# Patient Record
Sex: MALE | Race: WHITE | Employment: OTHER | URBAN - NONMETROPOLITAN AREA
[De-identification: names, ages, dates, MRNs, and addresses within clinical notes are randomized per-mention and may not be internally consistent; named-entity substitution may affect disease eponyms.]

---

## 2020-07-17 ENCOUNTER — APPOINTMENT (OUTPATIENT)
Dept: GENERAL RADIOLOGY | Age: 55
DRG: 240 | End: 2020-07-17
Payer: MEDICARE

## 2020-07-17 ENCOUNTER — HOSPITAL ENCOUNTER (INPATIENT)
Age: 55
LOS: 6 days | Discharge: NURSING, REHAB OR LONG-TERM CARE | DRG: 240 | End: 2020-07-23
Attending: EMERGENCY MEDICINE | Admitting: HOSPITALIST
Payer: MEDICARE

## 2020-07-17 PROBLEM — F31.9 BIPOLAR DISORDER (HCC): Status: ACTIVE | Noted: 2020-07-17

## 2020-07-17 PROBLEM — I73.9 PVD (PERIPHERAL VASCULAR DISEASE) (HCC): Status: ACTIVE | Noted: 2020-07-17

## 2020-07-17 PROBLEM — L97.529 SKIN ULCERS OF FOOT, BILATERAL (HCC): Status: ACTIVE | Noted: 2020-07-17

## 2020-07-17 PROBLEM — F33.9 MAJOR DEPRESSION, RECURRENT, CHRONIC (HCC): Status: ACTIVE | Noted: 2020-07-17

## 2020-07-17 PROBLEM — M86.172 OSTEOMYELITIS OF ANKLE OR FOOT, ACUTE, LEFT (HCC): Status: ACTIVE | Noted: 2020-07-17

## 2020-07-17 PROBLEM — D72.829 LEUKOCYTOSIS: Status: ACTIVE | Noted: 2020-07-17

## 2020-07-17 PROBLEM — Z71.6 TOBACCO ABUSE COUNSELING: Status: ACTIVE | Noted: 2020-07-17

## 2020-07-17 PROBLEM — F99 PSYCHIATRIC DISORDER: Status: ACTIVE | Noted: 2020-07-17

## 2020-07-17 PROBLEM — F17.219 CIGARETTE NICOTINE DEPENDENCE WITH NICOTINE-INDUCED DISORDER: Status: ACTIVE | Noted: 2020-07-17

## 2020-07-17 PROBLEM — L97.519 SKIN ULCERS OF FOOT, BILATERAL (HCC): Status: ACTIVE | Noted: 2020-07-17

## 2020-07-17 LAB
ALBUMIN SERPL-MCNC: 3.4 G/DL (ref 3.5–5.2)
ALP BLD-CCNC: 127 U/L (ref 40–130)
ALT SERPL-CCNC: 14 U/L (ref 5–41)
ANION GAP SERPL CALCULATED.3IONS-SCNC: 12 MMOL/L (ref 7–19)
AST SERPL-CCNC: 12 U/L (ref 5–40)
BASOPHILS ABSOLUTE: 0.1 K/UL (ref 0–0.2)
BASOPHILS RELATIVE PERCENT: 0.8 % (ref 0–1)
BILIRUB SERPL-MCNC: 0.3 MG/DL (ref 0.2–1.2)
BILIRUBIN URINE: ABNORMAL
BLOOD, URINE: NEGATIVE
BUN BLDV-MCNC: 10 MG/DL (ref 6–20)
CALCIUM SERPL-MCNC: 9.7 MG/DL (ref 8.6–10)
CHLORIDE BLD-SCNC: 92 MMOL/L (ref 98–111)
CLARITY: ABNORMAL
CO2: 28 MMOL/L (ref 22–29)
COLOR: ABNORMAL
CREAT SERPL-MCNC: 0.7 MG/DL (ref 0.5–1.2)
EOSINOPHILS ABSOLUTE: 0.3 K/UL (ref 0–0.6)
EOSINOPHILS RELATIVE PERCENT: 2.6 % (ref 0–5)
GFR AFRICAN AMERICAN: >59
GFR NON-AFRICAN AMERICAN: >60
GLUCOSE BLD-MCNC: 263 MG/DL (ref 70–99)
GLUCOSE BLD-MCNC: 269 MG/DL (ref 74–109)
GLUCOSE URINE: >=1000 MG/DL
HCT VFR BLD CALC: 38.5 % (ref 42–52)
HEMOGLOBIN: 13.4 G/DL (ref 14–18)
IMMATURE GRANULOCYTES #: 0.1 K/UL
KETONES, URINE: NEGATIVE MG/DL
LACTIC ACID: 1.2 MMOL/L (ref 0.5–1.9)
LEUKOCYTE ESTERASE, URINE: NEGATIVE
LYMPHOCYTES ABSOLUTE: 3.4 K/UL (ref 1.1–4.5)
LYMPHOCYTES RELATIVE PERCENT: 28.8 % (ref 20–40)
MCH RBC QN AUTO: 31 PG (ref 27–31)
MCHC RBC AUTO-ENTMCNC: 34.8 G/DL (ref 33–37)
MCV RBC AUTO: 89.1 FL (ref 80–94)
MONOCYTES ABSOLUTE: 0.7 K/UL (ref 0–0.9)
MONOCYTES RELATIVE PERCENT: 6 % (ref 0–10)
NEUTROPHILS ABSOLUTE: 7.2 K/UL (ref 1.5–7.5)
NEUTROPHILS RELATIVE PERCENT: 61.3 % (ref 50–65)
NITRITE, URINE: NEGATIVE
PDW BLD-RTO: 12.3 % (ref 11.5–14.5)
PERFORMED ON: ABNORMAL
PH UA: 6 (ref 5–8)
PLATELET # BLD: 406 K/UL (ref 130–400)
PMV BLD AUTO: 8.5 FL (ref 9.4–12.4)
POTASSIUM REFLEX MAGNESIUM: 4.5 MMOL/L (ref 3.5–5)
PROTEIN UA: ABNORMAL MG/DL
RBC # BLD: 4.32 M/UL (ref 4.7–6.1)
SODIUM BLD-SCNC: 132 MMOL/L (ref 136–145)
SPECIFIC GRAVITY UA: 1.03 (ref 1–1.03)
TOTAL PROTEIN: 9.2 G/DL (ref 6.6–8.7)
UROBILINOGEN, URINE: 1 E.U./DL
WBC # BLD: 11.8 K/UL (ref 4.8–10.8)

## 2020-07-17 PROCEDURE — 2580000003 HC RX 258: Performed by: HOSPITALIST

## 2020-07-17 PROCEDURE — 73630 X-RAY EXAM OF FOOT: CPT

## 2020-07-17 PROCEDURE — 85025 COMPLETE CBC W/AUTO DIFF WBC: CPT

## 2020-07-17 PROCEDURE — 6360000002 HC RX W HCPCS: Performed by: EMERGENCY MEDICINE

## 2020-07-17 PROCEDURE — 87040 BLOOD CULTURE FOR BACTERIA: CPT

## 2020-07-17 PROCEDURE — 80053 COMPREHEN METABOLIC PANEL: CPT

## 2020-07-17 PROCEDURE — 99284 EMERGENCY DEPT VISIT MOD MDM: CPT

## 2020-07-17 PROCEDURE — 83605 ASSAY OF LACTIC ACID: CPT

## 2020-07-17 PROCEDURE — 2580000003 HC RX 258: Performed by: EMERGENCY MEDICINE

## 2020-07-17 PROCEDURE — 82947 ASSAY GLUCOSE BLOOD QUANT: CPT

## 2020-07-17 PROCEDURE — 1210000000 HC MED SURG R&B

## 2020-07-17 PROCEDURE — 81003 URINALYSIS AUTO W/O SCOPE: CPT

## 2020-07-17 PROCEDURE — 36415 COLL VENOUS BLD VENIPUNCTURE: CPT

## 2020-07-17 PROCEDURE — 6370000000 HC RX 637 (ALT 250 FOR IP): Performed by: HOSPITALIST

## 2020-07-17 RX ORDER — POLYETHYLENE GLYCOL 3350 17 G/17G
17 POWDER, FOR SOLUTION ORAL DAILY PRN
Status: DISCONTINUED | OUTPATIENT
Start: 2020-07-17 | End: 2020-07-23 | Stop reason: HOSPADM

## 2020-07-17 RX ORDER — OLANZAPINE 10 MG/1
10 TABLET ORAL NIGHTLY
Status: ON HOLD | COMMUNITY
End: 2020-07-20 | Stop reason: DRUGHIGH

## 2020-07-17 RX ORDER — HYDROXYZINE HYDROCHLORIDE 25 MG/1
25 TABLET, FILM COATED ORAL EVERY 6 HOURS PRN
COMMUNITY

## 2020-07-17 RX ORDER — INSULIN GLARGINE 100 [IU]/ML
100 INJECTION, SOLUTION SUBCUTANEOUS NIGHTLY
COMMUNITY

## 2020-07-17 RX ORDER — DEXTROSE MONOHYDRATE 50 MG/ML
100 INJECTION, SOLUTION INTRAVENOUS PRN
Status: DISCONTINUED | OUTPATIENT
Start: 2020-07-17 | End: 2020-07-23 | Stop reason: HOSPADM

## 2020-07-17 RX ORDER — NICOTINE 21 MG/24HR
1 PATCH, TRANSDERMAL 24 HOURS TRANSDERMAL DAILY
Status: DISCONTINUED | OUTPATIENT
Start: 2020-07-18 | End: 2020-07-23 | Stop reason: HOSPADM

## 2020-07-17 RX ORDER — HYDROXYZINE HYDROCHLORIDE 10 MG/1
25 TABLET, FILM COATED ORAL 3 TIMES DAILY PRN
Status: DISCONTINUED | OUTPATIENT
Start: 2020-07-17 | End: 2020-07-23 | Stop reason: HOSPADM

## 2020-07-17 RX ORDER — RISPERIDONE 4 MG/1
4 TABLET, FILM COATED ORAL NIGHTLY
COMMUNITY

## 2020-07-17 RX ORDER — TRAZODONE HYDROCHLORIDE 50 MG/1
50 TABLET ORAL NIGHTLY
Status: DISCONTINUED | OUTPATIENT
Start: 2020-07-17 | End: 2020-07-23 | Stop reason: HOSPADM

## 2020-07-17 RX ORDER — ATORVASTATIN CALCIUM 20 MG/1
10 TABLET, FILM COATED ORAL DAILY
Status: DISCONTINUED | OUTPATIENT
Start: 2020-07-17 | End: 2020-07-23 | Stop reason: HOSPADM

## 2020-07-17 RX ORDER — SODIUM CHLORIDE 0.9 % (FLUSH) 0.9 %
10 SYRINGE (ML) INJECTION EVERY 12 HOURS SCHEDULED
Status: DISCONTINUED | OUTPATIENT
Start: 2020-07-17 | End: 2020-07-23 | Stop reason: HOSPADM

## 2020-07-17 RX ORDER — ACETAMINOPHEN 325 MG/1
650 TABLET ORAL EVERY 6 HOURS PRN
Status: DISCONTINUED | OUTPATIENT
Start: 2020-07-17 | End: 2020-07-23 | Stop reason: HOSPADM

## 2020-07-17 RX ORDER — ONDANSETRON 2 MG/ML
4 INJECTION INTRAMUSCULAR; INTRAVENOUS EVERY 6 HOURS PRN
Status: DISCONTINUED | OUTPATIENT
Start: 2020-07-17 | End: 2020-07-23 | Stop reason: HOSPADM

## 2020-07-17 RX ORDER — OLANZAPINE 10 MG/1
10 TABLET ORAL NIGHTLY
Status: DISCONTINUED | OUTPATIENT
Start: 2020-07-17 | End: 2020-07-23 | Stop reason: HOSPADM

## 2020-07-17 RX ORDER — PROMETHAZINE HYDROCHLORIDE 12.5 MG/1
12.5 TABLET ORAL EVERY 6 HOURS PRN
Status: DISCONTINUED | OUTPATIENT
Start: 2020-07-17 | End: 2020-07-23 | Stop reason: HOSPADM

## 2020-07-17 RX ORDER — SIMVASTATIN 20 MG
20 TABLET ORAL NIGHTLY
COMMUNITY

## 2020-07-17 RX ORDER — MAGNESIUM SULFATE 1 G/100ML
1 INJECTION INTRAVENOUS PRN
Status: DISCONTINUED | OUTPATIENT
Start: 2020-07-17 | End: 2020-07-23 | Stop reason: HOSPADM

## 2020-07-17 RX ORDER — RISPERIDONE 1 MG/1
4 TABLET, FILM COATED ORAL NIGHTLY
Status: DISCONTINUED | OUTPATIENT
Start: 2020-07-17 | End: 2020-07-23 | Stop reason: HOSPADM

## 2020-07-17 RX ORDER — SODIUM CHLORIDE 0.9 % (FLUSH) 0.9 %
10 SYRINGE (ML) INJECTION PRN
Status: DISCONTINUED | OUTPATIENT
Start: 2020-07-17 | End: 2020-07-23 | Stop reason: HOSPADM

## 2020-07-17 RX ORDER — FAMOTIDINE 20 MG/1
20 TABLET, FILM COATED ORAL 2 TIMES DAILY
Status: DISCONTINUED | OUTPATIENT
Start: 2020-07-17 | End: 2020-07-23 | Stop reason: HOSPADM

## 2020-07-17 RX ORDER — DEXTROSE MONOHYDRATE 25 G/50ML
12.5 INJECTION, SOLUTION INTRAVENOUS PRN
Status: DISCONTINUED | OUTPATIENT
Start: 2020-07-17 | End: 2020-07-23 | Stop reason: HOSPADM

## 2020-07-17 RX ORDER — FLUOXETINE HYDROCHLORIDE 20 MG/1
40 CAPSULE ORAL DAILY
Status: DISCONTINUED | OUTPATIENT
Start: 2020-07-17 | End: 2020-07-23 | Stop reason: HOSPADM

## 2020-07-17 RX ORDER — SODIUM CHLORIDE 9 MG/ML
INJECTION, SOLUTION INTRAVENOUS ONCE
Status: COMPLETED | OUTPATIENT
Start: 2020-07-17 | End: 2020-07-17

## 2020-07-17 RX ORDER — ACETAMINOPHEN 650 MG/1
650 SUPPOSITORY RECTAL EVERY 6 HOURS PRN
Status: DISCONTINUED | OUTPATIENT
Start: 2020-07-17 | End: 2020-07-23 | Stop reason: HOSPADM

## 2020-07-17 RX ORDER — POTASSIUM CHLORIDE 7.45 MG/ML
10 INJECTION INTRAVENOUS PRN
Status: DISCONTINUED | OUTPATIENT
Start: 2020-07-17 | End: 2020-07-23 | Stop reason: HOSPADM

## 2020-07-17 RX ORDER — TRAZODONE HYDROCHLORIDE 50 MG/1
50 TABLET ORAL NIGHTLY
COMMUNITY
End: 2020-10-21

## 2020-07-17 RX ORDER — POTASSIUM CHLORIDE 20 MEQ/1
40 TABLET, EXTENDED RELEASE ORAL PRN
Status: DISCONTINUED | OUTPATIENT
Start: 2020-07-17 | End: 2020-07-23 | Stop reason: HOSPADM

## 2020-07-17 RX ORDER — NICOTINE POLACRILEX 4 MG
15 LOZENGE BUCCAL PRN
Status: DISCONTINUED | OUTPATIENT
Start: 2020-07-17 | End: 2020-07-23 | Stop reason: HOSPADM

## 2020-07-17 RX ORDER — FLUOXETINE 10 MG/1
40 CAPSULE ORAL DAILY
Status: ON HOLD | COMMUNITY
End: 2020-07-20

## 2020-07-17 RX ORDER — INSULIN GLARGINE 100 [IU]/ML
15 INJECTION, SOLUTION SUBCUTANEOUS NIGHTLY
Status: DISCONTINUED | OUTPATIENT
Start: 2020-07-17 | End: 2020-07-18

## 2020-07-17 RX ADMIN — CEFEPIME 2 G: 2 INJECTION, POWDER, FOR SOLUTION INTRAMUSCULAR; INTRAVENOUS at 17:27

## 2020-07-17 RX ADMIN — Medication 1000 MG: at 17:07

## 2020-07-17 RX ADMIN — SODIUM CHLORIDE: 9 INJECTION, SOLUTION INTRAVENOUS at 18:13

## 2020-07-17 RX ADMIN — OLANZAPINE 10 MG: 10 TABLET, FILM COATED ORAL at 23:15

## 2020-07-17 RX ADMIN — SODIUM CHLORIDE, PRESERVATIVE FREE 10 ML: 5 INJECTION INTRAVENOUS at 23:19

## 2020-07-17 RX ADMIN — FAMOTIDINE 20 MG: 20 TABLET ORAL at 23:15

## 2020-07-17 RX ADMIN — TRAZODONE HYDROCHLORIDE 50 MG: 50 TABLET ORAL at 23:15

## 2020-07-17 RX ADMIN — INSULIN GLARGINE 15 UNITS: 100 INJECTION, SOLUTION SUBCUTANEOUS at 23:16

## 2020-07-17 RX ADMIN — RISPERIDONE 4 MG: 1 TABLET ORAL at 23:15

## 2020-07-17 RX ADMIN — INSULIN LISPRO 2 UNITS: 100 INJECTION, SOLUTION INTRAVENOUS; SUBCUTANEOUS at 23:16

## 2020-07-17 ASSESSMENT — ENCOUNTER SYMPTOMS
SINUS PRESSURE: 0
APNEA: 0
FACIAL SWELLING: 0
VOICE CHANGE: 0
BLOOD IN STOOL: 0
SHORTNESS OF BREATH: 0
CHOKING: 0
CONSTIPATION: 0
NAUSEA: 0
DIARRHEA: 0
EYE DISCHARGE: 0
SORE THROAT: 0

## 2020-07-17 NOTE — H&P
[place], [place], [address]        DEPARTMENT OF HOSPITALIST MEDICINE        HISTORY & PHYSICAL:          REASON FOR ADMISSION:  Chief Complaint   Patient presents with    Wound Infection     arrived via EMS from [address] with quan foot ulcers        HISTORY OF PRESENT ILLNESS:  Sonny Clement is an 50 y.o. male. He is a very unfortunate male with chronic smoking, PVD and chronic medical issues who is a nursing home resident. He was sent to the ER for evaluation due to bilateral foot ulcers for the last 5 months which are now getting worse. Work-up was done in the ER including x-ray of the left foot which showed findings consistent with osteomyelitis. He also has bilateral PVD with decreased palpations. He was started on IV cefepime and vancomycin and is being admitted for medical management, wound care, vascular surgery and ID evaluation. PAST MEDICAL HISTORY:  Past Medical History:   Diagnosis Date    Anxiety     Diabetes ([facility] [address]     [facility]          PAST SURGICAL HISTORY:  History reviewed. No pertinent surgical history.      SOCIAL HISTORY:  Social History     Socioeconomic History    Marital status: Single     Spouse name: None    Number of children: None    Years of education: None    Highest education level: None   Occupational History    None   Social Needs    Financial resource strain: None    Food insecurity     Worry: None     Inability: None    Transportation needs     Medical: None     Non-medical: None   Tobacco Use    Smoking status: Never Smoker    Smokeless tobacco: Never Used   Substance and Sexual Activity    Alcohol use: Not Currently    Drug use: Never    Sexual activity: None   Lifestyle    Physical activity     Days per week: None     Minutes per session: None    Stress: None   Relationships    Social connections     Talks on phone: None     Gets together: None     Attends Sabianist service: None     Active member of club or organization: None     Attends meetings of clubs or organizations: None     Relationship status: None    Intimate partner violence     Fear of current or ex partner: None     Emotionally abused: None     Physically abused: None     Forced sexual activity: None   Other Topics Concern    None   Social History Narrative    None        FAMILY HISTORY:  History reviewed. No pertinent family history.       ALLERGIES:  Allergies   Allergen Reactions    Clozaril [Clozapine]     Haldol [Haloperidol]     Lithium     Navane [Thiothixene]         PRIOR TO ADMISSION MEDS:  Medications Prior to Admission: insulin glargine (BASAGLAR KWIKPEN) 100 UNIT/ML injection pen, Inject 15 Units into the skin nightly  FLUoxetine (PROZAC) 10 MG capsule, Take 40 mg by mouth daily  hydrOXYzine (ATARAX) 25 MG tablet, Take 25 mg by mouth 3 times daily as needed for Itching  OLANZapine (ZYPREXA) 10 MG tablet, Take 10 mg by mouth nightly  risperiDONE (RISPERDAL) 4 MG tablet, Take 4 mg by mouth nightly  simvastatin (ZOCOR) 20 MG tablet, Take 20 mg by mouth nightly  traZODone (DESYREL) 50 MG tablet, Take 50 mg by mouth nightly     REVIEW OF SYSTEMS:  Constitutional:  No fevers, chills, nausea, vomiting, + tiredness and fatigue   Head:  No head injury, facial trauma   Eyes:  No acute visual changes, exudate, trauma   Ears:  No acute hearing loss, earaches   Nose: No nasal discharge, epistaxis   Neck: No new hoarseness, voice change, or new masses   Lungs:   No hemoptysis, pleurisy   Heart:  No chest pressure with exertion, palpitations,    Abdomen:   No new masses, no bright red blood per rectum   Extremities: + acute pain/difficulty while ambulating due to bilateral foot ulcers, left more than right   Skin: + Bilateral foot ulcerations, large ulceration on the plantar aspect of left foot   Neurologic: No new motor or sensory changes       PHYSICAL EXAM:  /83   Pulse 79   Temp 98 °F (36.7 °C) (Temporal)   Resp 18   Ht 6' 3\" (1.905 m) hours.  ABG:No results for input(s): PHART, WNW1WWB, PO2ART, CBW3SFV, BEART, HGBAE, Q2FCFQQV, CARBOXHGBART in the last 72 hours. EKG:   Please see chart      IMAGING:      Xr Foot Left (min 3 Views)    Result Date: 7/17/2020  1. Findings are highly suggestive of acute osteomyelitis in the 4th and 5th metatarsal heads. Involvement of the base of the 4th proximal phalanx is also suspected. 2. Chronic osteomyelitis of the 1st metatarsal head and great toe and the 3rd metatarsal head. 3. Soft tissue air in the plantar aspect of the 4th MTP joint. Signed by Dr Tess Lloyd on 7/17/2020 2:20 PM    Xr Foot Right (min 3 Views)    Result Date: 7/17/2020  1. No evidence of acute bony abnormality in the right foot. While there is no radiographic evidence of osteomyelitis, the findings of acute osteomyelitis do not appear on radiographs for 10 to 14 days. MRI would be more sensitive and specific for diagnosis of acute osteomyelitis. If the patient is not a candidate for MRI, nuclear medicine three-phase bone scan would be recommended. Signed by Dr Tess Lloyd on 7/17/2020 2:22 PM        Assessment and Plan:    Principal Problem:    Osteomyelitis of ankle or foot, acute, left (HCC)  Active Problems:    Major depression, recurrent, chronic (HCC)    Bipolar disorder ([address]    Psychiatric disorder    Leukocytosis    Skin ulcers of foot, bilateral (HCC)    PVD (peripheral vascular disease) ([address]    Tobacco abuse counseling    Cigarette nicotine dependence with nicotine-induced disorder  Resolved Problems:    * No resolved hospital problems.  *       Admit patient to medical unit under full inpatient status  Patient started on IV vancomycin and cefepime in the ER which we will continue on the floor  Pharmacy consult for vancomycin management  Patient started on diabetic diet  Hemoglobin A1c level ordered  Continue diabetic meds  Accu-Cheks qAC and qHS ordered with low-dose insulin coverage as per protocol  Started pt on IV hydration  ID consult given for evaluation and further treatment recommendations  Vascular surgery consult given for evaluation and further treatment recommendations  Patient might need I&D or possible amputation of the left foot    Repeat labs in a.m. Electrolyte replacement as per protocol. Patient will be monitored very closely on the floor. Further recommendations as per the hospital course. Patient  is on DVT prophylaxis  Current medications reviewed  Lab work reviewed  Radiology/Chest x-ray films reviewed  Discussed with the nurse and addressed all questions/concerns  Discussed with Patient and/or Family at the bedside in detail . .. they verbalize understanding and agree with the management plan. Attestation:  A minimum of two midnights of inpatient hospital care is anticipated to be required based on the patient's clinical condition which requires intensive medical therapy. At this time the patient is not clinically optimized for safe discharge. Kaushik Celis MD  9:22 PM 7/17/2020      DISCLAIMER: This note was created with electronic voice recognition which does have occasional errors. If you have any questions regarding the content within the note please do not hesitate to contact me. .. Thanks.

## 2020-07-17 NOTE — PROGRESS NOTES
Maisha Ahuja arrived to room # 321. Presented with: osteo bilateral feet  Mental Status: Patient is oriented, alert and coherent. Vitals:    07/17/20 1730   BP: 139/83   Pulse: 79   Resp: 18   Temp: 98 °F (36.7 °C)   SpO2: 92%     Patient safety contract and falls prevention contract reviewed with patient Yes. Oriented Patient to room. Call light within reach. Yes.   Needs, issues or concerns expressed at this time: no.      Electronically signed by Italia Rg RN on 7/17/2020 at 5:42 PM

## 2020-07-17 NOTE — PROGRESS NOTES
4 Eyes Skin Assessment    Carlos Marcial is being assessed upon: Admission    I agree that I, Vida Gupta, along with Adrian Crouch (either 2 RN's or 1 LPN and 1 RN) have performed a thorough Head to Toe Skin Assessment on the patient. ALL assessment sites listed below have been assessed. Areas assessed by both nurses:     [x]   Head, Face, and Ears   [x]   Shoulders, Back, and Chest  [x]   Arms, Elbows, and Hands   [x]   Coccyx, Sacrum, and Ischium  [x]   Legs, Feet, and Heels    Does the Patient have Skin Breakdown? Yes, wound(s) noted upon assessment. It is the responsibility of the Primary Nurse to assure that the following documentation, preventions, orders, and consults are complete on the above noted wound(s): Wound LDA initiated. LDA Flowsheet Documentation includes the Corine-wound, Wound Assessment, Measurements, Dressing Treatment, Drainage, and Color.     Jarocho Prevention initiated: Yes  Wound Care Orders initiated: Yes    [address]  nurse consulted for Pressure Injury (Stage 3,4, Unstageable, DTI, NWPT, and Complex wounds) and New or Established Ostomies: Yes        Primary Nurse eSignature: Vida Gupta RN on 7/17/2020 at 5:42 PM      Co-Signer eSignature: Electronically signed by Jerry Cain RN on 7/17/20 at 5:45 PM CDT

## 2020-07-17 NOTE — ED PROVIDER NOTES
[facility] EMERGENCY DEPT  eMERGENCY dEPARTMENT eNCOUnter      Pt Name: Molly Montejo  MRN: [id number]  [place] 1965  Date of evaluation: 7/17/2020  Provider: Jolene Naqvi MD    CHIEF COMPLAINT       Chief Complaint   Patient presents with    Wound Infection     arrived via EMS from [facility] with quan foot ulcers         HISTORY OF PRESENT ILLNESS   (Location/Symptom, Timing/Onset,Context/Setting, Quality, Duration, Modifying Factors, Severity)  Note limiting factors. Molly Montejo is a 50 y.o. male who presents to the emergency department evaluation of wound to foot. 62-year-old male resident of [facility] sent in for deep ulceration of the left foot. He does not give me much other history. Far as I can tell he states he is not running fever or chills. States she sees a podiatrist in [place]. Chart indicates he suffers from schizophrenia anxiety and diabetes. The history is provided by the patient. NursingNotes were reviewed. REVIEW OF SYSTEMS    (2-9 systems for level 4, 10 or more for level 5)     Review of Systems   Constitutional: Negative for chills and fever. HENT: Negative for congestion, drooling, facial swelling, nosebleeds, sinus pressure, sore throat and voice change. Eyes: Negative for discharge. Respiratory: Negative for apnea, choking and shortness of breath. Cardiovascular: Negative for chest pain and leg swelling. Gastrointestinal: Negative for blood in stool, constipation, diarrhea and nausea. Genitourinary: Negative for dysuria and enuresis. Musculoskeletal: Negative for joint swelling. Skin: Positive for wound (Bilateral feet). Negative for rash. Neurological: Negative for seizures and syncope. Psychiatric/Behavioral: Negative for behavioral problems, hallucinations and suicidal ideas. All other systems reviewed and are negative. A complete review of systems was performed and is negative except as noted above in the HPI. PAST MEDICAL HISTORY     Past Medical History:   Diagnosis Date    Anxiety     Diabetes ([facility] [address]     [facility]          SURGICAL HISTORY     History reviewed. No pertinent surgical history. CURRENT MEDICATIONS       Previous Medications    FLUOXETINE (PROZAC) 10 MG CAPSULE    Take 40 mg by mouth daily    HYDROXYZINE (ATARAX) 25 MG TABLET    Take 25 mg by mouth 3 times daily as needed for Itching    INSULIN GLARGINE (BASAGLAR KWIKPEN) 100 UNIT/ML INJECTION PEN    Inject 15 Units into the skin nightly    OLANZAPINE (ZYPREXA) 10 MG TABLET    Take 10 mg by mouth nightly    RISPERIDONE (RISPERDAL) 4 MG TABLET    Take 4 mg by mouth nightly    SIMVASTATIN (ZOCOR) 20 MG TABLET    Take 20 mg by mouth nightly    TRAZODONE (DESYREL) 50 MG TABLET    Take 50 mg by mouth nightly       ALLERGIES     Clozaril [clozapine]; Haldol [haloperidol]; Lithium; and Navane [thiothixene]    FAMILY HISTORY     History reviewed. No pertinent family history.        SOCIAL HISTORY       Social History     Socioeconomic History    Marital status: Single     Spouse name: None    Number of children: None    Years of education: None    Highest education level: None   Occupational History    None   Social Needs    Financial resource strain: None    Food insecurity     Worry: None     Inability: None    Transportation needs     Medical: None     Non-medical: None   Tobacco Use    Smoking status: Never Smoker    Smokeless tobacco: Never Used   Substance and Sexual Activity    Alcohol use: Not Currently    Drug use: Never    Sexual activity: None   Lifestyle    Physical activity     Days per week: None     Minutes per session: None    Stress: None   Relationships    Social connections     Talks on phone: None     Gets together: None     Attends [employer] service: None     Active member of club or organization: None     Attends meetings of clubs or organizations: None     Relationship status: None    Intimate partner violence     Fear of current or ex partner: None     Emotionally abused: None     Physically abused: None     Forced sexual activity: None   Other Topics Concern    None   Social History Narrative    None       SCREENINGS             PHYSICAL EXAM    (up to 7 for level 4, 8 or more for level 5)     ED Triage Vitals [07/17/20 1340]   BP Temp Temp src Pulse Resp SpO2 Height Weight   98/72 98.8 °F (37.1 °C) -- 88 14 96 % -- --       Physical Exam  Vitals signs and nursing note reviewed. Constitutional:       General: He is not in acute distress. Appearance: He is well-developed. HENT:      Head: Normocephalic and atraumatic. Right Ear: External ear normal.      Left Ear: External ear normal.      Nose: Nose normal.   Eyes:      Pupils: Pupils are equal, round, and reactive to light. Neck:      Musculoskeletal: Normal range of motion and neck supple. Cardiovascular:      Rate and Rhythm: Normal rate and regular rhythm. Heart sounds: Normal heart sounds. Pulmonary:      Effort: Pulmonary effort is normal.      Breath sounds: Normal breath sounds. Abdominal:      General: Bowel sounds are normal.      Palpations: Abdomen is soft. Musculoskeletal: Normal range of motion. Feet:    Skin:     General: Skin is warm and dry. Neurological:      General: No focal deficit present. Mental Status: He is alert and oriented to person, place, and time. Psychiatric:         Behavior: Behavior normal.         DIAGNOSTIC RESULTS     EKG: All EKG's are interpreted by the Emergency Department Physician who either signs or Co-signs this chart in the absence of a cardiologist.        RADIOLOGY:   Non-plain film images such as CT, Ultrasound and MRI are read by the radiologist. Plainradiographic images are visualized and preliminarily interpreted by the emergency physician with the below findings:    I have reviewed the results.     Interpretation per the Radiologist below, if available at the time of this note:    XR FOOT LEFT (MIN 3 VIEWS)   Final Result   1. Findings are highly suggestive of acute osteomyelitis in the 4th   and 5th metatarsal heads. Involvement of the base of the 4th proximal   phalanx is also suspected. 2. Chronic osteomyelitis of the 1st metatarsal head and great toe and   the 3rd metatarsal head. 3. Soft tissue air in the plantar aspect of the 4th MTP joint. Signed by Dr Estela Edwards on 7/17/2020 2:20 PM      XR FOOT RIGHT (MIN 3 VIEWS)   Final Result   1. No evidence of acute bony abnormality in the right foot. While there is no radiographic evidence of osteomyelitis, the findings   of acute osteomyelitis do not appear on radiographs for 10 to 14 days. MRI would be more sensitive and specific for diagnosis of acute   osteomyelitis. If the patient is not a candidate for MRI, nuclear   medicine three-phase bone scan would be recommended. Signed by Dr Estela Edwards on 7/17/2020 2:22 PM            ED BEDSIDE ULTRASOUND:   Performed by ED Physician - none    LABS:  Labs Reviewed   CBC WITH AUTO DIFFERENTIAL - Abnormal; Notable for the following components:       Result Value    WBC 11.8 (*)     RBC 4.32 (*)     Hemoglobin 13.4 (*)     Hematocrit 38.5 (*)     Platelets 749 (*)     MPV 8.5 (*)     All other components within normal limits   COMPREHENSIVE METABOLIC PANEL W/ REFLEX TO MG FOR LOW K - Abnormal; Notable for the following components:    Sodium 132 (*)     Chloride 92 (*)     Glucose 269 (*)     Total Protein 9.2 (*)     Alb 3.4 (*)     All other components within normal limits   CULTURE, BLOOD 1   CULTURE, BLOOD 2   LACTIC ACID, PLASMA   URINE RT REFLEX TO CULTURE       All other labs were within normal range or not returned as of this dictation.     EMERGENCY DEPARTMENT COURSE and DIFFERENTIALDIAGNOSIS/MDM:   Vitals:    Vitals:    07/17/20 1340 07/17/20 1400 07/17/20 1600   BP: 98/72 103/68 108/62   Pulse: 88 84 80   Resp: 14 16 17   Temp: 98.8 °F (37.1 °C) SpO2: 96% 96% 97%       MDM      CONSULTS:  PHARMACY TO DOSE VANCOMYCIN  IP CONSULT TO HOSPITALIST    PROCEDURES:  Unless otherwise notedbelow, none     Procedures    FINAL IMPRESSION     1. Other acute osteomyelitis of left foot ([address]    2. Skin ulcer of left foot with necrosis of bone ([address]    3. Skin ulcer of right foot, limited to breakdown of skin ([address]    4.  Type 2 diabetes mellitus with other specified complication, unspecified whether long term insulin use ([address]          DISPOSITION/PLAN   DISPOSITION Decision To Admit 07/17/2020 04:56:52 PM      PATIENT REFERRED TO:  @FUP@    DISCHARGE MEDICATIONS:  New Prescriptions    No medications on file          (Please note that portions of this note were completed with a voice recognition program.  Efforts were made to edit the dictations butoccasionally words are mis-transcribed.)    Stanton Duarte MD (electronically signed)  AttendingEmergency Physician          Jacob Agarwal MD  07/17/20 4911

## 2020-07-18 PROBLEM — F20.9 SCHIZOPHRENIA (HCC): Status: ACTIVE | Noted: 2020-07-18

## 2020-07-18 LAB
ANION GAP SERPL CALCULATED.3IONS-SCNC: 10 MMOL/L (ref 7–19)
BASOPHILS ABSOLUTE: 0.1 K/UL (ref 0–0.2)
BASOPHILS RELATIVE PERCENT: 1 % (ref 0–1)
BUN BLDV-MCNC: 10 MG/DL (ref 6–20)
CALCIUM SERPL-MCNC: 9 MG/DL (ref 8.6–10)
CHLORIDE BLD-SCNC: 96 MMOL/L (ref 98–111)
CO2: 26 MMOL/L (ref 22–29)
CREAT SERPL-MCNC: 0.5 MG/DL (ref 0.5–1.2)
EOSINOPHILS ABSOLUTE: 0.3 K/UL (ref 0–0.6)
EOSINOPHILS RELATIVE PERCENT: 3.1 % (ref 0–5)
GFR AFRICAN AMERICAN: >59
GFR NON-AFRICAN AMERICAN: >60
GLUCOSE BLD-MCNC: 128 MG/DL (ref 70–99)
GLUCOSE BLD-MCNC: 210 MG/DL (ref 74–109)
GLUCOSE BLD-MCNC: 230 MG/DL (ref 70–99)
GLUCOSE BLD-MCNC: 254 MG/DL (ref 70–99)
GLUCOSE BLD-MCNC: 296 MG/DL (ref 70–99)
HBA1C MFR BLD: 11 % (ref 4–6)
HCT VFR BLD CALC: 33.3 % (ref 42–52)
HEMOGLOBIN: 11.2 G/DL (ref 14–18)
IMMATURE GRANULOCYTES #: 0.1 K/UL
LYMPHOCYTES ABSOLUTE: 4.3 K/UL (ref 1.1–4.5)
LYMPHOCYTES RELATIVE PERCENT: 40.8 % (ref 20–40)
MAGNESIUM: 1.8 MG/DL (ref 1.6–2.6)
MCH RBC QN AUTO: 30.2 PG (ref 27–31)
MCHC RBC AUTO-ENTMCNC: 33.6 G/DL (ref 33–37)
MCV RBC AUTO: 89.8 FL (ref 80–94)
MONOCYTES ABSOLUTE: 0.7 K/UL (ref 0–0.9)
MONOCYTES RELATIVE PERCENT: 6.9 % (ref 0–10)
NEUTROPHILS ABSOLUTE: 5 K/UL (ref 1.5–7.5)
NEUTROPHILS RELATIVE PERCENT: 47.6 % (ref 50–65)
PDW BLD-RTO: 12.3 % (ref 11.5–14.5)
PERFORMED ON: ABNORMAL
PHOSPHORUS: 3.3 MG/DL (ref 2.5–4.5)
PLATELET # BLD: 373 K/UL (ref 130–400)
PMV BLD AUTO: 8.8 FL (ref 9.4–12.4)
POTASSIUM SERPL-SCNC: 4.2 MMOL/L (ref 3.5–5)
RBC # BLD: 3.71 M/UL (ref 4.7–6.1)
SODIUM BLD-SCNC: 132 MMOL/L (ref 136–145)
WBC # BLD: 10.5 K/UL (ref 4.8–10.8)

## 2020-07-18 PROCEDURE — 82947 ASSAY GLUCOSE BLOOD QUANT: CPT

## 2020-07-18 PROCEDURE — 99221 1ST HOSP IP/OBS SF/LOW 40: CPT | Performed by: SURGERY

## 2020-07-18 PROCEDURE — 6360000002 HC RX W HCPCS: Performed by: HOSPITALIST

## 2020-07-18 PROCEDURE — 83735 ASSAY OF MAGNESIUM: CPT

## 2020-07-18 PROCEDURE — 1210000000 HC MED SURG R&B

## 2020-07-18 PROCEDURE — 36415 COLL VENOUS BLD VENIPUNCTURE: CPT

## 2020-07-18 PROCEDURE — 84100 ASSAY OF PHOSPHORUS: CPT

## 2020-07-18 PROCEDURE — 93922 UPR/L XTREMITY ART 2 LEVELS: CPT

## 2020-07-18 PROCEDURE — 2580000003 HC RX 258: Performed by: HOSPITALIST

## 2020-07-18 PROCEDURE — 93925 LOWER EXTREMITY STUDY: CPT

## 2020-07-18 PROCEDURE — 80048 BASIC METABOLIC PNL TOTAL CA: CPT

## 2020-07-18 PROCEDURE — 6360000002 HC RX W HCPCS: Performed by: EMERGENCY MEDICINE

## 2020-07-18 PROCEDURE — 85025 COMPLETE CBC W/AUTO DIFF WBC: CPT

## 2020-07-18 PROCEDURE — 83036 HEMOGLOBIN GLYCOSYLATED A1C: CPT

## 2020-07-18 PROCEDURE — 6370000000 HC RX 637 (ALT 250 FOR IP): Performed by: HOSPITALIST

## 2020-07-18 PROCEDURE — 2580000003 HC RX 258: Performed by: EMERGENCY MEDICINE

## 2020-07-18 RX ORDER — INSULIN GLARGINE 100 [IU]/ML
15 INJECTION, SOLUTION SUBCUTANEOUS 2 TIMES DAILY
Status: DISCONTINUED | OUTPATIENT
Start: 2020-07-18 | End: 2020-07-23 | Stop reason: HOSPADM

## 2020-07-18 RX ADMIN — INSULIN LISPRO 3 UNITS: 100 INJECTION, SOLUTION INTRAVENOUS; SUBCUTANEOUS at 17:42

## 2020-07-18 RX ADMIN — FAMOTIDINE 20 MG: 20 TABLET ORAL at 08:49

## 2020-07-18 RX ADMIN — RISPERIDONE 4 MG: 1 TABLET ORAL at 20:28

## 2020-07-18 RX ADMIN — FLUOXETINE HYDROCHLORIDE 40 MG: 20 CAPSULE ORAL at 08:49

## 2020-07-18 RX ADMIN — INSULIN LISPRO 2 UNITS: 100 INJECTION, SOLUTION INTRAVENOUS; SUBCUTANEOUS at 20:27

## 2020-07-18 RX ADMIN — ATORVASTATIN CALCIUM 10 MG: 20 TABLET, FILM COATED ORAL at 08:49

## 2020-07-18 RX ADMIN — VANCOMYCIN HYDROCHLORIDE 1500 MG: 10 INJECTION, POWDER, LYOPHILIZED, FOR SOLUTION INTRAVENOUS at 02:42

## 2020-07-18 RX ADMIN — OLANZAPINE 10 MG: 10 TABLET, FILM COATED ORAL at 20:32

## 2020-07-18 RX ADMIN — SODIUM CHLORIDE, PRESERVATIVE FREE 10 ML: 5 INJECTION INTRAVENOUS at 20:28

## 2020-07-18 RX ADMIN — VANCOMYCIN HYDROCHLORIDE 1500 MG: 10 INJECTION, POWDER, LYOPHILIZED, FOR SOLUTION INTRAVENOUS at 14:08

## 2020-07-18 RX ADMIN — CEFEPIME 2 G: 2 INJECTION, POWDER, FOR SOLUTION INTRAMUSCULAR; INTRAVENOUS at 02:47

## 2020-07-18 RX ADMIN — CEFEPIME 2 G: 2 INJECTION, POWDER, FOR SOLUTION INTRAMUSCULAR; INTRAVENOUS at 16:55

## 2020-07-18 RX ADMIN — HYDROXYZINE HYDROCHLORIDE 25 MG: 10 TABLET, FILM COATED ORAL at 20:28

## 2020-07-18 RX ADMIN — Medication 15 UNITS: at 20:27

## 2020-07-18 RX ADMIN — Medication 15 UNITS: at 11:24

## 2020-07-18 RX ADMIN — INSULIN LISPRO 2 UNITS: 100 INJECTION, SOLUTION INTRAVENOUS; SUBCUTANEOUS at 08:51

## 2020-07-18 RX ADMIN — TRAZODONE HYDROCHLORIDE 50 MG: 50 TABLET ORAL at 20:29

## 2020-07-18 RX ADMIN — SODIUM CHLORIDE, PRESERVATIVE FREE 10 ML: 5 INJECTION INTRAVENOUS at 08:50

## 2020-07-18 RX ADMIN — FAMOTIDINE 20 MG: 20 TABLET ORAL at 20:28

## 2020-07-18 NOTE — PROGRESS NOTES
Pharmacy Note  Vancomycin Consult    Milena Kim is a 50 y.o. male started on Vancomycin for Osteomyelitis; consult [facility] to manage [facility]. Also receiving the following antibiotics: Cefepime. Active Problems:    Osteomyelitis of ankle or foot, acute, left (HCC)  Resolved Problems:    * No resolved hospital problems. *      Allergies:  Clozaril [clozapine]; Haldol [haloperidol]; Lithium; and Navane [thiothixene]     Temp max: 98.8    Recent Labs     07/17/20  1430   BUN 10       Recent Labs     07/17/20  1430   CREATININE 0.7       Recent Labs     07/17/20  1430   WBC 11.8*         Intake/Output Summary (Last 24 hours) at 7/17/2020 1937  Last data filed at 7/17/2020 1915  Gross per 24 hour   Intake 240 ml   Output --   Net 240 ml       Culture Date Source Results   07/17/20 Blood Sent                 Ht Readings from Last 1 Encounters:   07/17/20 6' 3\" (1.905 m)        Wt Readings from Last 1 Encounters:   07/17/20 213 lb 1.6 oz (96.7 kg)         Body mass index is 26.64 kg/m². Estimated Creatinine Clearance: 146 mL/min (based on SCr of 0.7 mg/dL). Assessment/Plan:  Will initiate vancomycin 1500 mg IV every 12 hours. Timing of trough level will be determined based on culture results, renal function, and clinical response. Thank you for the consult. Will continue to follow.     Electronically signed by Amparo Del Valle, [facility] on 7/17/2020 at 7:37 PM

## 2020-07-18 NOTE — PROGRESS NOTES
Date    Anxiety     Diabetes ([facility] [facility]     Schizophrenic disorder ([facility] [address]          PAST SURGICAL HISTORY:  History reviewed. No pertinent surgical history. FAMILY HISTORY:  History reviewed. No pertinent family history. OBJECTIVE:  BP (!) 109/57   Pulse 71   Temp 97.2 °F (36.2 °C) (Temporal)   Resp 12   Ht 6' 3\" (1.905 m)   Wt 216 lb (98 kg)   SpO2 96%   BMI 27.00 kg/m²   No intake/output data recorded.     PHYSICAL  EXAMINATION:    NAZANIN:  Awake, alert, oriented x 3, patient appears tired and fatigued   Head/Eyes:  Normocephalic, atraumatic, EOMI and PERRLA bilaterally   Respiratory:   Bilateral decreased air entry in both lung fields, mild B/L crackles, symmetric expansion of chest   Cardiovascular:  Regular rate and rhythm, S1+S2+0, no murmurs/rubs   Abdomen:   Soft, non-tender, bowel sounds +ve, no organomegaly   Extremities: Moves all, decreased range of motion, bilateral feet ulcerations with surrounding erythema   Neurologic: Awake, alert, oriented x 3, cranial nerves II-XII intact, no focal neurological deficits, sensory system intact   Psychiatric: Normal mood, non-suicidal, flat affect       CURRENT MEDICATIONS:  Scheduled:   insulin glargine  15 Units Subcutaneous BID    cefepime  2 g Intravenous Q12H    FLUoxetine  40 mg Oral Daily    OLANZapine  10 mg Oral Nightly    risperiDONE  4 mg Oral Nightly    atorvastatin  10 mg Oral Daily    traZODone  50 mg Oral Nightly    sodium chloride flush  10 mL Intravenous 2 times per day    famotidine  20 mg Oral BID    vancomycin  1,500 mg Intravenous Q12H    vancomycin (VANCOCIN) intermittent dosing (placeholder)   Other RX Placeholder    insulin lispro  0-6 Units Subcutaneous TID WC    insulin lispro  0-3 Units Subcutaneous Nightly    nicotine  1 patch Transdermal Daily        PRN:  hydrOXYzine, 25 mg, TID PRN  sodium chloride flush, 10 mL, PRN  potassium chloride, 40 mEq, PRN    Or  potassium alternative oral replacement, 40 mEq, PRN Or  potassium chloride, 10 mEq, PRN  magnesium sulfate, 1 g, PRN  acetaminophen, 650 mg, Q6H PRN    Or  acetaminophen, 650 mg, Q6H PRN  polyethylene glycol, 17 g, Daily PRN  promethazine, 12.5 mg, Q6H PRN    Or  ondansetron, 4 mg, Q6H PRN  glucose, 15 g, PRN  dextrose, 12.5 g, PRN  glucagon (rDNA), 1 mg, PRN  dextrose, 100 mL/hr, PRN        Infusions:   dextrose         Laboratory Data:  Recent Labs     07/17/20  1430 07/18/20  0152   WBC 11.8* 10.5   HGB 13.4* 11.2*   * 373     Recent Labs     07/17/20  1430 07/18/20  0152   * 132*   K 4.5 4.2   CL 92* 96*   CO2 28 26   BUN 10 10   CREATININE 0.7 0.5   GLUCOSE 269* 210*     Recent Labs     07/17/20  1430   AST 12   ALT 14   BILITOT 0.3   ALKPHOS 127     Troponin T: No results for input(s): TROPONINI in the last 72 hours. Pro-BNP: No results for input(s): BNP in the last 72 hours. INR: No results for input(s): INR in the last 72 hours. UA:  Recent Labs     07/17/20  1705   COLORU DK YELLOW   PHUR 6.0   CLARITYU CLOUDY*   SPECGRAV 1.031   LEUKOCYTESUR Negative   UROBILINOGEN 1.0   BILIRUBINUR SMALL*   BLOODU Negative   GLUCOSEU >=1000*     A1C:   Recent Labs     07/18/20  0152   LABA1C 11.0*     ABG:No results for input(s): PHART, BPG3KZO, PO2ART, JTK0YBW, BEART, HGBAE, L7MMKNXC, CARBOXHGBART in the last 72 hours. Imaging:          Xr Foot Left (min 3 Views)    Result Date: 7/17/2020  1. Findings are highly suggestive of acute osteomyelitis in the 4th and 5th metatarsal heads. Involvement of the base of the 4th proximal phalanx is also suspected. 2. Chronic osteomyelitis of the 1st metatarsal head and great toe and the 3rd metatarsal head. 3. Soft tissue air in the plantar aspect of the 4th MTP joint. Signed by Dr Tess Lloyd on 7/17/2020 2:20 PM    Xr Foot Right (min 3 Views)    Result Date: 7/17/2020  1. No evidence of acute bony abnormality in the right foot.  While there is no radiographic evidence of osteomyelitis, the findings of acute osteomyelitis do not appear on radiographs for 10 to 14 days. MRI would be more sensitive and specific for diagnosis of acute osteomyelitis. If the patient is not a candidate for MRI, nuclear medicine three-phase bone scan would be recommended. Signed by Dr Remy Mc on 7/17/2020 2:22 PM       ASSESSMENT & PLAN:    Principal Problem:    Osteomyelitis of ankle or foot, acute, left (HCC)  Active Problems:    Major depression, recurrent, chronic (HCC)    Bipolar disorder ([facility] [address] [facility]    Psychiatric disorder    Leukocytosis    Skin ulcers of foot, bilateral (HCC)    PVD (peripheral vascular disease) ([facility] [address]    Tobacco abuse counseling    Cigarette nicotine dependence with nicotine-induced disorder    Schizophrenia ([facility] [address]  Resolved Problems:    * No resolved hospital problems. *    Continue with IV antibiotic in the form of cefepime and vancomycin  Continue with her psychiatric medications  Continue with wound care and dressing changes  Awaiting vascular surgery and ID consultations  Continue with IV hydration  Increase Lantus insulin to 15 units subcu twice daily for better control of diabetes mellitus  Patient is poorly controlled diabetes mellitus as evidenced by hemoglobin A1c of 11%  Awaiting bilateral lower leg arterial duplex results  Continue to keep him n.p.o. Patient might need I&D of the left foot      Repeat labs in a.m. Electrolyte replacement as per protocol. Patient will be monitored very closely on the floor. Further recommendations as per the hospital course. Discharge Plan: Once patient is clinically and symptomatically stable and cleared by specialist on the case      Patient  is on DVT prophylaxis  Current medications reviewed  Lab work reviewed  Radiology/Chest x-ray films reviewed  Treatment recommendations from suspecialities reviewed, appreciated and agreed with  Discussed with the nurse and addressed all questions/concerns  Discussed with Patient and/or Family at the bedside in detail . .. they understand and agree with the management plan. Giovani Chaves MD  7/18/2020 3:49 PM      DISCLAIMER: This note was created with electronic voice recognition which does have occasional errors. If you have any questions regarding the content within the note please do not hesitate to contact me. .. Thanks.

## 2020-07-18 NOTE — CONSULTS
dextrose    Allergies: Allergies   Allergen Reactions    Clozaril [Clozapine]     Haldol [Haloperidol]     Lithium     Navane [Thiothixene]        Past Medical History: Per chart review diabetes. Schizophrenia. Anxiety. Peripheral vascular disease. Tobacco use. Past Surgical History: None listed    Social History: Single. He told me that he smokes 1 pack/day. No reported alcohol use or illicit drug use. From what he describes it sounds like he lives in a group home of some type in [place]. Family History: Unknown. Suspect noncontributory. Exposure History: He is not aware of any close contacts that have been ill. Review of Systems:  No chest pain or chest pressure  No cough or sputum production  No nausea vomiting  No abdominal pain  No urinary symptoms    Vital Signs:  BP (!) 109/57   Pulse 71   Temp 97.2 °F (36.2 °C) (Temporal)   Resp 12   Ht 6' 3\" (1.905 m)   Wt 216 lb (98 kg)   SpO2 96%   BMI 27.00 kg/m²  Temp (24hrs), Av.2 °F (36.2 °C), Min:97 °F (36.1 °C), Max:97.3 °F (36.3 °C)    Physical Exam:   Vital signs reviewed. Lungs clear without crackles  Heart regular rhythm without murmur  Abdomen soft nontender. No hepatosplenomegaly  Trace bilateral lower extremity edema  He has an ulcer plantar aspect right foot medially. He has what appears to be some fairly large blistered or discolored area plantar aspect of the right foot. Findings are consistent with pressure. He has a deeper ulcer left foot which extends to bone. The ulcers on both feet will likely need debridement.     Lab Results:  CBC:   Recent Labs     20  1430 20  0152   WBC 11.8* 10.5   HGB 13.4* 11.2*   HCT 38.5* 33.3*   * 373   LYMPHOPCT 28.8 40.8*   MONOPCT 6.0 6.9     CMP:   Recent Labs     20  1430 20  0152   * 132*   K 4.5 4.2   CL 92* 96*   CO2 28 26   BUN 10 10   CREATININE 0.7 0.5   CALCIUM 9.7 9.0   BILITOT 0.3  --    ALKPHOS 127  --    ALT 14  --    AST 12

## 2020-07-18 NOTE — PLAN OF CARE
Problem:  Activity:  Goal: Risk for activity intolerance will decrease  Description: Risk for activity intolerance will decrease  7/18/2020 1153 by Danielle Ruggiero RN  Outcome: Ongoing  7/18/2020 0906 by Ronny Hashimoto, RN  Outcome: Ongoing  Goal: Amount of time patient spends in regular exercise will increase  Description: Amount of time patient spends in regular exercise will increase  7/18/2020 1153 by Danielle Ruggiero RN  Outcome: Ongoing  7/18/2020 0906 by Ronny Hashimoto, RN  Outcome: Ongoing  Goal: Sleep-wake cycle will improve  Description: Sleep-wake cycle will improve  7/18/2020 1153 by Danielle Ruggiero RN  Outcome: Ongoing  7/18/2020 0906 by Ronny Hashimoto, RN  Outcome: Ongoing     Problem: Coping:  Goal: Ability to adjust to condition or change in health will improve  Description: Ability to adjust to condition or change in health will improve  7/18/2020 1153 by Danielle Ruggiero RN  Outcome: Ongoing  7/18/2020 0906 by Ronny Hashimoto, RN  Outcome: Ongoing     Problem: Fluid Volume:  Goal: Ability to maintain a balanced intake and output will improve  Description: Ability to maintain a balanced intake and output will improve  7/18/2020 1153 by Danielle Ruggiero RN  Outcome: Ongoing  7/18/2020 0906 by Ronny Hashimoto, RN  Outcome: Ongoing     Problem: Health Behavior:  Goal: Ability to identify and utilize available resources and services will improve  Description: Ability to identify and utilize available resources and services will improve  7/18/2020 1153 by Danielle Ruggiero RN  Outcome: Ongoing  7/18/2020 0906 by Ronny Hashimoto, RN  Outcome: Ongoing  Goal: Ability to manage health-related needs will improve  Description: Ability to manage health-related needs will improve  7/18/2020 1153 by Danielle Ruggiero RN  Outcome: Ongoing  7/18/2020 0906 by Ronny Hashimoto, RN  Outcome: Ongoing     Problem: Metabolic:  Goal: Ability to maintain appropriate glucose levels will improve  Description: Ability to maintain appropriate glucose levels will improve  7/18/2020 1153 by Jonah Lombardi RN  Outcome: Ongoing  7/18/2020 0906 by Pat Navarro RN  Outcome: Ongoing     Problem: Nutritional:  Goal: Maintenance of adequate nutrition will improve  Description: Maintenance of adequate nutrition will improve  7/18/2020 1153 by Jonah Lombardi RN  Outcome: Ongoing  7/18/2020 0906 by Pat Navarro RN  Outcome: Ongoing  Goal: Progress toward achieving an optimal weight will improve  Description: Progress toward achieving an optimal weight will improve  7/18/2020 1153 by Jonah Lombardi RN  Outcome: Ongoing  7/18/2020 0906 by Pat Navarro RN  Outcome: Ongoing  Goal: Nutritional status will improve  Description: Nutritional status will improve  7/18/2020 1153 by Jonah Lombardi RN  Outcome: Ongoing  7/18/2020 0906 by Pat Navarro RN  Outcome: Ongoing  Goal: Consumption of the prescribed amount of daily calories will improve  Description: Consumption of the prescribed amount of daily calories will improve  7/18/2020 1153 by Jonah Lombardi RN  Outcome: Ongoing  7/18/2020 0906 by Pat Navarro RN  Outcome: Ongoing  Goal: Ability to make healthy dietary choices will improve  Description: Ability to make healthy dietary choices will improve  7/18/2020 1153 by Jonah Lombardi RN  Outcome: Ongoing  7/18/2020 0906 by Pat Navarro RN  Outcome: Ongoing     Problem: Physical Regulation:  Goal: Complications related to the disease process, condition or treatment will be avoided or minimized  Description: Complications related to the disease process, condition or treatment will be avoided or minimized  7/18/2020 1153 by Jonah Lombardi RN  Outcome: Ongoing  7/18/2020 0906 by Pat Navarro RN  Outcome: Ongoing  Goal: Diagnostic test results will improve  Description: Diagnostic test results will improve  7/18/2020 1153 by Jonah Lombardi RN  Outcome: Ongoing  7/18/2020 0906 by Pat Navarro RN  Outcome: Ongoing  Goal: Will remain free from infection  Description: Will remain free from infection  7/18/2020 1153 by Fidelia Lemons RN  Outcome: Ongoing  7/18/2020 0906 by Crispin Simmons RN  Outcome: Ongoing  Goal: Ability to maintain vital signs within normal range will improve  Description: Ability to maintain vital signs within normal range will improve  7/18/2020 1153 by Fidelia Lemons RN  Outcome: Ongoing  7/18/2020 0906 by Crispin Simmons RN  Outcome: Ongoing     Problem: Skin Integrity:  Goal: Risk for impaired skin integrity will decrease  Description: Risk for impaired skin integrity will decrease  7/18/2020 1153 by Fidelia Lemons RN  Outcome: Ongoing  7/18/2020 0906 by Crispin Simmons RN  Outcome: Ongoing  Goal: Demonstration of wound healing without infection will improve  Description: Demonstration of wound healing without infection will improve  7/18/2020 1153 by Fidelia Lemons RN  Outcome: Ongoing  7/18/2020 0906 by Crispin Simmons RN  Outcome: Ongoing  Goal: Complications related to intravenous access or infusion will be avoided or minimized  Description: Complications related to intravenous access or infusion will be avoided or minimized  7/18/2020 1153 by Fidelia Lemons RN  Outcome: Ongoing  7/18/2020 0906 by Crispin Simmons RN  Outcome: Ongoing  Goal: Will show no infection signs and symptoms  Description: Will show no infection signs and symptoms  Outcome: Ongoing  Goal: Absence of new skin breakdown  Description: Absence of new skin breakdown  Outcome: Ongoing     Problem: Tissue Perfusion:  Goal: Adequacy of tissue perfusion will improve  Description: Adequacy of tissue perfusion will improve  7/18/2020 1153 by Fidelia Lemons RN  Outcome: Ongoing  7/18/2020 0906 by Crispin Simmons RN  Outcome: Ongoing     Problem: Discharge Planning:  Goal: Discharged to appropriate level of care  Description: Discharged to appropriate level of care  7/18/2020 1153 by Fidelia Lemons RN  Outcome: Ongoing  7/18/2020 0906 by Crispin Simmons RN  Outcome: Ongoing     Problem: Serum Glucose Level - Abnormal:  Goal: Ability to maintain appropriate glucose levels will improve  Description: Ability to maintain appropriate glucose levels will improve  7/18/2020 1153 by Marques Deal RN  Outcome: Ongoing  7/18/2020 0906 by Casandra Varela RN  Outcome: Ongoing     Problem: Sensory Perception - Impaired:  Goal: Ability to maintain a stable neurologic state will improve  Description: Ability to maintain a stable neurologic state will improve  7/18/2020 1153 by Marques Deal RN  Outcome: Ongoing  7/18/2020 0906 by Casandra Varela RN  Outcome: Ongoing     Problem: Respiratory:  Goal: Ability to maintain normal respiratory secretions will improve  Description: Ability to maintain normal respiratory secretions will improve  7/18/2020 1153 by Marques Deal RN  Outcome: Ongoing  7/18/2020 0906 by Casandra Varela RN  Outcome: Ongoing     Problem: ABCDS Injury Assessment  Goal: Absence of physical injury  7/18/2020 1153 by Marques Deal RN  Outcome: Ongoing  7/18/2020 0906 by Casandra Varela RN  Outcome: Ongoing     Problem: Falls - Risk of:  Goal: Will remain free from falls  Description: Will remain free from falls  7/18/2020 1153 by Marques Deal RN  Outcome: Ongoing  7/18/2020 0906 by Casandra Varela RN  Outcome: Ongoing  Goal: Absence of physical injury  Description: Absence of physical injury  7/18/2020 1153 by Marques Deal RN  Outcome: Ongoing  7/18/2020 0906 by Casandra Varela RN  Outcome: Ongoing     Problem: Safety:  Goal: Free from accidental physical injury  Description: Free from accidental physical injury  7/18/2020 1153 by Marques Deal RN  Outcome: Ongoing  7/18/2020 0906 by Casandra Varela RN  Outcome: Ongoing  Goal: Free from intentional harm  Description: Free from intentional harm  7/18/2020 1153 by Marques Deal RN  Outcome: Ongoing  7/18/2020 0906 by Casandra Varela RN  Outcome: Ongoing     Problem: Daily Care:  Goal: Daily care needs are met  Description: Daily care needs are met  7/18/2020 1153 by Marques Deal RN  Outcome: Ongoing  7/18/2020 0906 by Louis Xie RN  Outcome: Ongoing     Problem: Pain:  Goal: Patient's pain/discomfort is manageable  Description: Patient's pain/discomfort is manageable  7/18/2020 1153 by Connie Vasquez RN  Outcome: Ongoing  7/18/2020 0906 by Louis Xie RN  Outcome: Ongoing     Problem: Skin Integrity:  Goal: Skin integrity will stabilize  Description: Skin integrity will stabilize  7/18/2020 1153 by Connie Vasquez RN  Outcome: Ongoing  7/18/2020 0906 by Louis Xie RN  Outcome: Ongoing     Problem: Discharge Planning:  Goal: Patients continuum of care needs are met  Description: Patients continuum of care needs are met  7/18/2020 1153 by Connie Vasquez RN  Outcome: Ongoing  7/18/2020 0906 by Louis Xie RN  Outcome: Ongoing

## 2020-07-18 NOTE — PLAN OF CARE
Problem:  Activity:  Goal: Risk for activity intolerance will decrease  Description: Risk for activity intolerance will decrease  Outcome: Ongoing  Goal: Amount of time patient spends in regular exercise will increase  Description: Amount of time patient spends in regular exercise will increase  Outcome: Ongoing  Goal: Sleep-wake cycle will improve  Description: Sleep-wake cycle will improve  Outcome: Ongoing     Problem: Coping:  Goal: Ability to adjust to condition or change in health will improve  Description: Ability to adjust to condition or change in health will improve  Outcome: Ongoing     Problem: Fluid Volume:  Goal: Ability to maintain a balanced intake and output will improve  Description: Ability to maintain a balanced intake and output will improve  Outcome: Ongoing     Problem: Health Behavior:  Goal: Ability to identify and utilize available resources and services will improve  Description: Ability to identify and utilize available resources and services will improve  Outcome: Ongoing  Goal: Ability to manage health-related needs will improve  Description: Ability to manage health-related needs will improve  Outcome: Ongoing     Problem: Metabolic:  Goal: Ability to maintain appropriate glucose levels will improve  Description: Ability to maintain appropriate glucose levels will improve  Outcome: Ongoing     Problem: Nutritional:  Goal: Maintenance of adequate nutrition will improve  Description: Maintenance of adequate nutrition will improve  Outcome: Ongoing  Goal: Progress toward achieving an optimal weight will improve  Description: Progress toward achieving an optimal weight will improve  Outcome: Ongoing  Goal: Nutritional status will improve  Description: Nutritional status will improve  Outcome: Ongoing  Goal: Consumption of the prescribed amount of daily calories will improve  Description: Consumption of the prescribed amount of daily calories will improve  Outcome: Ongoing  Goal: Ability to make healthy dietary choices will improve  Description: Ability to make healthy dietary choices will improve  Outcome: Ongoing     Problem: Physical Regulation:  Goal: Complications related to the disease process, condition or treatment will be avoided or minimized  Description: Complications related to the disease process, condition or treatment will be avoided or minimized  Outcome: Ongoing  Goal: Diagnostic test results will improve  Description: Diagnostic test results will improve  Outcome: Ongoing  Goal: Will remain free from infection  Description: Will remain free from infection  Outcome: Ongoing  Goal: Ability to maintain vital signs within normal range will improve  Description: Ability to maintain vital signs within normal range will improve  Outcome: Ongoing     Problem: Skin Integrity:  Goal: Risk for impaired skin integrity will decrease  Description: Risk for impaired skin integrity will decrease  Outcome: Ongoing  Goal: Demonstration of wound healing without infection will improve  Description: Demonstration of wound healing without infection will improve  Outcome: Ongoing  Goal: Complications related to intravenous access or infusion will be avoided or minimized  Description: Complications related to intravenous access or infusion will be avoided or minimized  Outcome: Ongoing     Problem: Tissue Perfusion:  Goal: Adequacy of tissue perfusion will improve  Description: Adequacy of tissue perfusion will improve  Outcome: Ongoing     Problem: Discharge Planning:  Goal: Discharged to appropriate level of care  Description: Discharged to appropriate level of care  Outcome: Ongoing     Problem: Serum Glucose Level - Abnormal:  Goal: Ability to maintain appropriate glucose levels will improve  Description: Ability to maintain appropriate glucose levels will improve  Outcome: Ongoing     Problem: Sensory Perception - Impaired:  Goal: Ability to maintain a stable neurologic state will improve  Description: Ability to maintain a stable neurologic state will improve  Outcome: Ongoing     Problem: Respiratory:  Goal: Ability to maintain normal respiratory secretions will improve  Description: Ability to maintain normal respiratory secretions will improve  Outcome: Ongoing     Problem: ABCDS Injury Assessment  Goal: Absence of physical injury  Outcome: Ongoing     Problem: Falls - Risk of:  Goal: Will remain free from falls  Description: Will remain free from falls  Outcome: Ongoing  Goal: Absence of physical injury  Description: Absence of physical injury  Outcome: Ongoing     Problem: Safety:  Goal: Free from accidental physical injury  Description: Free from accidental physical injury  Outcome: Ongoing  Goal: Free from intentional harm  Description: Free from intentional harm  Outcome: Ongoing     Problem: Daily Care:  Goal: Daily care needs are met  Description: Daily care needs are met  Outcome: Ongoing     Problem: Pain:  Goal: Patient's pain/discomfort is manageable  Description: Patient's pain/discomfort is manageable  Outcome: Ongoing     Problem: Skin Integrity:  Goal: Skin integrity will stabilize  Description: Skin integrity will stabilize  Outcome: Ongoing     Problem: Discharge Planning:  Goal: Patients continuum of care needs are met  Description: Patients continuum of care needs are met  Outcome: Ongoing

## 2020-07-18 NOTE — PROGRESS NOTES
Vascular lab preliminary results. Bilateral lower extremity arterial duplex and ALEXANDER performed. Right:  PT 1.18  DP 1.13  Digit 0.99    Left:  PT 1.35   DP 1.15  Digit 0.66    Final report pending.

## 2020-07-19 LAB
ANION GAP SERPL CALCULATED.3IONS-SCNC: 11 MMOL/L (ref 7–19)
BUN BLDV-MCNC: 8 MG/DL (ref 6–20)
CALCIUM SERPL-MCNC: 9 MG/DL (ref 8.6–10)
CHLORIDE BLD-SCNC: 96 MMOL/L (ref 98–111)
CO2: 26 MMOL/L (ref 22–29)
CREAT SERPL-MCNC: 0.6 MG/DL (ref 0.5–1.2)
GFR AFRICAN AMERICAN: >59
GFR NON-AFRICAN AMERICAN: >60
GLUCOSE BLD-MCNC: 171 MG/DL (ref 74–109)
GLUCOSE BLD-MCNC: 227 MG/DL (ref 70–99)
GLUCOSE BLD-MCNC: 334 MG/DL (ref 70–99)
HCT VFR BLD CALC: 33 % (ref 42–52)
HEMOGLOBIN: 11.2 G/DL (ref 14–18)
MCH RBC QN AUTO: 30.5 PG (ref 27–31)
MCHC RBC AUTO-ENTMCNC: 33.9 G/DL (ref 33–37)
MCV RBC AUTO: 89.9 FL (ref 80–94)
PDW BLD-RTO: 12.4 % (ref 11.5–14.5)
PERFORMED ON: ABNORMAL
PERFORMED ON: ABNORMAL
PLATELET # BLD: 356 K/UL (ref 130–400)
PMV BLD AUTO: 8.5 FL (ref 9.4–12.4)
POTASSIUM SERPL-SCNC: 4.1 MMOL/L (ref 3.5–5)
RBC # BLD: 3.67 M/UL (ref 4.7–6.1)
SODIUM BLD-SCNC: 133 MMOL/L (ref 136–145)
VANCOMYCIN TROUGH: 10 UG/ML (ref 10–20)
WBC # BLD: 10 K/UL (ref 4.8–10.8)

## 2020-07-19 PROCEDURE — 6360000002 HC RX W HCPCS: Performed by: HOSPITALIST

## 2020-07-19 PROCEDURE — 6370000000 HC RX 637 (ALT 250 FOR IP): Performed by: HOSPITALIST

## 2020-07-19 PROCEDURE — 85027 COMPLETE CBC AUTOMATED: CPT

## 2020-07-19 PROCEDURE — 99233 SBSQ HOSP IP/OBS HIGH 50: CPT | Performed by: SURGERY

## 2020-07-19 PROCEDURE — 80202 ASSAY OF VANCOMYCIN: CPT

## 2020-07-19 PROCEDURE — 2580000003 HC RX 258: Performed by: HOSPITALIST

## 2020-07-19 PROCEDURE — 80048 BASIC METABOLIC PNL TOTAL CA: CPT

## 2020-07-19 PROCEDURE — 6360000002 HC RX W HCPCS: Performed by: EMERGENCY MEDICINE

## 2020-07-19 PROCEDURE — 1210000000 HC MED SURG R&B

## 2020-07-19 PROCEDURE — 2580000003 HC RX 258: Performed by: EMERGENCY MEDICINE

## 2020-07-19 PROCEDURE — 82947 ASSAY GLUCOSE BLOOD QUANT: CPT

## 2020-07-19 PROCEDURE — 36415 COLL VENOUS BLD VENIPUNCTURE: CPT

## 2020-07-19 RX ADMIN — Medication 15 UNITS: at 22:40

## 2020-07-19 RX ADMIN — TRAZODONE HYDROCHLORIDE 50 MG: 50 TABLET ORAL at 22:40

## 2020-07-19 RX ADMIN — FLUOXETINE HYDROCHLORIDE 40 MG: 20 CAPSULE ORAL at 09:34

## 2020-07-19 RX ADMIN — INSULIN LISPRO 2 UNITS: 100 INJECTION, SOLUTION INTRAVENOUS; SUBCUTANEOUS at 19:09

## 2020-07-19 RX ADMIN — OLANZAPINE 10 MG: 10 TABLET, FILM COATED ORAL at 22:40

## 2020-07-19 RX ADMIN — INSULIN LISPRO 2 UNITS: 100 INJECTION, SOLUTION INTRAVENOUS; SUBCUTANEOUS at 14:30

## 2020-07-19 RX ADMIN — INSULIN LISPRO 2 UNITS: 100 INJECTION, SOLUTION INTRAVENOUS; SUBCUTANEOUS at 22:41

## 2020-07-19 RX ADMIN — VANCOMYCIN HYDROCHLORIDE 1500 MG: 10 INJECTION, POWDER, LYOPHILIZED, FOR SOLUTION INTRAVENOUS at 14:34

## 2020-07-19 RX ADMIN — FAMOTIDINE 20 MG: 20 TABLET ORAL at 22:40

## 2020-07-19 RX ADMIN — INSULIN LISPRO 2 UNITS: 100 INJECTION, SOLUTION INTRAVENOUS; SUBCUTANEOUS at 09:51

## 2020-07-19 RX ADMIN — FAMOTIDINE 20 MG: 20 TABLET ORAL at 09:34

## 2020-07-19 RX ADMIN — CEFEPIME 2 G: 2 INJECTION, POWDER, FOR SOLUTION INTRAMUSCULAR; INTRAVENOUS at 03:16

## 2020-07-19 RX ADMIN — VANCOMYCIN HYDROCHLORIDE 1500 MG: 10 INJECTION, POWDER, LYOPHILIZED, FOR SOLUTION INTRAVENOUS at 03:16

## 2020-07-19 RX ADMIN — Medication 15 UNITS: at 09:50

## 2020-07-19 RX ADMIN — CEFEPIME 2 G: 2 INJECTION, POWDER, FOR SOLUTION INTRAMUSCULAR; INTRAVENOUS at 19:02

## 2020-07-19 RX ADMIN — RISPERIDONE 4 MG: 1 TABLET ORAL at 22:40

## 2020-07-19 RX ADMIN — ATORVASTATIN CALCIUM 10 MG: 20 TABLET, FILM COATED ORAL at 09:33

## 2020-07-19 NOTE — PROGRESS NOTES
Alana Kulkarni [phone] (pt's mom).  She would like to be kept informed with pt's procedure as she cannot be at the hospital.

## 2020-07-19 NOTE — PROGRESS NOTES
[place], [place], [address]        DEPARTMENT OF HOSPITALIST MEDICINE        PROGRESS  NOTE:    NOTE: Portions of this note have been copied forward, however, changed to reflect the most current clinical status of this patient. Patient:  Jermain Dumnot  YOB: 1965  Date of Service: 7/19/2020  MRN: [id number]   Acct: [de-identified]   Primary Care Physician: No primary care provider on file. Advance Directive: Full Code  Admit Date: 7/17/2020       Hospital Day: 2      CHIEF COMPLAINT:  Chief Complaint   Patient presents with    Wound Infection     arrived via EMS from [address] with quan foot ulcers       SUBJECTIVE / [address]:  7/19/2020  Patient still on IV antibiotics and followed up by ID and vascular surgery. Vascular surgery plans on taking him to the OR for amputation of his left fourth, fifth and possibly third toes. 7/18/2020  Patient is receiving IV antibiotics. Blood sugar levels are elevated in 200s. I increase Lantus insulin to 15 units subcu twice daily. His hemoglobin A1c is 11%. We are awaiting consults by ID and vascular surgery. 7/17/2020  HISTORY OF PRESENT ILLNESS:  Jermain Dumont is an 50 y.o. male. He is a very unfortunate male with chronic smoking, PVD and chronic medical issues who is a nursing home resident. He was sent to the ER for evaluation due to bilateral foot ulcers for the last 5 months which are now getting worse. Work-up was done in the ER including x-ray of the left foot which showed findings consistent with osteomyelitis. He also has bilateral PVD with decreased palpations. He was started on IV cefepime and vancomycin and is being admitted for medical management, wound care, vascular surgery and ID evaluation.       REVIEW  OF  SYSTEMS:    Constitutional:  No fevers, chills, nausea, vomiting,    Lungs:   No hemoptysis, pleurisy   Heart:  No chest pressure with exertion, palpitations,    Abdomen:   No new masses, no bright red blood per rectum   Extremities: ++pain while ambulating due to bilateral foot ulcers   Neurologic:  Bilateral mild numbness of feet       PAST MEDICAL HISTORY:  Past Medical History:   Diagnosis Date    Anxiety     Diabetes ([facility]     Schizophrenic disorder ([facility]          PAST SURGICAL HISTORY:  History reviewed. No pertinent surgical history. FAMILY HISTORY:  History reviewed. No pertinent family history. OBJECTIVE:  /73   Pulse 51   Temp 97 °F (36.1 °C) (Temporal)   Resp 16   Ht 6' 3\" (1.905 m)   Wt 216 lb (98 kg)   SpO2 96%   BMI 27.00 kg/m²   No intake/output data recorded.     PHYSICAL  EXAMINATION:    NAZANIN:  Awake, alert, oriented x 3, patient appears tired and fatigued   Head/Eyes:  Normocephalic, atraumatic, EOMI and PERRLA bilaterally   Respiratory:   Bilateral decreased air entry in both lung fields, mild B/L crackles, symmetric expansion of chest   Cardiovascular:  Regular rate and rhythm, S1+S2+0, no murmurs/rubs   Abdomen:   Soft, non-tender, bowel sounds +ve, no organomegaly   Extremities: Moves all, decreased range of motion, bilateral feet ulcerations with surrounding erythema   Neurologic: Awake, alert, oriented x 3, cranial nerves II-XII intact, no focal neurological deficits, sensory system intact   Psychiatric: Normal mood, non-suicidal, flat affect       CURRENT MEDICATIONS:  Scheduled:   [START ON 7/20/2020] vancomycin  1,750 mg Intravenous Q12H    insulin glargine  15 Units Subcutaneous BID    cefepime  2 g Intravenous Q12H    FLUoxetine  40 mg Oral Daily    OLANZapine  10 mg Oral Nightly    risperiDONE  4 mg Oral Nightly    atorvastatin  10 mg Oral Daily    traZODone  50 mg Oral Nightly    sodium chloride flush  10 mL Intravenous 2 times per day    famotidine  20 mg Oral BID    vancomycin (VANCOCIN) intermittent dosing (placeholder)   Other RX Placeholder    insulin lispro  0-6 Units Subcutaneous TID WC    insulin lispro  0-3 Units Subcutaneous in the plantar aspect of the 4th MTP joint. Signed by Dr Zachary Eastman on 7/17/2020 2:20 PM    Xr Foot Right (min 3 Views)    Result Date: 7/17/2020  1. No evidence of acute bony abnormality in the right foot. While there is no radiographic evidence of osteomyelitis, the findings of acute osteomyelitis do not appear on radiographs for 10 to 14 days. MRI would be more sensitive and specific for diagnosis of acute osteomyelitis. If the patient is not a candidate for MRI, nuclear medicine three-phase bone scan would be recommended. Signed by Dr Zachary Eastman on 7/17/2020 2:22 PM       ASSESSMENT & PLAN:    Principal Problem:    Osteomyelitis of ankle or foot, acute, left (HCC)  Active Problems:    Major depression, recurrent, chronic (HCC)    Bipolar disorder ([facility] [facility]    Psychiatric disorder    Leukocytosis    Skin ulcers of foot, bilateral (HCC)    PVD (peripheral vascular disease) ([facility] [address]    Tobacco abuse counseling    Cigarette nicotine dependence with nicotine-induced disorder    Schizophrenia ([facility] [address]  Resolved Problems:    * No resolved hospital problems. *    Continue with IV antibiotic in the form of cefepime and vancomycin  Continue with her psychiatric medications  Continue with wound care and dressing changes  Vascular surgery and ID consultations reviewed and appreciated  Continue with IV hydration  Increased Lantus insulin to 15 units subcu twice daily for better control of diabetes mellitus  Patient is poorly controlled diabetes mellitus as evidenced by hemoglobin A1c of 11%  Patient has mildly diminished blood flow to bilateral arterial vasculature in both legs on lower leg arterial duplex results  Keep patient n.p.o. after midnight for surgery in a.m. Repeat labs in a.m. Electrolyte replacement as per protocol. Patient will be monitored very closely on the floor. Further recommendations as per the hospital course.         Discharge Plan: Once patient is clinically and symptomatically stable and cleared by specialists on the case      Patient  is on DVT prophylaxis  Current medications reviewed  Lab work reviewed  Radiology/Chest x-ray films reviewed  Treatment recommendations from suspecialities reviewed, appreciated and agreed with  Discussed with the nurse and addressed all questions/concerns  Discussed with Patient and/or Family at the bedside in detail . .. they understand and agree with the management plan. Giovani Chaves MD  7/19/2020 4:16 PM      DISCLAIMER: This note was created with electronic voice recognition which does have occasional errors. If you have any questions regarding the content within the note please do not hesitate to contact me. .. Thanks.

## 2020-07-19 NOTE — PROGRESS NOTES
Pt's L foot dressing changed. Dr. Dustin Mccarty at bedside. He explained to the pt that he will need toe amputations tomorrow. He verbalized understanding. I did continue to talk about the procedure with the pt and he, again, stated that he understood and gave me permission to speak with his mom.

## 2020-07-19 NOTE — CONSULTS
Vascular Surgery Consultation    History of Present Illness    I was consulted to see the patient for peripheral vascular occlusive disease with ulcers bilateral feet. He has not been diagnosed in the past with peripheral vascular occlusive disease. He does not complain of claudication symptoms. He does not complain of rest pain symptoms.     History    Licha Farooq is a 50 y.o. male with the following history reviewed and recorded in Health system:  Patient Active Problem List    Diagnosis Date Noted    Schizophrenia ([facility] 07/18/2020    Osteomyelitis of ankle or foot, acute, left ([facility] 07/17/2020    Major depression, recurrent, chronic ([facility] 07/17/2020    Bipolar disorder ([facility] 07/17/2020    Psychiatric disorder 07/17/2020    Leukocytosis 07/17/2020    Skin ulcers of foot, bilateral ([facility] 07/17/2020    PVD (peripheral vascular disease) ([facility] 07/17/2020    Tobacco abuse counseling 07/17/2020    Cigarette nicotine dependence with nicotine-induced disorder 07/17/2020     Current Facility-Administered Medications   Medication Dose Route Frequency Provider Last Rate Last Dose    insulin glargine (LANTUS) injection vial 15 Units  15 Units Subcutaneous BID Giovani Chaves MD   15 Units at 07/19/20 0950    ceFEPIme (MAXIPIME) 2 g in sterile water 20 mL IV syringe  2 g Intravenous Q12H Giovani Chaves MD   2 g at 07/19/20 0316    FLUoxetine (PROZAC) capsule 40 mg  40 mg Oral Daily Giovani Chaves MD   40 mg at 07/19/20 0934    hydrOXYzine (ATARAX) tablet 25 mg  25 mg Oral TID PRN Giovani Chaves MD   25 mg at 07/18/20 2028    OLANZapine (ZYPREXA) tablet 10 mg  10 mg Oral Nightly Giovani Chaves MD   10 mg at 07/18/20 2032    risperiDONE (RISPERDAL) tablet 4 mg  4 mg Oral Nightly Giovani Chaves MD   4 mg at 07/18/20 2028    atorvastatin (LIPITOR) tablet 10 mg  10 mg Oral Daily Giovani Chaves MD   10 mg at 07/19/20 0933    traZODone (DESYREL) tablet 50 mg  50 mg Oral Nightly Giovani Chaves MD   50 mg at 07/18/20 2029    sodium chloride flush 0.9 % injection 10 mL  10 mL Intravenous 2 times per day Giovani Chaves MD   10 mL at 07/18/20 2028    sodium chloride flush 0.9 % injection 10 mL  10 mL Intravenous PRN Giovani Chaves MD        potassium chloride (KLOR-CON M) extended release tablet 40 mEq  40 mEq Oral PRN Giovani Chaves MD        Or    potassium bicarb-citric acid (EFFER-K) effervescent tablet 40 mEq  40 mEq Oral PRN Giovani Chaves MD        Or    potassium chloride 10 mEq/100 mL IVPB (Peripheral Line)  10 mEq Intravenous PRN Giovani Chaves MD        magnesium sulfate 1 g in dextrose 5% 100 mL IVPB  1 g Intravenous PRN Giovani Chaves MD        acetaminophen (TYLENOL) tablet 650 mg  650 mg Oral Q6H PRN Giovani Chaves MD        Or    acetaminophen (TYLENOL) suppository 650 mg  650 mg Rectal Q6H PRN Giovani Chaves MD        polyethylene glycol (GLYCOLAX) packet 17 g  17 g Oral Daily PRN Giovani Chaves MD        promethazine (PHENERGAN) tablet 12.5 mg  12.5 mg Oral Q6H PRN Giovani Chaves MD        Or    ondansetron (ZOFRAN) injection 4 mg  4 mg Intravenous Q6H PRN Giovani Chaves MD        famotidine (PEPCID) tablet 20 mg  20 mg Oral BID Giovani Chaves MD   20 mg at 07/19/20 0934    vancomycin (VANCOCIN) 1,500 mg in dextrose 5 % 500 mL IVPB  1,500 mg Intravenous Q12H Erasmo Davis MD   Stopped at 07/19/20 0516    vancomycin (VANCOCIN) intermittent dosing (placeholder)   Other RX Carmelita Melgar MD        insulin lispro (HUMALOG) injection vial 0-6 Units  0-6 Units Subcutaneous TID WC Giovani Chaves MD   2 Units at 07/19/20 0951    insulin lispro (HUMALOG) injection vial 0-3 Units  0-3 Units Subcutaneous Nightly Giovani Chaves MD   2 Units at 07/18/20 2027    nicotine (NICODERM CQ) 21 MG/24HR 1 patch  1 patch Transdermal Daily Giovani Chaves MD   1 patch at 07/19/20 0933    glucose (GLUTOSE) 40 % oral gel 15 g  15 g Oral PRN Giovani hCaves MD        dextrose 50 % IV solution  12.5 g Intravenous PRN Faheem Duron MD       [facility] Constitutional - well developed, well nourished. No diaphoresis or acute distress. HENT - head normocephalic, conjunctiva normal.  EOMS normal.  No exudate. No icterus. Neck- ROM appears normal, no tracheal deviation, Carotid pulses are 2+ to palpation bilaterally without bruit. Cardiovascular - Regular rate and rhythm. Extremities - Radial and brachial pulses are 2+ to palpation bilaterally. Right femoral pulse: present 2+;  Right DP: present 1+; Right PT present 1+; Left femoral pulse: present 2+;  Left DP: present 1+; Left PT: present 1+  No cyanosis, clubbing   Pulmonary - effort appears normal.  No respiratory distress. Lungs - Breath sounds normal. No wheezes or rales. GI - Abdomen - soft, non tender, bowel sounds X 4 quadrants. No guarding or rebound tenderness. No distension or palpable mass. Genitourinary - deferred  Musculoskeletal - ROM appears normal.    Neurologic - alert and oriented X 3. Physiologic. Skin - see media. Deep left plantar/lateral foot ulcer, necrotic deep tissue very close to bone, no crepitance in foot, 3/4 and 4/5 web space ulcers clean, cellulitis left foot and lower leg, ulcer right plantar foot clean  Psychiatric - mood, affect, and behavior appear normal.  Judgment and thought processes appear normal.          Assessment    1. Other acute osteomyelitis of left foot ([address]    2. Skin ulcer of left foot with necrosis of bone ([address]    3. Skin ulcer of right foot, limited to breakdown of skin ([address]    4. Type 2 diabetes mellitus with other specified complication, unspecified whether long term insulin use ([address]    5.  PVD (peripheral vascular disease) ([address]        Plan    IV abx  Wound care  Offloading  Will get non invasive vasc lab of bilateral lower extremities to determine if he needs revascularization  Will likely need surgical resection left lateral foot

## 2020-07-19 NOTE — PLAN OF CARE
Problem:  Activity:  Goal: Risk for activity intolerance will decrease  Description: Risk for activity intolerance will decrease  Outcome: Ongoing  Goal: Amount of time patient spends in regular exercise will increase  Description: Amount of time patient spends in regular exercise will increase  Outcome: Ongoing  Goal: Sleep-wake cycle will improve  Description: Sleep-wake cycle will improve  Outcome: Ongoing     Problem: Coping:  Goal: Ability to adjust to condition or change in health will improve  Description: Ability to adjust to condition or change in health will improve  Outcome: Ongoing     Problem: Fluid Volume:  Goal: Ability to maintain a balanced intake and output will improve  Description: Ability to maintain a balanced intake and output will improve  Outcome: Ongoing     Problem: Health Behavior:  Goal: Ability to identify and utilize available resources and services will improve  Description: Ability to identify and utilize available resources and services will improve  Outcome: Ongoing  Goal: Ability to manage health-related needs will improve  Description: Ability to manage health-related needs will improve  Outcome: Ongoing     Problem: Metabolic:  Goal: Ability to maintain appropriate glucose levels will improve  Description: Ability to maintain appropriate glucose levels will improve  Outcome: Ongoing     Problem: Nutritional:  Goal: Maintenance of adequate nutrition will improve  Description: Maintenance of adequate nutrition will improve  Outcome: Ongoing  Goal: Progress toward achieving an optimal weight will improve  Description: Progress toward achieving an optimal weight will improve  Outcome: Ongoing  Goal: Nutritional status will improve  Description: Nutritional status will improve  Outcome: Ongoing  Goal: Consumption of the prescribed amount of daily calories will improve  Description: Consumption of the prescribed amount of daily calories will improve  Outcome: Ongoing  Goal: Ability to make healthy dietary choices will improve  Description: Ability to make healthy dietary choices will improve  Outcome: Ongoing     Problem: Physical Regulation:  Goal: Complications related to the disease process, condition or treatment will be avoided or minimized  Description: Complications related to the disease process, condition or treatment will be avoided or minimized  Outcome: Ongoing  Goal: Diagnostic test results will improve  Description: Diagnostic test results will improve  Outcome: Ongoing  Goal: Will remain free from infection  Description: Will remain free from infection  Outcome: Ongoing  Goal: Ability to maintain vital signs within normal range will improve  Description: Ability to maintain vital signs within normal range will improve  Outcome: Ongoing     Problem: Skin Integrity:  Goal: Risk for impaired skin integrity will decrease  Description: Risk for impaired skin integrity will decrease  Outcome: Ongoing  Goal: Demonstration of wound healing without infection will improve  Description: Demonstration of wound healing without infection will improve  Outcome: Ongoing  Goal: Complications related to intravenous access or infusion will be avoided or minimized  Description: Complications related to intravenous access or infusion will be avoided or minimized  Outcome: Ongoing  Goal: Will show no infection signs and symptoms  Description: Will show no infection signs and symptoms  Outcome: Ongoing  Goal: Absence of new skin breakdown  Description: Absence of new skin breakdown  Outcome: Ongoing     Problem: Tissue Perfusion:  Goal: Adequacy of tissue perfusion will improve  Description: Adequacy of tissue perfusion will improve  Outcome: Ongoing     Problem: Discharge Planning:  Goal: Discharged to appropriate level of care  Description: Discharged to appropriate level of care  Outcome: Ongoing     Problem: Serum Glucose Level - Abnormal:  Goal: Ability to maintain appropriate glucose levels will improve  Description: Ability to maintain appropriate glucose levels will improve  Outcome: Ongoing     Problem: Sensory Perception - Impaired:  Goal: Ability to maintain a stable neurologic state will improve  Description: Ability to maintain a stable neurologic state will improve  Outcome: Ongoing     Problem: Respiratory:  Goal: Ability to maintain normal respiratory secretions will improve  Description: Ability to maintain normal respiratory secretions will improve  Outcome: Ongoing     Problem: ABCDS Injury Assessment  Goal: Absence of physical injury  Outcome: Ongoing     Problem: Falls - Risk of:  Goal: Will remain free from falls  Description: Will remain free from falls  Outcome: Ongoing  Goal: Absence of physical injury  Description: Absence of physical injury  Outcome: Ongoing     Problem: Safety:  Goal: Free from accidental physical injury  Description: Free from accidental physical injury  Outcome: Ongoing  Goal: Free from intentional harm  Description: Free from intentional harm  Outcome: Ongoing     Problem: Daily Care:  Goal: Daily care needs are met  Description: Daily care needs are met  Outcome: Ongoing     Problem: Pain:  Goal: Patient's pain/discomfort is manageable  Description: Patient's pain/discomfort is manageable  Outcome: Ongoing     Problem: Skin Integrity:  Goal: Skin integrity will stabilize  Description: Skin integrity will stabilize  Outcome: Ongoing     Problem: Discharge Planning:  Goal: Patients continuum of care needs are met  Description: Patients continuum of care needs are met  Outcome: Ongoing

## 2020-07-19 NOTE — PROGRESS NOTES
Pharmacy Vancomycin Consult     Vancomycin Day: 3  Current Dosin mg IV q12h    Temp max:  98.7    Recent Labs     20  0152 20  0153   BUN 10 8       Recent Labs     20  0152 20  0153   CREATININE 0.5 0.6       Recent Labs     20  0152 20  0153   WBC 10.5 10.0         Intake/Output Summary (Last 24 hours) at 2020 1630  Last data filed at 2020 1619  Gross per 24 hour   Intake 2976 ml   Output 1770 ml   Net 1206 ml       Culture Date Source Results   20 Blood No growth                 Ht Readings from Last 1 Encounters:   20 6' 3\" (1.905 m)        Wt Readings from Last 1 Encounters:   20 216 lb (98 kg)         Body mass index is 27 kg/m². Estimated Creatinine Clearance: 170 mL/min (based on SCr of 0.6 mg/dL). Trough: 10    Assessment/Plan: Change Vancomycin to 1750 mg IV q12h; Pharmacy will continue to follow and make adjustments as needed.     Electronically signed by Arline Jones, [address] [place] [address] on 2020 at 4:30 PM

## 2020-07-19 NOTE — PROGRESS NOTES
PRN  glucose, 15 g, PRN  dextrose, 12.5 g, PRN  glucagon (rDNA), 1 mg, PRN  dextrose, 100 mL/hr, PRN          PHYSICAL EXAM:     CONSTITUTIONAL:  Alert and oriented times 3, no acute distress  EXTREMITIES:   Feet warm without signs of distal embolization    SKIN:  Left lateral plantar ulcer with slough, metatarsals palpated just deep to slough, ulcers between 3rd/4th, and 4th/5th toes web space clean, but tunnel to plantar ulcer     LABS:          Recent Labs     07/17/20  1430 07/18/20  0152 07/19/20  0153   WBC 11.8* 10.5 10.0   RBC 4.32* 3.71* 3.67*   HGB 13.4* 11.2* 11.2*   HCT 38.5* 33.3* 33.0*   MCV 89.1 89.8 89.9   MCH 31.0 30.2 30.5   MCHC 34.8 33.6 33.9   RDW 12.3 12.3 12.4   * 373 356   MPV 8.5* 8.8* 8.5*           Recent Labs     07/17/20  1430 07/18/20  0152 07/19/20  0153   * 132* 133*   K 4.5 4.2 4.1   CL 92* 96* 96*   CO2 28 26 26   BUN 10 10 8   CREATININE 0.7 0.5 0.6   GLUCOSE 269* 210* 171*   CALCIUM 9.7 9.0 9.0   PROT 9.2*  --   --    LABALBU 3.4*  --   --    BILITOT 0.3  --   --    ALKPHOS 127  --   --    AST 12  --   --    ALT 14  --   --                      RADIOLOGY:               ASSESSMENT:     He has osteomyelitis left 4th and 5th metatarsals by PE and Xray  Mild PVD, should not need revasc at this time      PLAN:     IV abx  Surgery tomorrow for left 4th/5th toe, possible 3rd toe open amputation at metatarsal level  I discussed the surgery, risks, benefits, and alternatives. He seems to understand.

## 2020-07-20 ENCOUNTER — ANESTHESIA EVENT (OUTPATIENT)
Dept: OPERATING ROOM | Age: 55
DRG: 240 | End: 2020-07-20
Payer: MEDICARE

## 2020-07-20 ENCOUNTER — ANESTHESIA (OUTPATIENT)
Dept: OPERATING ROOM | Age: 55
DRG: 240 | End: 2020-07-20
Payer: MEDICARE

## 2020-07-20 VITALS
DIASTOLIC BLOOD PRESSURE: 50 MMHG | RESPIRATION RATE: 5 BRPM | TEMPERATURE: 96.8 F | OXYGEN SATURATION: 98 % | SYSTOLIC BLOOD PRESSURE: 101 MMHG

## 2020-07-20 LAB
ABO/RH: NORMAL
ANION GAP SERPL CALCULATED.3IONS-SCNC: 10 MMOL/L (ref 7–19)
ANTIBODY SCREEN: NORMAL
BUN BLDV-MCNC: 10 MG/DL (ref 6–20)
CALCIUM SERPL-MCNC: 9 MG/DL (ref 8.6–10)
CHLORIDE BLD-SCNC: 94 MMOL/L (ref 98–111)
CO2: 27 MMOL/L (ref 22–29)
CREAT SERPL-MCNC: 0.7 MG/DL (ref 0.5–1.2)
GFR AFRICAN AMERICAN: >59
GFR NON-AFRICAN AMERICAN: >60
GLUCOSE BLD-MCNC: 160 MG/DL (ref 70–99)
GLUCOSE BLD-MCNC: 219 MG/DL (ref 70–99)
GLUCOSE BLD-MCNC: 222 MG/DL (ref 70–99)
GLUCOSE BLD-MCNC: 223 MG/DL (ref 70–99)
GLUCOSE BLD-MCNC: 275 MG/DL (ref 74–109)
HCT VFR BLD CALC: 35.1 % (ref 42–52)
HEMOGLOBIN: 11.7 G/DL (ref 14–18)
MCH RBC QN AUTO: 29.9 PG (ref 27–31)
MCHC RBC AUTO-ENTMCNC: 33.3 G/DL (ref 33–37)
MCV RBC AUTO: 89.8 FL (ref 80–94)
PDW BLD-RTO: 12.3 % (ref 11.5–14.5)
PERFORMED ON: ABNORMAL
PLATELET # BLD: 358 K/UL (ref 130–400)
PMV BLD AUTO: 8.5 FL (ref 9.4–12.4)
POTASSIUM SERPL-SCNC: 4.6 MMOL/L (ref 3.5–5)
RBC # BLD: 3.91 M/UL (ref 4.7–6.1)
SARS-COV-2, PCR: NOT DETECTED
SODIUM BLD-SCNC: 131 MMOL/L (ref 136–145)
WBC # BLD: 9.5 K/UL (ref 4.8–10.8)

## 2020-07-20 PROCEDURE — 86901 BLOOD TYPING SEROLOGIC RH(D): CPT

## 2020-07-20 PROCEDURE — 86850 RBC ANTIBODY SCREEN: CPT

## 2020-07-20 PROCEDURE — 64445 NJX AA&/STRD SCIATIC NRV IMG: CPT | Performed by: NURSE ANESTHETIST, CERTIFIED REGISTERED

## 2020-07-20 PROCEDURE — 36415 COLL VENOUS BLD VENIPUNCTURE: CPT

## 2020-07-20 PROCEDURE — 2709999900 HC NON-CHARGEABLE SUPPLY: Performed by: SURGERY

## 2020-07-20 PROCEDURE — 6370000000 HC RX 637 (ALT 250 FOR IP): Performed by: SURGERY

## 2020-07-20 PROCEDURE — 80048 BASIC METABOLIC PNL TOTAL CA: CPT

## 2020-07-20 PROCEDURE — 86900 BLOOD TYPING SEROLOGIC ABO: CPT

## 2020-07-20 PROCEDURE — 2580000003 HC RX 258: Performed by: SURGERY

## 2020-07-20 PROCEDURE — 87205 SMEAR GRAM STAIN: CPT

## 2020-07-20 PROCEDURE — 3E0T3BZ INTRODUCTION OF ANESTHETIC AGENT INTO PERIPHERAL NERVES AND PLEXI, PERCUTANEOUS APPROACH: ICD-10-PCS | Performed by: ANESTHESIOLOGY

## 2020-07-20 PROCEDURE — 6370000000 HC RX 637 (ALT 250 FOR IP): Performed by: HOSPITALIST

## 2020-07-20 PROCEDURE — 28810 AMPUTATION TOE & METATARSAL: CPT | Performed by: SURGERY

## 2020-07-20 PROCEDURE — 7100000001 HC PACU RECOVERY - ADDTL 15 MIN: Performed by: SURGERY

## 2020-07-20 PROCEDURE — 87186 SC STD MICRODIL/AGAR DIL: CPT

## 2020-07-20 PROCEDURE — 87075 CULTR BACTERIA EXCEPT BLOOD: CPT

## 2020-07-20 PROCEDURE — 6360000002 HC RX W HCPCS: Performed by: SURGERY

## 2020-07-20 PROCEDURE — 3700000001 HC ADD 15 MINUTES (ANESTHESIA): Performed by: SURGERY

## 2020-07-20 PROCEDURE — 85027 COMPLETE CBC AUTOMATED: CPT

## 2020-07-20 PROCEDURE — 6360000002 HC RX W HCPCS: Performed by: HOSPITALIST

## 2020-07-20 PROCEDURE — 2580000003 HC RX 258: Performed by: HOSPITALIST

## 2020-07-20 PROCEDURE — 2500000003 HC RX 250 WO HCPCS: Performed by: NURSE ANESTHETIST, CERTIFIED REGISTERED

## 2020-07-20 PROCEDURE — 88311 DECALCIFY TISSUE: CPT

## 2020-07-20 PROCEDURE — 87070 CULTURE OTHR SPECIMN AEROBIC: CPT

## 2020-07-20 PROCEDURE — 82947 ASSAY GLUCOSE BLOOD QUANT: CPT

## 2020-07-20 PROCEDURE — 3600000002 HC SURGERY LEVEL 2 BASE: Performed by: SURGERY

## 2020-07-20 PROCEDURE — 2580000003 HC RX 258: Performed by: ANESTHESIOLOGY

## 2020-07-20 PROCEDURE — 87077 CULTURE AEROBIC IDENTIFY: CPT

## 2020-07-20 PROCEDURE — 2500000003 HC RX 250 WO HCPCS: Performed by: ANESTHESIOLOGY

## 2020-07-20 PROCEDURE — 3700000000 HC ANESTHESIA ATTENDED CARE: Performed by: SURGERY

## 2020-07-20 PROCEDURE — U0003 INFECTIOUS AGENT DETECTION BY NUCLEIC ACID (DNA OR RNA); SEVERE ACUTE RESPIRATORY SYNDROME CORONAVIRUS 2 (SARS-COV-2) (CORONAVIRUS DISEASE [COVID-19]), AMPLIFIED PROBE TECHNIQUE, MAKING USE OF HIGH THROUGHPUT TECHNOLOGIES AS DESCRIBED BY CMS-2020-01-R: HCPCS

## 2020-07-20 PROCEDURE — 88305 TISSUE EXAM BY PATHOLOGIST: CPT

## 2020-07-20 PROCEDURE — 6360000002 HC RX W HCPCS: Performed by: ANESTHESIOLOGY

## 2020-07-20 PROCEDURE — 6360000002 HC RX W HCPCS

## 2020-07-20 PROCEDURE — 0Y6N0ZD DETACHMENT AT LEFT FOOT, PARTIAL 4TH RAY, OPEN APPROACH: ICD-10-PCS | Performed by: SURGERY

## 2020-07-20 PROCEDURE — 1210000000 HC MED SURG R&B

## 2020-07-20 PROCEDURE — 3600000012 HC SURGERY LEVEL 2 ADDTL 15MIN: Performed by: SURGERY

## 2020-07-20 PROCEDURE — 87176 TISSUE HOMOGENIZATION CULTR: CPT

## 2020-07-20 PROCEDURE — 0Y6N0ZF DETACHMENT AT LEFT FOOT, PARTIAL 5TH RAY, OPEN APPROACH: ICD-10-PCS | Performed by: SURGERY

## 2020-07-20 PROCEDURE — 6360000002 HC RX W HCPCS: Performed by: NURSE ANESTHETIST, CERTIFIED REGISTERED

## 2020-07-20 PROCEDURE — 7100000000 HC PACU RECOVERY - FIRST 15 MIN: Performed by: SURGERY

## 2020-07-20 RX ORDER — IBUPROFEN 400 MG/1
400 TABLET ORAL EVERY 6 HOURS PRN
COMMUNITY

## 2020-07-20 RX ORDER — MORPHINE SULFATE 4 MG/ML
4 INJECTION, SOLUTION INTRAMUSCULAR; INTRAVENOUS EVERY 5 MIN PRN
Status: DISCONTINUED | OUTPATIENT
Start: 2020-07-20 | End: 2020-07-20

## 2020-07-20 RX ORDER — PROPOFOL 10 MG/ML
INJECTION, EMULSION INTRAVENOUS PRN
Status: DISCONTINUED | OUTPATIENT
Start: 2020-07-20 | End: 2020-07-20 | Stop reason: SDUPTHER

## 2020-07-20 RX ORDER — OLANZAPINE 20 MG/1
20 TABLET, ORALLY DISINTEGRATING ORAL NIGHTLY
COMMUNITY

## 2020-07-20 RX ORDER — ONDANSETRON 2 MG/ML
INJECTION INTRAMUSCULAR; INTRAVENOUS PRN
Status: DISCONTINUED | OUTPATIENT
Start: 2020-07-20 | End: 2020-07-20 | Stop reason: SDUPTHER

## 2020-07-20 RX ORDER — LOPERAMIDE HYDROCHLORIDE 2 MG/1
2 CAPSULE ORAL 4 TIMES DAILY PRN
COMMUNITY

## 2020-07-20 RX ORDER — SODIUM CHLORIDE 0.9 % (FLUSH) 0.9 %
10 SYRINGE (ML) INJECTION EVERY 12 HOURS SCHEDULED
Status: DISCONTINUED | OUTPATIENT
Start: 2020-07-20 | End: 2020-07-20

## 2020-07-20 RX ORDER — HYDROMORPHONE HYDROCHLORIDE 1 MG/ML
0.5 INJECTION, SOLUTION INTRAMUSCULAR; INTRAVENOUS; SUBCUTANEOUS EVERY 5 MIN PRN
Status: DISCONTINUED | OUTPATIENT
Start: 2020-07-20 | End: 2020-07-20

## 2020-07-20 RX ORDER — SCOLOPAMINE TRANSDERMAL SYSTEM 1 MG/1
1 PATCH, EXTENDED RELEASE TRANSDERMAL ONCE
Status: DISCONTINUED | OUTPATIENT
Start: 2020-07-20 | End: 2020-07-20

## 2020-07-20 RX ORDER — SODIUM CHLORIDE, SODIUM LACTATE, POTASSIUM CHLORIDE, CALCIUM CHLORIDE 600; 310; 30; 20 MG/100ML; MG/100ML; MG/100ML; MG/100ML
INJECTION, SOLUTION INTRAVENOUS CONTINUOUS
Status: DISCONTINUED | OUTPATIENT
Start: 2020-07-20 | End: 2020-07-20

## 2020-07-20 RX ORDER — FENTANYL CITRATE 50 UG/ML
INJECTION, SOLUTION INTRAMUSCULAR; INTRAVENOUS PRN
Status: DISCONTINUED | OUTPATIENT
Start: 2020-07-20 | End: 2020-07-20 | Stop reason: SDUPTHER

## 2020-07-20 RX ORDER — MIDAZOLAM HYDROCHLORIDE 1 MG/ML
2 INJECTION INTRAMUSCULAR; INTRAVENOUS
Status: COMPLETED | OUTPATIENT
Start: 2020-07-20 | End: 2020-07-20

## 2020-07-20 RX ORDER — FLUOXETINE HYDROCHLORIDE 40 MG/1
40 CAPSULE ORAL NIGHTLY
COMMUNITY

## 2020-07-20 RX ORDER — POLYETHYLENE GLYCOL 3350 17 G/17G
17 POWDER, FOR SOLUTION ORAL DAILY PRN
COMMUNITY
End: 2020-09-30

## 2020-07-20 RX ORDER — DIPHENHYDRAMINE HYDROCHLORIDE 50 MG/ML
12.5 INJECTION INTRAMUSCULAR; INTRAVENOUS
Status: DISCONTINUED | OUTPATIENT
Start: 2020-07-20 | End: 2020-07-20

## 2020-07-20 RX ORDER — MIDAZOLAM HYDROCHLORIDE 1 MG/ML
2 INJECTION INTRAMUSCULAR; INTRAVENOUS
Status: ACTIVE | OUTPATIENT
Start: 2020-07-20 | End: 2020-07-20

## 2020-07-20 RX ORDER — SODIUM CHLORIDE 9 MG/ML
INJECTION, SOLUTION INTRAVENOUS CONTINUOUS
Status: DISCONTINUED | OUTPATIENT
Start: 2020-07-20 | End: 2020-07-20

## 2020-07-20 RX ORDER — MIDAZOLAM HYDROCHLORIDE 1 MG/ML
INJECTION INTRAMUSCULAR; INTRAVENOUS
Status: COMPLETED
Start: 2020-07-20 | End: 2020-07-20

## 2020-07-20 RX ORDER — EPHEDRINE SULFATE 50 MG/ML
INJECTION, SOLUTION INTRAVENOUS PRN
Status: DISCONTINUED | OUTPATIENT
Start: 2020-07-20 | End: 2020-07-20 | Stop reason: SDUPTHER

## 2020-07-20 RX ORDER — RISPERIDONE 1 MG/1
1 TABLET, ORALLY DISINTEGRATING ORAL EVERY 8 HOURS PRN
Status: ON HOLD | COMMUNITY
End: 2020-07-23 | Stop reason: HOSPADM

## 2020-07-20 RX ORDER — MORPHINE SULFATE 4 MG/ML
2 INJECTION, SOLUTION INTRAMUSCULAR; INTRAVENOUS EVERY 5 MIN PRN
Status: DISCONTINUED | OUTPATIENT
Start: 2020-07-20 | End: 2020-07-20

## 2020-07-20 RX ORDER — HYDROXYZINE PAMOATE 25 MG/1
25 CAPSULE ORAL DAILY
Status: ON HOLD | COMMUNITY
End: 2020-07-23 | Stop reason: HOSPADM

## 2020-07-20 RX ORDER — FENTANYL CITRATE 50 UG/ML
50 INJECTION, SOLUTION INTRAMUSCULAR; INTRAVENOUS
Status: DISCONTINUED | OUTPATIENT
Start: 2020-07-20 | End: 2020-07-20

## 2020-07-20 RX ORDER — MEPERIDINE HYDROCHLORIDE 50 MG/ML
12.5 INJECTION INTRAMUSCULAR; INTRAVENOUS; SUBCUTANEOUS EVERY 5 MIN PRN
Status: DISCONTINUED | OUTPATIENT
Start: 2020-07-20 | End: 2020-07-20

## 2020-07-20 RX ORDER — MORPHINE SULFATE 4 MG/ML
4 INJECTION, SOLUTION INTRAMUSCULAR; INTRAVENOUS
Status: DISCONTINUED | OUTPATIENT
Start: 2020-07-20 | End: 2020-07-20

## 2020-07-20 RX ORDER — SODIUM HYPOCHLORITE 1.25 MG/ML
SOLUTION TOPICAL PRN
Status: DISCONTINUED | OUTPATIENT
Start: 2020-07-20 | End: 2020-07-20 | Stop reason: ALTCHOICE

## 2020-07-20 RX ORDER — SODIUM CHLORIDE 0.9 % (FLUSH) 0.9 %
10 SYRINGE (ML) INJECTION PRN
Status: DISCONTINUED | OUTPATIENT
Start: 2020-07-20 | End: 2020-07-20

## 2020-07-20 RX ORDER — ROPIVACAINE HYDROCHLORIDE 5 MG/ML
INJECTION, SOLUTION EPIDURAL; INFILTRATION; PERINEURAL
Status: COMPLETED | OUTPATIENT
Start: 2020-07-20 | End: 2020-07-20

## 2020-07-20 RX ORDER — HYDROMORPHONE HYDROCHLORIDE 1 MG/ML
0.25 INJECTION, SOLUTION INTRAMUSCULAR; INTRAVENOUS; SUBCUTANEOUS EVERY 5 MIN PRN
Status: DISCONTINUED | OUTPATIENT
Start: 2020-07-20 | End: 2020-07-20

## 2020-07-20 RX ORDER — LIDOCAINE HYDROCHLORIDE 10 MG/ML
1 INJECTION, SOLUTION EPIDURAL; INFILTRATION; INTRACAUDAL; PERINEURAL
Status: COMPLETED | OUTPATIENT
Start: 2020-07-20 | End: 2020-07-20

## 2020-07-20 RX ORDER — METOCLOPRAMIDE HYDROCHLORIDE 5 MG/ML
10 INJECTION INTRAMUSCULAR; INTRAVENOUS
Status: DISCONTINUED | OUTPATIENT
Start: 2020-07-20 | End: 2020-07-20

## 2020-07-20 RX ORDER — RANITIDINE 150 MG/1
150 TABLET ORAL 2 TIMES DAILY PRN
COMMUNITY
End: 2020-09-30 | Stop reason: CLARIF

## 2020-07-20 RX ORDER — HYDRALAZINE HYDROCHLORIDE 20 MG/ML
5 INJECTION INTRAMUSCULAR; INTRAVENOUS EVERY 10 MIN PRN
Status: DISCONTINUED | OUTPATIENT
Start: 2020-07-20 | End: 2020-07-20

## 2020-07-20 RX ORDER — LABETALOL HYDROCHLORIDE 5 MG/ML
5 INJECTION, SOLUTION INTRAVENOUS EVERY 10 MIN PRN
Status: DISCONTINUED | OUTPATIENT
Start: 2020-07-20 | End: 2020-07-20

## 2020-07-20 RX ORDER — CHLORHEXIDINE GLUCONATE 4 G/100ML
SOLUTION TOPICAL ONCE
Status: COMPLETED | OUTPATIENT
Start: 2020-07-20 | End: 2020-07-20

## 2020-07-20 RX ADMIN — ATORVASTATIN CALCIUM 10 MG: 20 TABLET, FILM COATED ORAL at 16:29

## 2020-07-20 RX ADMIN — RISPERIDONE 4 MG: 1 TABLET ORAL at 22:33

## 2020-07-20 RX ADMIN — FENTANYL CITRATE 25 MCG: 50 INJECTION INTRAMUSCULAR; INTRAVENOUS at 14:53

## 2020-07-20 RX ADMIN — ONDANSETRON HYDROCHLORIDE 4 MG: 2 INJECTION, SOLUTION INTRAMUSCULAR; INTRAVENOUS at 15:24

## 2020-07-20 RX ADMIN — VANCOMYCIN HYDROCHLORIDE 1750 MG: 10 INJECTION, POWDER, LYOPHILIZED, FOR SOLUTION INTRAVENOUS at 14:01

## 2020-07-20 RX ADMIN — MIDAZOLAM HYDROCHLORIDE 2 MG: 1 INJECTION INTRAMUSCULAR; INTRAVENOUS at 14:21

## 2020-07-20 RX ADMIN — PHENYLEPHRINE HYDROCHLORIDE 50 MCG: 10 INJECTION INTRAVENOUS at 14:44

## 2020-07-20 RX ADMIN — FENTANYL CITRATE 25 MCG: 50 INJECTION INTRAMUSCULAR; INTRAVENOUS at 15:06

## 2020-07-20 RX ADMIN — OLANZAPINE 10 MG: 10 TABLET, FILM COATED ORAL at 22:33

## 2020-07-20 RX ADMIN — FAMOTIDINE 20 MG: 20 TABLET ORAL at 22:33

## 2020-07-20 RX ADMIN — INSULIN LISPRO 2 UNITS: 100 INJECTION, SOLUTION INTRAVENOUS; SUBCUTANEOUS at 17:30

## 2020-07-20 RX ADMIN — CEFEPIME 2 G: 2 INJECTION, POWDER, FOR SOLUTION INTRAMUSCULAR; INTRAVENOUS at 05:29

## 2020-07-20 RX ADMIN — FLUOXETINE HYDROCHLORIDE 40 MG: 20 CAPSULE ORAL at 12:32

## 2020-07-20 RX ADMIN — MIDAZOLAM 2 MG: 1 INJECTION INTRAMUSCULAR; INTRAVENOUS at 14:21

## 2020-07-20 RX ADMIN — LIDOCAINE HYDROCHLORIDE 5 ML: 10 INJECTION, SOLUTION EPIDURAL; INFILTRATION; INTRACAUDAL; PERINEURAL at 14:38

## 2020-07-20 RX ADMIN — TRAZODONE HYDROCHLORIDE 50 MG: 50 TABLET ORAL at 22:33

## 2020-07-20 RX ADMIN — EPHEDRINE SULFATE 5 MG: 50 INJECTION INTRAMUSCULAR; INTRAVENOUS; SUBCUTANEOUS at 14:57

## 2020-07-20 RX ADMIN — INSULIN LISPRO 1 UNITS: 100 INJECTION, SOLUTION INTRAVENOUS; SUBCUTANEOUS at 22:34

## 2020-07-20 RX ADMIN — Medication 15 UNITS: at 22:34

## 2020-07-20 RX ADMIN — PHENYLEPHRINE HYDROCHLORIDE 50 MCG: 10 INJECTION INTRAVENOUS at 15:04

## 2020-07-20 RX ADMIN — EPHEDRINE SULFATE 5 MG: 50 INJECTION INTRAMUSCULAR; INTRAVENOUS; SUBCUTANEOUS at 14:44

## 2020-07-20 RX ADMIN — FENTANYL CITRATE 25 MCG: 50 INJECTION INTRAMUSCULAR; INTRAVENOUS at 15:23

## 2020-07-20 RX ADMIN — FENTANYL CITRATE 50 MCG: 50 INJECTION INTRAMUSCULAR; INTRAVENOUS at 14:38

## 2020-07-20 RX ADMIN — SODIUM CHLORIDE, SODIUM LACTATE, POTASSIUM CHLORIDE, AND CALCIUM CHLORIDE: 600; 310; 30; 20 INJECTION, SOLUTION INTRAVENOUS at 14:30

## 2020-07-20 RX ADMIN — EPHEDRINE SULFATE 5 MG: 50 INJECTION INTRAMUSCULAR; INTRAVENOUS; SUBCUTANEOUS at 15:13

## 2020-07-20 RX ADMIN — ROPIVACAINE HYDROCHLORIDE 30 ML: 5 INJECTION, SOLUTION EPIDURAL; INFILTRATION; PERINEURAL at 14:29

## 2020-07-20 RX ADMIN — PHENYLEPHRINE HYDROCHLORIDE 50 MCG: 10 INJECTION INTRAVENOUS at 15:13

## 2020-07-20 RX ADMIN — EPHEDRINE SULFATE 5 MG: 50 INJECTION INTRAMUSCULAR; INTRAVENOUS; SUBCUTANEOUS at 15:04

## 2020-07-20 RX ADMIN — PROPOFOL 180 MG: 10 INJECTION, EMULSION INTRAVENOUS at 14:38

## 2020-07-20 RX ADMIN — FENTANYL CITRATE 25 MCG: 50 INJECTION INTRAMUSCULAR; INTRAVENOUS at 14:43

## 2020-07-20 RX ADMIN — PHENYLEPHRINE HYDROCHLORIDE 50 MCG: 10 INJECTION INTRAVENOUS at 14:56

## 2020-07-20 RX ADMIN — VANCOMYCIN HYDROCHLORIDE 1750 MG: 10 INJECTION, POWDER, LYOPHILIZED, FOR SOLUTION INTRAVENOUS at 02:04

## 2020-07-20 RX ADMIN — PHENYLEPHRINE HYDROCHLORIDE 50 MCG: 10 INJECTION INTRAVENOUS at 14:50

## 2020-07-20 RX ADMIN — CHLORHEXIDINE GLUCONATE: 213 SOLUTION TOPICAL at 06:00

## 2020-07-20 RX ADMIN — EPHEDRINE SULFATE 5 MG: 50 INJECTION INTRAMUSCULAR; INTRAVENOUS; SUBCUTANEOUS at 14:50

## 2020-07-20 RX ADMIN — CEFEPIME 2 G: 2 INJECTION, POWDER, FOR SOLUTION INTRAMUSCULAR; INTRAVENOUS at 16:39

## 2020-07-20 ASSESSMENT — ENCOUNTER SYMPTOMS: SHORTNESS OF BREATH: 0

## 2020-07-20 ASSESSMENT — PAIN SCALES - GENERAL: PAINLEVEL_OUTOF10: 0

## 2020-07-20 ASSESSMENT — LIFESTYLE VARIABLES: SMOKING_STATUS: 1

## 2020-07-20 NOTE — ANESTHESIA POSTPROCEDURE EVALUATION
Department of Anesthesiology  Postprocedure Note    Patient: Guillermo Kramer  MRN: [id number]  YOB: 1965  Date of evaluation: 7/20/2020  Time:  3:41 PM     Procedure Summary     Date:  07/20/20 Room / Location:  [address]    Anesthesia Start:  1430 Anesthesia Stop:  6693    Procedure:  4TH AND 5TH OPEN TOE AMPUTATION (Left ) Diagnosis:  (L73.082)    Surgeon:  Manish Grande MD Responsible Provider:  CAMDEN Wakefield CRNA    Anesthesia Type:  regional, TIVA, MAC ASA Status:  3          Anesthesia Type: regional, TIVA, MAC    Leah Phase I: Leah Score: 8    Leah Phase II:      Last vitals: Reviewed and per EMR flowsheets.        Anesthesia Post Evaluation    Patient location during evaluation: PACU  Patient participation: complete - patient cannot participate  Level of consciousness: responsive to noxious stimuli  Pain score: 0  Airway patency: patent  Nausea & Vomiting: no nausea and no vomiting  Complications: no  Cardiovascular status: blood pressure returned to baseline and hemodynamically stable  Respiratory status: acceptable, face mask, oral airway, nonlabored ventilation and spontaneous ventilation  Hydration status: euvolemic

## 2020-07-20 NOTE — PROGRESS NOTES
Infectious Diseases Progress Note    Patient:  Toy Gilmore  YOB: 1965  MRN: [id number]   Admit date: 7/17/2020   Admitting Physician: Ryland Snell MD  Primary Care Physician: No primary care provider on file. Chief Complaint/Interval History: He is comfortable. He had no fever. Tolerating current antibiotic treatment. Interval notes reviewed. Undergoing debridement today. In/Out    Intake/Output Summary (Last 24 hours) at 7/20/2020 0741  Last data filed at 7/19/2020 1619  Gross per 24 hour   Intake 2016 ml   Output 1770 ml   Net 246 ml     Allergies:    Allergies   Allergen Reactions    Clozaril [Clozapine]     Haldol [Haloperidol]     Lithium     Navane [Thiothixene]      Current Meds: vancomycin (VANCOCIN) 1,750 mg in dextrose 5 % 500 mL IVPB, Q12H  insulin glargine (LANTUS) injection vial 15 Units, BID  ceFEPIme (MAXIPIME) 2 g in sterile water 20 mL IV syringe, Q12H  FLUoxetine (PROZAC) capsule 40 mg, Daily  hydrOXYzine (ATARAX) tablet 25 mg, TID PRN  OLANZapine (ZYPREXA) tablet 10 mg, Nightly  risperiDONE (RISPERDAL) tablet 4 mg, Nightly  atorvastatin (LIPITOR) tablet 10 mg, Daily  traZODone (DESYREL) tablet 50 mg, Nightly  sodium chloride flush 0.9 % injection 10 mL, 2 times per day  sodium chloride flush 0.9 % injection 10 mL, PRN  potassium chloride (KLOR-CON M) extended release tablet 40 mEq, PRN    Or  potassium bicarb-citric acid (EFFER-K) effervescent tablet 40 mEq, PRN    Or  potassium chloride 10 mEq/100 mL IVPB (Peripheral Line), PRN  magnesium sulfate 1 g in dextrose 5% 100 mL IVPB, PRN  acetaminophen (TYLENOL) tablet 650 mg, Q6H PRN    Or  acetaminophen (TYLENOL) suppository 650 mg, Q6H PRN  polyethylene glycol (GLYCOLAX) packet 17 g, Daily PRN  promethazine (PHENERGAN) tablet 12.5 mg, Q6H PRN    Or  ondansetron (ZOFRAN) injection 4 mg, Q6H PRN  famotidine (PEPCID) tablet 20 mg, BID  vancomycin (VANCOCIN) intermittent dosing (placeholder), RX Placeholder  insulin lispro

## 2020-07-20 NOTE — PLAN OF CARE
Nutrition Problem #1: Increased nutrient needs  Intervention: Food and/or Nutrient Delivery: Continue NPO  Nutritional Goals: PO intake >50%, s/s of wound healing    Problem: Nutrition  Goal: Optimal nutrition therapy  Outcome: Ongoing

## 2020-07-20 NOTE — PROGRESS NOTES
Comprehensive Nutrition Assessment    Type and Reason for Visit:  Initial, Positive Nutrition Screen    Nutrition Recommendations/Plan: Follow for diet advancement and PO intake. Nutrition Assessment:  Positive nutrition screen for wounds to feet. Pt NPO for amputation of 4th, 5th, and possibly 3rd toes today. Pt's PO intake has been sufficient so far, most meals averaging >75%. Will continue to follow and implement intervention post-surgery. Malnutrition Assessment:  Malnutrition Status:  No malnutrition    Context:  Acute Illness     Findings of the 6 clinical characteristics of malnutrition:  Energy Intake:  No significant decrease in energy intake  Weight Loss:  No significant weight loss     Body Fat Loss:  No significant body fat loss     Muscle Mass Loss:  No significant muscle mass loss    Fluid Accumulation:  1 - Mild Extremities   Strength:  Not Performed    Estimated Daily Nutrient Needs:  Energy (kcal):  2799-7318; Weight Used for Energy Requirements:  Current     Protein (g):  121-145; Weight Used for Protein Requirements:  Current(1.25-1. 5)        Fluid (ml/day):  4119-1375; Weight Used for Fluid Requirements:  Current      Wounds:  Diabetic Ulcer, Multiple       Current Nutrition Therapies:    Diet NPO, After Midnight Exceptions are: Ice Chips, Sips with Meds, Popsicles    Anthropometric Measures:  · Height: 6' 3\" (190.5 cm)  · Current Body Weight: 213 lb (96.6 kg)   · Admission Body Weight:      · Usual Body Weight:       · Ideal Body Weight: 196 lbs; % Ideal Body Weight 108.7 %   · BMI: 26.6  · Adjusted Body Weight:  ; No Adjustment   · Adjusted BMI:      · BMI Categories: Overweight (BMI 25.0-29. 9)       Nutrition Diagnosis:   · Increased nutrient needs related to increase demand for energy/nutrients as evidenced by wounds      Nutrition Interventions:   Food and/or Nutrient Delivery:  Continue NPO  Nutrition Education/Counseling:  No recommendation at this time   Coordination of Nutrition Care:  Continued Inpatient Monitoring    Goals:  PO intake >50%, s/s of wound healing       Nutrition Monitoring and Evaluation:   Food/Nutrient Intake Outcomes:  Food and Nutrient Intake  Physical Signs/Symptoms Outcomes:  Biochemical Data, Skin, Weight     Discharge Planning:     Too soon to determine     Electronically signed by Jose Jiménez RD on 7/20/20 at 10:32 AM CDT    Contact: [phone]

## 2020-07-20 NOTE — PROGRESS NOTES
[place], [place], [address]        DEPARTMENT OF HOSPITALIST MEDICINE        PROGRESS  NOTE:    NOTE: Portions of this note have been copied forward, however, changed to reflect the most current clinical status of this patient. Patient:  Torsten Oropeza  YOB: 1965  Date of Service: 7/20/2020  MRN: [id number]   Acct: [de-identified]   Primary Care Physician: No primary care provider on file. Advance Directive: Full Code  Admit Date: 7/17/2020       Hospital Day: 3      CHIEF COMPLAINT:  Chief Complaint   Patient presents with    Wound Infection     arrived via EMS from [facility] with quan foot ulcers       SUBJECTIVE / [address]:    7/20/2020  Patient is n.p.o. after midnight. He is going to go to surgery later on today for his left foot toe amputations. His list of psych medications received from [facility] and updated in the med rec sheet. 7/19/2020  Patient still on IV antibiotics and followed up by ID and vascular surgery. Vascular surgery plans on taking him to the OR for amputation of his left fourth, fifth and possibly third toes. 7/18/2020  Patient is receiving IV antibiotics. Blood sugar levels are elevated in 200s. I increase Lantus insulin to 15 units subcu twice daily. His hemoglobin A1c is 11%. We are awaiting consults by ID and vascular surgery. 7/17/2020  HISTORY OF PRESENT ILLNESS:  Michael Elizabeth is an 50 y.o. male. He is a very unfortunate male with chronic smoking, PVD and chronic medical issues who is a nursing home resident. He was sent to the ER for evaluation due to bilateral foot ulcers for the last 5 months which are now getting worse. Work-up was done in the ER including x-ray of the left foot which showed findings consistent with osteomyelitis. He also has bilateral PVD with decreased palpations.   He was started on IV cefepime and vancomycin and is being admitted for medical management, wound care, vascular surgery and ID evaluation. REVIEW  OF  SYSTEMS:    Constitutional:  No fevers, chills, nausea, vomiting,    Lungs:   No hemoptysis, pleurisy   Heart:  No chest pressure with exertion, palpitations,    Abdomen:   No new masses, no bright red blood per rectum   Extremities: ++pain while ambulating due to bilateral foot ulcers   Neurologic:  Bilateral mild numbness of feet       PAST MEDICAL HISTORY:  Past Medical History:   Diagnosis Date    Anxiety     Diabetes ([facility] [address]     Sch[facility] ([facility]          PAST SURGICAL HISTORY:  History reviewed. No pertinent surgical history. FAMILY HISTORY:  History reviewed. No pertinent family history. OBJECTIVE:  /72   Pulse 70   Temp 97.4 °F (36.3 °C) (Temporal)   Resp 16   Ht 6' 3\" (1.905 m)   Wt 213 lb 9.6 oz (96.9 kg)   SpO2 97%   BMI 26.70 kg/m²   No intake/output data recorded.     PHYSICAL  EXAMINATION:    NAZANIN:  Awake, alert, oriented x 3, patient appears tired and fatigued   Head/Eyes:  Normocephalic, atraumatic, EOMI and PERRLA bilaterally   Respiratory:   Bilateral decreased air entry in both lung fields, mild B/L crackles, symmetric expansion of chest   Cardiovascular:  Regular rate and rhythm, S1+S2+0, no murmurs/rubs   Abdomen:   Soft, non-tender, bowel sounds +ve, no organomegaly   Extremities: Moves all, decreased range of motion, bilateral feet ulcerations with surrounding erythema   Neurologic: Awake, alert, oriented x 3, cranial nerves II-XII intact, no focal neurological deficits, sensory system intact   Psychiatric: Normal mood, non-suicidal, flat affect       CURRENT MEDICATIONS:  Scheduled:   vancomycin  1,750 mg Intravenous Q12H    insulin glargine  15 Units Subcutaneous BID    cefepime  2 g Intravenous Q12H    FLUoxetine  40 mg Oral Daily    OLANZapine  10 mg Oral Nightly    risperiDONE  4 mg Oral Nightly    atorvastatin  10 mg Oral Daily    traZODone  50 mg Oral Nightly    sodium chloride flush  10 mL Intravenous 2 times per day    famotidine  20 mg Oral BID    vancomycin (VANCOCIN) intermittent dosing (placeholder)   Other RX Placeholder    insulin lispro  0-6 Units Subcutaneous TID WC    insulin lispro  0-3 Units Subcutaneous Nightly    nicotine  1 patch Transdermal Daily        PRN:  hydrOXYzine, 25 mg, TID PRN  sodium chloride flush, 10 mL, PRN  potassium chloride, 40 mEq, PRN    Or  potassium alternative oral replacement, 40 mEq, PRN    Or  potassium chloride, 10 mEq, PRN  magnesium sulfate, 1 g, PRN  acetaminophen, 650 mg, Q6H PRN    Or  acetaminophen, 650 mg, Q6H PRN  polyethylene glycol, 17 g, Daily PRN  promethazine, 12.5 mg, Q6H PRN    Or  ondansetron, 4 mg, Q6H PRN  glucose, 15 g, PRN  dextrose, 12.5 g, PRN  glucagon (rDNA), 1 mg, PRN  dextrose, 100 mL/hr, PRN        Infusions:   dextrose         Laboratory Data:  Recent Labs     07/18/20  0152 07/19/20  0153 07/20/20  0344   WBC 10.5 10.0 9.5   HGB 11.2* 11.2* 11.7*    356 358     Recent Labs     07/18/20  0152 07/19/20  0153 07/20/20  0344   * 133* 131*   K 4.2 4.1 4.6   CL 96* 96* 94*   CO2 26 26 27   BUN 10 8 10   CREATININE 0.5 0.6 0.7   GLUCOSE 210* 171* 275*     Recent Labs     07/17/20  1430   AST 12   ALT 14   BILITOT 0.3   ALKPHOS 127     Troponin T: No results for input(s): TROPONINI in the last 72 hours. Pro-BNP: No results for input(s): BNP in the last 72 hours. INR: No results for input(s): INR in the last 72 hours. UA:  Recent Labs     07/17/20  1705   COLORU DK YELLOW   PHUR 6.0   CLARITYU CLOUDY*   SPECGRAV 1.031   LEUKOCYTESUR Negative   UROBILINOGEN 1.0   BILIRUBINUR SMALL*   BLOODU Negative   GLUCOSEU >=1000*     A1C:   Recent Labs     07/18/20  0152   LABA1C 11.0*     ABG:No results for input(s): PHART, PGL7QWG, PO2ART, VWG0GOS, BEART, HGBAE, U2ZFMZIY, CARBOXHGBART in the last 72 hours. Imaging:          Xr Foot Left (min 3 Views)    Result Date: 7/17/2020  1.  Findings are highly suggestive of acute osteomyelitis in the 4th and 5th metatarsal heads. Involvement of the base of the 4th proximal phalanx is also suspected. 2. Chronic osteomyelitis of the 1st metatarsal head and great toe and the 3rd metatarsal head. 3. Soft tissue air in the plantar aspect of the 4th MTP joint. Signed by Dr Tess Lloyd on 7/17/2020 2:20 PM    Xr Foot Right (min 3 Views)    Result Date: 7/17/2020  1. No evidence of acute bony abnormality in the right foot. While there is no radiographic evidence of osteomyelitis, the findings of acute osteomyelitis do not appear on radiographs for 10 to 14 days. MRI would be more sensitive and specific for diagnosis of acute osteomyelitis. If the patient is not a candidate for MRI, nuclear medicine three-phase bone scan would be recommended. Signed by Dr Tess Lloyd on 7/17/2020 2:22 PM       ASSESSMENT & PLAN:    Principal Problem:    Osteomyelitis of ankle or foot, acute, left (HCC)  Active Problems:    Major depression, recurrent, chronic (HCC)    Bipolar disorder ([facility] [address]    Psychiatric disorder    Leukocytosis    Skin ulcers of foot, bilateral (HCC)    PVD (peripheral vascular disease) ([address]    Tobacco abuse counseling    Cigarette nicotine dependence with nicotine-induced disorder    Schizophrenia ([facility] [address] [facility]  Resolved Problems:    * No resolved hospital problems.  *    Continue with IV antibiotic in the form of cefepime and vancomycin  Continue with her psychiatric medications which are updated after receiving confirmed medication sheet from [address] [place], [place] [address] with wound care and dressing changes  Vascular surgery and ID consultations reviewed and appreciated  Continue with IV hydration  Increased Lantus insulin to 15 units subcu twice daily for better control of diabetes mellitus  Patient is poorly controlled diabetes mellitus as evidenced by hemoglobin A1c of 11%  Blood sugar levels under better control after increasing Lantus Insulin yesterday  Patient has mildly diminished blood flow to bilateral arterial vasculature in both legs on lower leg arterial duplex results  Patient is going for surgery today for amputation of his toes on left foot  Patient started on IV hydration with normal saline at 100 cc/h      Repeat labs in a.m. Electrolyte replacement as per protocol. Patient will be monitored very closely on the floor. Further recommendations as per the hospital course. Discharge Plan: Once patient is clinically and symptomatically stable and cleared by specialists on the case      Patient  is on DVT prophylaxis  Current medications reviewed  Lab work reviewed  Radiology/Chest x-ray films reviewed  Treatment recommendations from suspecialities reviewed, appreciated and agreed with  Discussed with the nurse and addressed all questions/concerns  Discussed with Patient and/or Family at the bedside in detail . .. they understand and agree with the management plan. Giovani Chaves MD  7/20/2020 1:00 PM      DISCLAIMER: This note was created with electronic voice recognition which does have occasional errors. If you have any questions regarding the content within the note please do not hesitate to contact me. .. Thanks.

## 2020-07-20 NOTE — BRIEF OP NOTE
Brief Postoperative Note      Patient: Torsten Oropeza  YOB: 1965  MRN: [id number]    Date of Procedure: 7/20/2020    Pre-Op Diagnosis: M86.172    Post-Op Diagnosis: Same       Procedure(s):  1.  LEFT 4TH AND 5TH OPEN TOE AMPUTATION (METATARSAL LEVEL)    Surgeon(s):  Sacha Santana MD    Assistant:  * No surgical staff found *    Anesthesia: General    Estimated Blood Loss (mL): less than 708     Complications: None    Specimens:   ID Type Source Tests Collected by Time Destination   1 : 4th metatarsal bone for cultures Tissue Toe CULTURE, SURGICAL Sacha Santana MD 7/20/2020 1504    A : fourth and 5th left toes Tissue Toe SURGICAL PATHOLOGY Sacha Santana MD 7/20/2020 1524        Implants:  * No implants in log *      Drains: * No LDAs found *    Findings: GOOD ARTERIAL/PULSATILE FLOW, COMPLETELY NECROTIC 4TH DISTAL METATARSAL     Electronically signed by Hannah Burkett MD on 7/20/2020 at 3:35 PM

## 2020-07-20 NOTE — PROGRESS NOTES
Medication list faxed to us from [facility]. Med list was updated accordingly. Dr. Kerri Gurrola notified.

## 2020-07-20 NOTE — ANESTHESIA PRE PROCEDURE
Department of Anesthesiology  Preprocedure Note       Name:  Louis Rondon   Age:  50 y.o.  :  1965                                          MRN:  [id number]         Date:  2020      Surgeon: Carlos Munoz):  Roseanne Neves MD    Procedure: Procedure(s):  3RD, 4TH, AND 5TH OPEN TOE AMPUTATION    Medications prior to admission:   Prior to Admission medications    Medication Sig Start Date End Date Taking?  Authorizing Provider   ibuprofen (ADVIL;MOTRIN) 400 MG tablet Take 400 mg by mouth every 6 hours as needed for Pain or Fever   Yes Historical Provider, MD   raNITIdine (ZANTAC) 150 MG tablet Take 150 mg by mouth 2 times daily as needed for Heartburn   Yes Historical Provider, MD   polyethylene glycol (GLYCOLAX) 17 g packet Take 17 g by mouth daily as needed for Constipation   Yes Historical Provider, MD   loperamide (IMODIUM) 2 MG capsule Take 2 mg by mouth 4 times daily as needed for Diarrhea   Yes Historical Provider, MD   Dextromethorphan-guaiFENesin (ROBITUSSIN DM PO) Take by mouth as needed   Yes Historical Provider, MD   risperiDONE microspheres ER (RISPERDAL CONSTA) 50 MG injection Inject 50 mg into the muscle every 14 days Due 2020  Yes Historical Provider, MD   hydrOXYzine (VISTARIL) 25 MG capsule Take 25 mg by mouth daily   Yes Historical Provider, MD   FLUoxetine (PROZAC) 40 MG capsule Take 40 mg by mouth nightly   Yes Historical Provider, MD   risperiDONE (RISPERDAL M-TAB) 1 MG disintegrating tablet Take 1 mg by mouth every 8 hours as needed   Yes Historical Provider, MD   OLANZapine zydis (ZYPREXA) 20 MG disintegrating tablet Take 20 mg by mouth nightly   Yes Historical Provider, MD   insulin glargine (BASAGLAR KWIKPEN) 100 UNIT/ML injection pen Inject 15 Units into the skin nightly   Yes Historical Provider, MD   hydrOXYzine (ATARAX) 25 MG tablet Take 25 mg by mouth 3 times daily as needed for Itching   Yes Historical Provider, MD   risperiDONE (RISPERDAL) 4 MG tablet Take 4 mg by mouth nightly   Yes Historical Provider, MD   simvastatin (ZOCOR) 20 MG tablet Take 20 mg by mouth nightly   Yes Historical Provider, MD   traZODone (DESYREL) 50 MG tablet Take 50 mg by mouth nightly   Yes Historical Provider, MD       Current medications:    Current Facility-Administered Medications   Medication Dose Route Frequency Provider Last Rate Last Dose    [MAR Hold] 0.9 % sodium chloride infusion   Intravenous Continuous Giovani Chaves MD        lactated ringers infusion   Intravenous Continuous Pardeep Oquendo MD        [facility] Hold] vancomycin (VANCOCIN) 1,750 mg in dextrose 5 % 500 mL IVPB  1,750 mg Intravenous Q12H Ada Banks  mL/hr at 07/20/20 1401 1,750 mg at 07/20/20 1401    [MAR Hold] insulin glargine (LANTUS) injection vial 15 Units  15 Units Subcutaneous BID Giovani Chaves MD   15 Units at 07/19/20 2240    [MAR Hold] ceFEPIme (MAXIPIME) 2 g in sterile water 20 mL IV syringe  2 g Intravenous Q12H Giovani Chaves MD   2 g at 07/20/20 0529    [MAR Hold] FLUoxetine (PROZAC) capsule 40 mg  40 mg Oral Daily Giovani Chaves MD   40 mg at 07/20/20 1232    [MAR Hold] hydrOXYzine (ATARAX) tablet 25 mg  25 mg Oral TID PRN Giovani Chaves MD   25 mg at 07/18/20 2028    [MAR Hold] OLANZapine (ZYPREXA) tablet 10 mg  10 mg Oral Nightly Giovani Chaves MD   10 mg at 07/19/20 2240    [MAR Hold] risperiDONE (RISPERDAL) tablet 4 mg  4 mg Oral Nightly Giovani Chaves MD   4 mg at 07/19/20 2240    [MAR Hold] atorvastatin (LIPITOR) tablet 10 mg  10 mg Oral Daily Giovani Chaves MD   10 mg at 07/19/20 0933    [MAR Hold] traZODone (DESYREL) tablet 50 mg  50 mg Oral Nightly Giovani Chaves MD   50 mg at 07/19/20 2240    [MAR Hold] sodium chloride flush 0.9 % injection 10 mL  10 mL Intravenous 2 times per day Giovani Chaves MD   10 mL at 07/18/20 2028    [MAR Hold] sodium chloride flush 0.9 % injection 10 mL  10 mL Intravenous PRN Giovani Chaves MD        [MAR Hold] potassium chloride (KLOR-CON M) extended release tablet 40 Haldol [Haloperidol]     Lithium     Navane [Thiothixene]        Problem List:    Patient Active Problem List   Diagnosis Code    Osteomyelitis of ankle or foot, acute, left (HCC) M86.172    Major depression, recurrent, chronic (HCC) F33.9    Bipolar disorder ([facility] [address] [facility] F31.9    Psychiatric disorder F99    Leukocytosis D72.829    [facility], bilateral [facility] L97.519, L97.529    PVD (peripheral vascular disease) ([facility] I73.9    Tobacco abuse counseling Z71.6    Cigarette nicotine dependence with nicotine-induced disorder F17.219    Schizophrenia ([facility] [facility] F20.9       Past Medical History:        Diagnosis Date    Anxiety     Diabetes ([facility]     Schizophrenic disorder ([facility]        Past Surgical History:  History reviewed. No pertinent surgical history. Social History:    Social History     Tobacco Use    Smoking status: Never Smoker    Smokeless tobacco: Never Used   Substance Use Topics    Alcohol use: Not Currently                                Counseling given: Not Answered      Vital Signs (Current):   Vitals:    07/20/20 0105 07/20/20 0726 07/20/20 1027 07/20/20 1106   BP:  135/83  113/72   Pulse:  70  70   Resp:  16  16   Temp:  97.8 °F (36.6 °C)  97.4 °F (36.3 °C)   TempSrc:  Temporal  Temporal   SpO2:  97%  97%   Weight: 213 lb 9.6 oz (96.9 kg)      Height:   6' 3\" (1.905 m)                                               BP Readings from Last 3 Encounters:   07/20/20 113/72       NPO Status:                                                                                 BMI:   Wt Readings from Last 3 Encounters:   07/20/20 213 lb 9.6 oz (96.9 kg)     Body mass index is 26.7 kg/m².     CBC:   Lab Results   Component Value Date    WBC 9.5 07/20/2020    RBC 3.91 07/20/2020    HGB 11.7 07/20/2020    HCT 35.1 07/20/2020    MCV 89.8 07/20/2020    RDW 12.3 07/20/2020     07/20/2020       CMP:   Lab Results   Component Value Date     07/20/2020    K 4.6 07/20/2020    K 4.5 07/17/2020 CL 94 07/20/2020    CO2 27 07/20/2020    BUN 10 07/20/2020    CREATININE 0.7 07/20/2020    GFRAA >59 07/20/2020    LABGLOM >60 07/20/2020    GLUCOSE 275 07/20/2020    PROT 9.2 07/17/2020    CALCIUM 9.0 07/20/2020    BILITOT 0.3 07/17/2020    ALKPHOS 127 07/17/2020    AST 12 07/17/2020    ALT 14 07/17/2020       POC Tests:   Recent Labs     07/20/20  1108   POCGLU 160*       Coags: No results found for: PROTIME, INR, APTT    HCG (If Applicable): No results found for: PREGTESTUR, PREGSERUM, HCG, HCGQUANT     ABGs: No results found for: PHART, PO2ART, RSH8TNV, RJQ4YBW, BEART, N4LTFZIA     Type & Screen (If Applicable):  No results found for: LABABO, LABRH    Drug/Infectious Status (If Applicable):  No results found for: HIV, HEPCAB    COVID-19 Screening (If Applicable):   Lab Results   Component Value Date    COVID19 Not Detected 07/20/2020         Anesthesia Evaluation  Patient summary reviewed and Nursing notes reviewed no history of anesthetic complications:   Airway: Mallampati: IV  TM distance: >3 FB   Neck ROM: full  Mouth opening: < 3 FB Dental: normal exam         Pulmonary:Negative Pulmonary ROS and normal exam  breath sounds clear to auscultation  (+) current smoker    (-) shortness of breath          Patient did not smoke on day of surgery. Cardiovascular:    (+) hyperlipidemia    (-) hypertension, CAD,  angina and  CHF    NYHA Classification: I  ECG reviewed  Rhythm: regular  Rate: normal           Beta Blocker:  Not on Beta Blocker         Neuro/Psych:   Negative Neuro/Psych ROS  (+) psychiatric history:depression/anxiety    (-) seizures and CVA           GI/Hepatic/Renal: Neg GI/Hepatic/Renal ROS       (-) hiatal hernia and GERD       Endo/Other: Negative Endo/Other ROS   (+) Diabetes, . Pt had PAT visit. Abdominal:       Abdomen: soft.     Vascular:                                        Anesthesia Plan      regional, TIVA and MAC     ASA 3     (Extensive psyich hx , possible DI  Lat pop block )  Induction: intravenous. BIS  MIPS: Postoperative opioids intended and Prophylactic antiemetics administered. Anesthetic plan and risks discussed with patient. Use of blood products discussed with patient whom. Plan discussed with CRNA.     Attending anesthesiologist reviewed and agrees with Pre Eval content              Garth Kee MD   7/20/2020

## 2020-07-20 NOTE — PLAN OF CARE
Problem:  Activity:  Goal: Risk for activity intolerance will decrease  Description: Risk for activity intolerance will decrease  7/20/2020 0220 by Ghanshyam Cowan LPN  Outcome: Ongoing  7/19/2020 1842 by Rubin Hartmann LPN  Outcome: Ongoing  Goal: Amount of time patient spends in regular exercise will increase  Description: Amount of time patient spends in regular exercise will increase  7/20/2020 0220 by Ghanshyam Cowan LPN  Outcome: Ongoing  7/19/2020 1842 by Rubin Hartmann LPN  Outcome: Ongoing  Goal: Sleep-wake cycle will improve  Description: Sleep-wake cycle will improve  7/20/2020 0220 by Ghanshyam Cowan, LPN  Outcome: Ongoing  7/19/2020 1842 by Rubin Hartmann LPN  Outcome: Ongoing     Problem: Coping:  Goal: Ability to adjust to condition or change in health will improve  Description: Ability to adjust to condition or change in health will improve  7/20/2020 0220 by Ghanshyam Cowan LPN  Outcome: Ongoing  7/19/2020 1842 by Rubin Hartmann LPN  Outcome: Ongoing     Problem: Fluid Volume:  Goal: Ability to maintain a balanced intake and output will improve  Description: Ability to maintain a balanced intake and output will improve  7/20/2020 0220 by Ghanshyam Cowan LPN  Outcome: Ongoing  7/19/2020 1842 by Rubin Hartmann LPN  Outcome: Ongoing     Problem: Health Behavior:  Goal: Ability to identify and utilize available resources and services will improve  Description: Ability to identify and utilize available resources and services will improve  7/20/2020 0220 by Ghanshyam Cowan LPN  Outcome: Ongoing  7/19/2020 1842 by Rubin Hartmann LPN  Outcome: Ongoing  Goal: Ability to manage health-related needs will improve  Description: Ability to manage health-related needs will improve  7/20/2020 0220 by Ghanshyam Cowan LPN  Outcome: Ongoing  7/19/2020 1842 by Rubin Hartmann LPN  Outcome: Ongoing     Problem: Metabolic:  Goal: Ability to maintain appropriate glucose levels will improve  Description: Ability to maintain appropriate glucose levels will improve  7/20/2020 0220 by Ghanshyam [address], LPN  Outcome: Ongoing  7/19/2020 1842 by Bentley Waddell LPN  Outcome: Ongoing     Problem: Nutritional:  Goal: Maintenance of adequate nutrition will improve  Description: Maintenance of adequate nutrition will improve  7/20/2020 0220 by Ghanshyam Fa[address], LPN  Outcome: Ongoing  7/19/2020 1842 by Bentley Waddell LPN  Outcome: Ongoing  Goal: Progress toward achieving an optimal weight will improve  Description: Progress toward achieving an optimal weight will improve  7/20/2020 0220 by [address]anshy[address], LPN  Outcome: Ongoing  7/19/2020 1842 by Bentley Waddell LPN  Outcome: Ongoing  Goal: Nutritional status will improve  Description: Nutritional status will improve  7/20/2020 0220 by Ghanshyam [address], LPN  Outcome: Ongoing  7/19/2020 1842 by Bentley Waddell LPN  Outcome: Ongoing  Goal: Consumption of the prescribed amount of daily calories will improve  Description: Consumption of the prescribed amount of daily calories will improve  7/20/2020 0220 by Ghanshyam [address], LPN  Outcome: Ongoing  7/19/2020 1842 by Bentley Waddell LPN  Outcome: Ongoing  Goal: Ability to make healthy dietary choices will improve  Description: Ability to make healthy dietary choices will improve  7/20/2020 0220 by Ghanshyam [address], LPN  Outcome: Ongoing  7/19/2020 1842 by Bentley Waddell LPN  Outcome: Ongoing     Problem: Physical Regulation:  Goal: Complications related to the disease process, condition or treatment will be avoided or minimized  Description: Complications related to the disease process, condition or treatment will be avoided or minimized  7/20/2020 0220 by Ghanshyam [address], LPN  Outcome: Ongoing  7/19/2020 1842 by Bentley Waddell LPN  Outcome: Juliocesar Herring LPN  Outcome: Ongoing  Goal: Absence of physical injury  Description: Absence of physical injury  7/20/2020 0220 by [address], LPN  Outcome: Ongoing  7/19/2020 1842 by Brandon Wilcox LPN  Outcome: Ongoing     Problem: Safety:  Goal: Free from accidental physical injury  Description: Free from accidental physical injury  7/20/2020 0220 by [address], LPN  Outcome: Ongoing  7/19/2020 1842 by Brandon Wilcox LPN  Outcome: Ongoing  Goal: Free from intentional harm  Description: Free from intentional harm  7/20/2020 0220 by [address], LPN  Outcome: Ongoing  7/19/2020 1842 by Brandon Wilcox LPN  Outcome: Ongoing     Problem: Daily Care:  Goal: Daily care needs are met  Description: Daily care needs are met  7/20/2020 0220 by [address], LPN  Outcome: Ongoing  7/19/2020 1842 by Brandon Wilcox LPN  Outcome: Ongoing     Problem: Pain:  Goal: Patient's pain/discomfort is manageable  Description: Patient's pain/discomfort is manageable  7/20/2020 0220 by [address], LPN  Outcome: Ongoing  7/19/2020 1842 by Brandon Wilcox LPN  Outcome: Ongoing     Problem: Skin Integrity:  Goal: Skin integrity will stabilize  Description: Skin integrity will stabilize  7/20/2020 0220 by [address], LPN  Outcome: Ongoing  7/19/2020 1842 by Brandon Wilcox LPN  Outcome: Ongoing     Problem: Discharge Planning:  Goal: Patients continuum of care needs are met  Description: Patients continuum of care needs are met  7/20/2020 0220 by [address], LPN  Outcome: Ongoing  7/19/2020 1842 by Brandon Wilcox LPN  Outcome: Ongoing

## 2020-07-20 NOTE — CARE COORDINATION
7/20/20: Pt lives at [facility] in [facility]  Electronically signed by Kerri Nelson on 7/20/2020 at 4:57 PM

## 2020-07-21 LAB
ANION GAP SERPL CALCULATED.3IONS-SCNC: 11 MMOL/L (ref 7–19)
BUN BLDV-MCNC: 15 MG/DL (ref 6–20)
CALCIUM SERPL-MCNC: 9.1 MG/DL (ref 8.6–10)
CHLORIDE BLD-SCNC: 93 MMOL/L (ref 98–111)
CO2: 25 MMOL/L (ref 22–29)
CREAT SERPL-MCNC: 1.1 MG/DL (ref 0.5–1.2)
GFR AFRICAN AMERICAN: >59
GFR NON-AFRICAN AMERICAN: >60
GLUCOSE BLD-MCNC: 226 MG/DL (ref 70–99)
GLUCOSE BLD-MCNC: 262 MG/DL (ref 70–99)
GLUCOSE BLD-MCNC: 287 MG/DL (ref 70–99)
GLUCOSE BLD-MCNC: 302 MG/DL (ref 70–99)
GLUCOSE BLD-MCNC: 328 MG/DL (ref 74–109)
HCT VFR BLD CALC: 33.3 % (ref 42–52)
HEMOGLOBIN: 11.3 G/DL (ref 14–18)
MCH RBC QN AUTO: 30.5 PG (ref 27–31)
MCHC RBC AUTO-ENTMCNC: 33.9 G/DL (ref 33–37)
MCV RBC AUTO: 89.8 FL (ref 80–94)
PDW BLD-RTO: 12.4 % (ref 11.5–14.5)
PERFORMED ON: ABNORMAL
PLATELET # BLD: 327 K/UL (ref 130–400)
PMV BLD AUTO: 8.5 FL (ref 9.4–12.4)
POTASSIUM SERPL-SCNC: 4.2 MMOL/L (ref 3.5–5)
RBC # BLD: 3.71 M/UL (ref 4.7–6.1)
SODIUM BLD-SCNC: 129 MMOL/L (ref 136–145)
VANCOMYCIN TROUGH: 16.4 UG/ML (ref 10–20)
WBC # BLD: 10.9 K/UL (ref 4.8–10.8)

## 2020-07-21 PROCEDURE — 6360000002 HC RX W HCPCS: Performed by: SURGERY

## 2020-07-21 PROCEDURE — 6370000000 HC RX 637 (ALT 250 FOR IP): Performed by: NURSE PRACTITIONER

## 2020-07-21 PROCEDURE — 36415 COLL VENOUS BLD VENIPUNCTURE: CPT

## 2020-07-21 PROCEDURE — 2580000003 HC RX 258: Performed by: SURGERY

## 2020-07-21 PROCEDURE — 1210000000 HC MED SURG R&B

## 2020-07-21 PROCEDURE — 2580000003 HC RX 258: Performed by: HOSPITALIST

## 2020-07-21 PROCEDURE — 6370000000 HC RX 637 (ALT 250 FOR IP): Performed by: SURGERY

## 2020-07-21 PROCEDURE — 80202 ASSAY OF VANCOMYCIN: CPT

## 2020-07-21 PROCEDURE — 82947 ASSAY GLUCOSE BLOOD QUANT: CPT

## 2020-07-21 PROCEDURE — 85027 COMPLETE CBC AUTOMATED: CPT

## 2020-07-21 PROCEDURE — 80048 BASIC METABOLIC PNL TOTAL CA: CPT

## 2020-07-21 RX ORDER — SODIUM HYPOCHLORITE 1.25 MG/ML
SOLUTION TOPICAL 2 TIMES DAILY
Status: DISCONTINUED | OUTPATIENT
Start: 2020-07-21 | End: 2020-07-23 | Stop reason: HOSPADM

## 2020-07-21 RX ORDER — SODIUM CHLORIDE 9 MG/ML
INJECTION, SOLUTION INTRAVENOUS CONTINUOUS
Status: DISCONTINUED | OUTPATIENT
Start: 2020-07-21 | End: 2020-07-23 | Stop reason: HOSPADM

## 2020-07-21 RX ADMIN — DAKIN'S SOLUTION 0.125% (QUARTER STRENGTH): 0.12 SOLUTION at 22:12

## 2020-07-21 RX ADMIN — Medication 15 UNITS: at 08:29

## 2020-07-21 RX ADMIN — CEFEPIME 2 G: 2 INJECTION, POWDER, FOR SOLUTION INTRAMUSCULAR; INTRAVENOUS at 05:22

## 2020-07-21 RX ADMIN — Medication 15 UNITS: at 22:13

## 2020-07-21 RX ADMIN — SODIUM CHLORIDE: 9 INJECTION, SOLUTION INTRAVENOUS at 12:16

## 2020-07-21 RX ADMIN — TRAZODONE HYDROCHLORIDE 50 MG: 50 TABLET ORAL at 22:12

## 2020-07-21 RX ADMIN — COLLAGENASE SANTYL 1 EACH: 250 OINTMENT TOPICAL at 09:56

## 2020-07-21 RX ADMIN — INSULIN LISPRO 3 UNITS: 100 INJECTION, SOLUTION INTRAVENOUS; SUBCUTANEOUS at 08:29

## 2020-07-21 RX ADMIN — FAMOTIDINE 20 MG: 20 TABLET ORAL at 22:12

## 2020-07-21 RX ADMIN — INSULIN LISPRO 2 UNITS: 100 INJECTION, SOLUTION INTRAVENOUS; SUBCUTANEOUS at 22:14

## 2020-07-21 RX ADMIN — FLUOXETINE HYDROCHLORIDE 40 MG: 20 CAPSULE ORAL at 08:28

## 2020-07-21 RX ADMIN — VANCOMYCIN HYDROCHLORIDE 1750 MG: 10 INJECTION, POWDER, LYOPHILIZED, FOR SOLUTION INTRAVENOUS at 15:01

## 2020-07-21 RX ADMIN — COLLAGENASE SANTYL: 250 OINTMENT TOPICAL at 22:12

## 2020-07-21 RX ADMIN — CEFEPIME 2 G: 2 INJECTION, POWDER, FOR SOLUTION INTRAMUSCULAR; INTRAVENOUS at 17:08

## 2020-07-21 RX ADMIN — RISPERIDONE 4 MG: 1 TABLET ORAL at 22:12

## 2020-07-21 RX ADMIN — SODIUM CHLORIDE, PRESERVATIVE FREE 10 ML: 5 INJECTION INTRAVENOUS at 08:29

## 2020-07-21 RX ADMIN — FAMOTIDINE 20 MG: 20 TABLET ORAL at 08:28

## 2020-07-21 RX ADMIN — OLANZAPINE 10 MG: 10 TABLET, FILM COATED ORAL at 22:12

## 2020-07-21 RX ADMIN — DAKIN'S SOLUTION 0.125% (QUARTER STRENGTH): 0.12 SOLUTION at 12:15

## 2020-07-21 RX ADMIN — INSULIN LISPRO 2 UNITS: 100 INJECTION, SOLUTION INTRAVENOUS; SUBCUTANEOUS at 17:07

## 2020-07-21 RX ADMIN — INSULIN LISPRO 3 UNITS: 100 INJECTION, SOLUTION INTRAVENOUS; SUBCUTANEOUS at 12:15

## 2020-07-21 RX ADMIN — ATORVASTATIN CALCIUM 10 MG: 20 TABLET, FILM COATED ORAL at 08:28

## 2020-07-21 RX ADMIN — VANCOMYCIN HYDROCHLORIDE 1750 MG: 10 INJECTION, POWDER, LYOPHILIZED, FOR SOLUTION INTRAVENOUS at 01:39

## 2020-07-21 NOTE — PROGRESS NOTES
Vascular Surgery  Dr.Scott Heather Villa   Daily Progress Note    Pt Name: Sinai Barry Record Number: [id number]  Date of Birth 1965   Today's Date: 7/21/2020    SUBJECTIVE:     Patient was seen and examined. No complaints voiced, stating he is not having pain right now    OBJECTIVE:     Patient Vitals for the past 24 hrs:   BP Temp Temp src Pulse Resp SpO2 Height   07/21/20 0610 118/70 96.7 °F (35.9 °C) Temporal 64 14 97 % --   07/21/20 0352 128/74 97.7 °F (36.5 °C) Temporal 65 14 96 % --   07/21/20 0039 132/77 97.6 °F (36.4 °C) Temporal 76 16 95 % --   07/20/20 2232 -- -- -- 78 -- -- --   07/20/20 1914 113/67 97.9 °F (36.6 °C) Temporal 79 14 96 % --   07/20/20 1634 112/66 96.8 °F (36 °C) Temporal 69 16 93 % --   07/20/20 1610 123/69 -- -- 77 13 95 % --   07/20/20 1605 129/66 97.2 °F (36.2 °C) Temporal 79 13 93 % --   07/20/20 1600 122/68 -- -- 81 13 94 % --   07/20/20 1555 119/65 -- -- 90 19 94 % --   07/20/20 1550 123/63 -- -- 91 14 91 % --   07/20/20 1545 (!) 117/57 -- -- 90 14 94 % --   07/20/20 1541 -- 97.1 °F (36.2 °C) Temporal -- -- -- --   07/20/20 1540 118/70 -- -- 76 13 96 % --   07/20/20 1536 (!) 113/54 97.1 °F (36.2 °C) Temporal 78 14 97 % --   07/20/20 1106 113/72 97.4 °F (36.3 °C) Temporal 70 16 97 % --   07/20/20 1027 -- -- -- -- -- -- 6' 3\" (1.905 m)       Intake/Output Summary (Last 24 hours) at 7/21/2020 0924  Last data filed at 7/21/2020 0833  Gross per 24 hour   Intake 610 ml   Output 750 ml   Net -140 ml     In: 610 [I.V.:610]  Out: 750 [Urine:750]    I/O last 3 completed shifts:   In: 600 [I.V.:600]  Out: 300 [Urine:300]   Date 07/21/20 0000 - 07/21/20 2359   Shift 5358-8284 5057-4367 2138-0859 24 Hour Total   INTAKE   I.V.(mL/kg)  10(0.1)  10(0.1)   Shift Total(mL/kg)  10(0.1)  10(0.1)   OUTPUT   Urine(mL/kg/hr)  450  450   Shift Total(mL/kg)  450(4.6)  450(4.6)   Weight (kg) 96.9 96.9 96.9 96.9     Wt Readings from Last 3 Encounters:   07/20/20 213 lb 9.6 oz (96.9 kg)      Body mass index is 26.7 kg/m². Diet: DIET CARB CONTROL;      MEDS:     Scheduled Meds:   vancomycin  1,750 mg Intravenous Q12H    insulin glargine  15 Units Subcutaneous BID    cefepime  2 g Intravenous Q12H    FLUoxetine  40 mg Oral Daily    OLANZapine  10 mg Oral Nightly    risperiDONE  4 mg Oral Nightly    atorvastatin  10 mg Oral Daily    traZODone  50 mg Oral Nightly    sodium chloride flush  10 mL Intravenous 2 times per day    famotidine  20 mg Oral BID    vancomycin (VANCOCIN) intermittent dosing (placeholder)   Other RX Placeholder    insulin lispro  0-6 Units Subcutaneous TID WC    insulin lispro  0-3 Units Subcutaneous Nightly    nicotine  1 patch Transdermal Daily     Continuous Infusions:   dextrose       PRN Meds:hydrOXYzine, 25 mg, TID PRN  sodium chloride flush, 10 mL, PRN  potassium chloride, 40 mEq, PRN    Or  potassium alternative oral replacement, 40 mEq, PRN    Or  potassium chloride, 10 mEq, PRN  magnesium sulfate, 1 g, PRN  acetaminophen, 650 mg, Q6H PRN    Or  acetaminophen, 650 mg, Q6H PRN  polyethylene glycol, 17 g, Daily PRN  promethazine, 12.5 mg, Q6H PRN    Or  ondansetron, 4 mg, Q6H PRN  glucose, 15 g, PRN  dextrose, 12.5 g, PRN  glucagon (rDNA), 1 mg, PRN  dextrose, 100 mL/hr, PRN      PHYSICAL EXAM:     CONSTITUTIONAL: awake, cooperative, no apparent distress,   LUNGS: Chest expands equally bilaterally upon respiration, no accessory muscle used. Ausculation reveals no wheezes, rales or rhonchi. CARDIOVASCULAR: Heart regular rate and rhythm  ABDOMEN: soft, nontender, nondistended  NEUROLOGIC: Awake, oriented to name   WOUND/INCISION:  Left foot open wound beefy red color with bloody drainage noted, bone exposed  EXTREMITY:Feet warm to touch      Document Information     Wound    Left lateral forefoot wound photo . Wound Measures:  L= 6.9cm , W= 9.5cm, D= 6.0cm    07/21/2020 09:46    Attached To:     Hospital Encounter on 7/17/20    Source Information Latricia Cowden, RN  [facility]     Document Information     Wound    Left lateral forefoot photo    07/21/2020 09:47    Attached To: Hospital Encounter on 7/17/20    [address], RN  [facility]     LABS:     CBC:   Recent Labs     07/19/20  0153 07/20/20  0344 07/21/20  0338   WBC 10.0 9.5 10.9*   RBC 3.67* 3.91* 3.71*   HGB 11.2* 11.7* 11.3*   HCT 33.0* 35.1* 33.3*   MCV 89.9 89.8 89.8   MCH 30.5 29.9 30.5   MCHC 33.9 33.3 33.9   RDW 12.4 12.3 12.4    358 327   MPV 8.5* 8.5* 8.5*      Last 3 CMP:   Recent Labs     07/19/20  0153 07/20/20  0344 07/21/20  0338   * 131* 129*   K 4.1 4.6 4.2   CL 96* 94* 93*   CO2 26 27 25   BUN 8 10 15   CREATININE 0.6 0.7 1.1   GLUCOSE 171* 275* 328*   CALCIUM 9.0 9.0 9.1      Calcium:   Lab Results   Component Value Date    CALCIUM 9.1 07/21/2020    CALCIUM 9.0 07/20/2020    CALCIUM 9.0 07/19/2020      Lactic Acid:   Lab Results   Component Value Date    LACTA 1.2 07/17/2020        Gram Stain Result Abnormal    07/20/2020  2:04 PM  [facility]    Many WBC's (Polymorphonuclear)   Moderate Gram positive cocci  in pairs    Culture Surgical Abnormal    07/20/2020  2:04 PM  [facility]    Isolation in progress    Anaerobic Culture  07/20/2020  2:04 PM  [facility]    No anaerobes to date,will hold 4 days    Organism Abnormal    07/20/2020  2:04 PM  [facility]    Gram negative miguel    Culture Surgical  07/20/2020  2:04 PM  [facility]    Light growth   ID and sensitivity to follow        DVT prophylaxis:                                  [x] SCDs                                   ASSESSMENT:     Procedure 7/20/20 (s):  1. LEFT 4TH AND 5TH OPEN TOE AMPUTATION (METATARSAL LEVEL)  1.  HD # 4  Patient Active Problem List   Diagnosis    Osteomyelitis of ankle or foot, acute, left ([address]    [facility], [facility]    Bipolar disorder [facility]  Psychiatric disorder    Leukocytosis    Skin ulcers of foot, bilateral (HCC)    [facility] disease) [facility]    Tobacco abuse counseling    Cigarette nicotine dependence with nicotine-induced disorder    Schizophrenia [facility])       Chief Complaint:  Chief Complaint   Patient presents with    Wound Infection     arrived via EMS from [facility] with quan foot ulcers       PLAN:     1.  IV abx per ID, waiting on surgical sensitivities  2. Dakins moist to dry BID, new wound photo today. Will plan placement of VAC dressing to left lateral foot: Black [employer] Foam in open wound, VAC at -125mmHg cont, change on MWF  3. Forefoot offload shoe left foot. Will need to do pivot/transfers with limited walking. With pressure areas and wounds on right foot will be difficult to offload this foot also, will need to use wheelchair for main mobility. 4.   [facility]oke with [facility]) with  Johan Chavira. Patient will need to go to [address] [place] [address] Way- call to [facility] and they are asking us to fax info on patient to them.

## 2020-07-21 NOTE — CARE COORDINATION
ANNAMARIE and RNGerardo SÁNCHEZ with vascular surgery placed call to Marry Palmer at [facility] and Pt's needs re: d/c; expressed that Pt will likely need a wound vac or BID dressing changes if he returns to their facility; Marry Palmer noted that if he will need either one that he would likely need SNF first; he also stated he called [facility] to discuss Pt and availability right before we called him. ANNAMARIE discussed options with Pt; he is agreeable to referral to [facility]    Referral pending;     Ismael Dee does not have a guardian and makes his own decisions.      Electronically signed by KRISTAN Torres on 7/21/2020 at 11:05 AM

## 2020-07-21 NOTE — PROGRESS NOTES
Pharmacy Vancomycin Consult     Vancomycin Day: 5  Current Dosing: Vancomycin 1750 mg IV every 12 hours    Temp max:  97.9    Recent Labs     07/20/20  0344 07/21/20  0338   BUN 10 15       Recent Labs     07/20/20  0344 07/21/20  0338   CREATININE 0.7 1.1       Recent Labs     07/20/20  0344 07/21/20  0338   WBC 9.5 10.9*         Intake/Output Summary (Last 24 hours) at 7/21/2020 1448  Last data filed at 7/21/2020 1217  Gross per 24 hour   Intake 1110 ml   Output 1025 ml   Net 85 ml       Culture Date Source Results   07/17/20 Blood x 2 No growth   07/20/20 Surgical - tissue from toe Moderate Gram positive cocci  in pairs            Ht Readings from Last 1 Encounters:   07/20/20 6' 3\" (1.905 m)        Wt Readings from Last 1 Encounters:   07/20/20 213 lb 9.6 oz (96.9 kg)         Body mass index is 26.7 kg/m². Estimated Creatinine Clearance: 93 mL/min (based on SCr of 1.1 mg/dL). Trough: 16.4    Assessment/Plan: Level therapeutic. SrCr trending up today. Continue current dose and frequency; Vancomycin 1750 mg IV every 12 hours. Watch SrCr. Thank you for the consult. Will continue to follow.      Electronically signed by Sofy Bustos [facility] on 7/21/2020 at 2:48 PM

## 2020-07-22 LAB
ANION GAP SERPL CALCULATED.3IONS-SCNC: 13 MMOL/L (ref 7–19)
BLOOD CULTURE, ROUTINE: NORMAL
BUN BLDV-MCNC: 15 MG/DL (ref 6–20)
CALCIUM SERPL-MCNC: 9.3 MG/DL (ref 8.6–10)
CHLORIDE BLD-SCNC: 98 MMOL/L (ref 98–111)
CO2: 24 MMOL/L (ref 22–29)
CREAT SERPL-MCNC: 1 MG/DL (ref 0.5–1.2)
CULTURE, BLOOD 2: NORMAL
GFR AFRICAN AMERICAN: >59
GFR NON-AFRICAN AMERICAN: >60
GLUCOSE BLD-MCNC: 119 MG/DL (ref 70–99)
GLUCOSE BLD-MCNC: 161 MG/DL (ref 74–109)
GLUCOSE BLD-MCNC: 203 MG/DL (ref 70–99)
GLUCOSE BLD-MCNC: 277 MG/DL (ref 70–99)
GLUCOSE BLD-MCNC: 292 MG/DL (ref 70–99)
HCT VFR BLD CALC: 34.8 % (ref 42–52)
HEMOGLOBIN: 11.5 G/DL (ref 14–18)
MCH RBC QN AUTO: 30.2 PG (ref 27–31)
MCHC RBC AUTO-ENTMCNC: 33 G/DL (ref 33–37)
MCV RBC AUTO: 91.3 FL (ref 80–94)
PDW BLD-RTO: 12.4 % (ref 11.5–14.5)
PERFORMED ON: ABNORMAL
PLATELET # BLD: 320 K/UL (ref 130–400)
PMV BLD AUTO: 8.6 FL (ref 9.4–12.4)
POTASSIUM SERPL-SCNC: 4.7 MMOL/L (ref 3.5–5)
RBC # BLD: 3.81 M/UL (ref 4.7–6.1)
SODIUM BLD-SCNC: 135 MMOL/L (ref 136–145)
WBC # BLD: 10 K/UL (ref 4.8–10.8)

## 2020-07-22 PROCEDURE — 2580000003 HC RX 258: Performed by: SURGERY

## 2020-07-22 PROCEDURE — 36415 COLL VENOUS BLD VENIPUNCTURE: CPT

## 2020-07-22 PROCEDURE — 2580000003 HC RX 258: Performed by: HOSPITALIST

## 2020-07-22 PROCEDURE — 80048 BASIC METABOLIC PNL TOTAL CA: CPT

## 2020-07-22 PROCEDURE — 97530 THERAPEUTIC ACTIVITIES: CPT

## 2020-07-22 PROCEDURE — 6360000002 HC RX W HCPCS: Performed by: SURGERY

## 2020-07-22 PROCEDURE — 82947 ASSAY GLUCOSE BLOOD QUANT: CPT

## 2020-07-22 PROCEDURE — 97116 GAIT TRAINING THERAPY: CPT

## 2020-07-22 PROCEDURE — 6370000000 HC RX 637 (ALT 250 FOR IP): Performed by: SURGERY

## 2020-07-22 PROCEDURE — 1210000000 HC MED SURG R&B

## 2020-07-22 PROCEDURE — 97162 PT EVAL MOD COMPLEX 30 MIN: CPT

## 2020-07-22 PROCEDURE — 85027 COMPLETE CBC AUTOMATED: CPT

## 2020-07-22 RX ADMIN — OLANZAPINE 10 MG: 10 TABLET, FILM COATED ORAL at 23:12

## 2020-07-22 RX ADMIN — Medication 15 UNITS: at 08:27

## 2020-07-22 RX ADMIN — RISPERIDONE 4 MG: 1 TABLET ORAL at 23:12

## 2020-07-22 RX ADMIN — DAKIN'S SOLUTION 0.125% (QUARTER STRENGTH) 0.12 %: 0.12 SOLUTION at 08:24

## 2020-07-22 RX ADMIN — INSULIN LISPRO 2 UNITS: 100 INJECTION, SOLUTION INTRAVENOUS; SUBCUTANEOUS at 23:13

## 2020-07-22 RX ADMIN — CEFEPIME 2 G: 2 INJECTION, POWDER, FOR SOLUTION INTRAMUSCULAR; INTRAVENOUS at 17:56

## 2020-07-22 RX ADMIN — TRAZODONE HYDROCHLORIDE 50 MG: 50 TABLET ORAL at 23:12

## 2020-07-22 RX ADMIN — INSULIN LISPRO 3 UNITS: 100 INJECTION, SOLUTION INTRAVENOUS; SUBCUTANEOUS at 17:57

## 2020-07-22 RX ADMIN — COLLAGENASE SANTYL: 250 OINTMENT TOPICAL at 08:25

## 2020-07-22 RX ADMIN — INSULIN LISPRO 2 UNITS: 100 INJECTION, SOLUTION INTRAVENOUS; SUBCUTANEOUS at 12:26

## 2020-07-22 RX ADMIN — Medication 15 UNITS: at 23:11

## 2020-07-22 RX ADMIN — ATORVASTATIN CALCIUM 10 MG: 20 TABLET, FILM COATED ORAL at 08:24

## 2020-07-22 RX ADMIN — FLUOXETINE HYDROCHLORIDE 40 MG: 20 CAPSULE ORAL at 08:24

## 2020-07-22 RX ADMIN — FAMOTIDINE 20 MG: 20 TABLET ORAL at 08:24

## 2020-07-22 RX ADMIN — SODIUM CHLORIDE: 9 INJECTION, SOLUTION INTRAVENOUS at 02:27

## 2020-07-22 RX ADMIN — FAMOTIDINE 20 MG: 20 TABLET ORAL at 23:12

## 2020-07-22 RX ADMIN — VANCOMYCIN HYDROCHLORIDE 1750 MG: 1 INJECTION, POWDER, LYOPHILIZED, FOR SOLUTION INTRAVENOUS at 04:15

## 2020-07-22 RX ADMIN — VANCOMYCIN HYDROCHLORIDE 1750 MG: 1 INJECTION, POWDER, LYOPHILIZED, FOR SOLUTION INTRAVENOUS at 15:39

## 2020-07-22 RX ADMIN — CEFEPIME 2 G: 2 INJECTION, POWDER, FOR SOLUTION INTRAMUSCULAR; INTRAVENOUS at 06:19

## 2020-07-22 ASSESSMENT — PAIN SCALES - GENERAL: PAINLEVEL_OUTOF10: 0

## 2020-07-22 NOTE — PROGRESS NOTES
Hospitalist Progress Note  7/22/2020 5:00 PM  Subjective:   Admit Date: 7/17/2020  PCP: No primary care provider on file. Chief Complaint: wound infection    Subjective: Patient is without any new complaints. Accepted to SNF when he is cleared for discharge but awaiting recommendations for IV antibiotics. Surgical cultures remain pending. History is otherwise unchanged. [facility] Hospital History:   [copied from previous provider]  7/17/2020  Keith Martini an 50 y. o. male.  He is a very unfortunate male with chronic smoking, PVD and chronic medical issues who is a nursing home resident. [facility] was sent to the ER for evaluation due to bilateral foot ulcers for the last 5 months which are now getting worse.  Work-up was done in the ER including x-ray of the left foot which showed findings consistent with osteomyelitis.  He also has bilateral PVD with decreased palpations.  He was started on IV cefepime and vancomycin and is being admitted for medical management, wound care, vascular surgery and ID evaluation.    7/18/2020  Patient is receiving IV antibiotics. Blood sugar levels are elevated in 200s. I increase Lantus insulin to 15 units subcu twice daily. His hemoglobin A1c is 11%. We are awaiting consults by ID and vascular surgery. 7/19/2020  Patient still on IV antibiotics and followed up by ID and vascular surgery. Vascular surgery plans on taking him to the OR for amputation of his left fourth, fifth and possibly third toes. 7/20/2020  Patient is n.p.o. after midnight. He is going to go to surgery later on today for his left foot toe amputations. His list of psych medications received from [facility] and updated in the med rec sheet. 7/21/2020  Patient underwent amputation of left fourth and fifth toes yesterday. Postop he is doing well. Vascular surgery saw the patient and plan to put a wound VAC.   He would need placement in skilled nursing facility once cleared by vascular surgery. [end copied section]  7-22: Patient appears clinically stable but we are awaiting surgical cultures to guide IV antibiotics. He has been accepted to SNF for skilled nursing when he is ready for discharge. Will consider discharge on empiric antibiotics tomorrow if ID can make recommendations, with possibility of change when C&S is back. ROS: 14 point review of systems is negative except as specifically addressed above. DIET CARB CONTROL;     Intake/Output Summary (Last 24 hours) at 7/22/2020 1700  Last data filed at 7/22/2020 1623  Gross per 24 hour   Intake 3208 ml   Output 2800 ml   Net 408 ml     Medications:   sodium chloride 100 mL/hr at 07/22/20 2047    dextrose       Current Facility-Administered Medications   Medication Dose Route Frequency Provider Last Rate Last Dose    collagenase ointment   Topical BID [place]        0.9 % sodium chloride infusion   Intravenous Continuous Giovani Chaves  mL/hr at 07/22/20 0227      sodium hypochlorite (DAKINS) 0.125 % external solution   Irrigation BID Lonnell Pallas, MD   0.125 % at 07/22/20 0824    vancomycin (VANCOCIN) 1,750 mg in sodium chloride 0.9 % 500 mL IVPB  1,750 mg Intravenous Q12H Lonnell Pallas,  mL/hr at 07/22/20 1539 1,750 mg at 07/22/20 1539    insulin glargine (LANTUS) injection vial 15 Units  15 Units Subcutaneous BID Lonnell Pallas, MD   15 Units at 07/22/20 0827    ceFEPIme (MAXIPIME) 2 g in sterile water 20 mL IV syringe  2 g Intravenous Q12H Lonnell Pallas, MD   2 g at 07/22/20 8041    FLUoxetine (PROZAC) capsule 40 mg  40 mg Oral Daily Lonnell Pallas, MD   40 mg at 07/22/20 0747    hydrOXYzine (ATARAX) tablet 25 mg  25 mg Oral TID PRN Lonnell Pallas, MD   25 mg at 07/18/20 2028    OLANZapine (ZYPREXA) tablet 10 mg  10 mg Oral Nightly Lonnell Pallas, MD   10 mg at 07/21/20 2212    risperiDONE (RISPERDAL) tablet 4 mg  4 mg Oral Nightly Lonnell Pallas, MD   4 mg at 07/21/20 2212    atorvastatin (LIPITOR) tablet 10 mg  10 mg Oral Daily Carolina Thornton MD   10 mg at 07/22/20 3594    traZODone (DESYREL) tablet 50 mg  50 mg Oral Nightly Carolina Thornton MD   50 mg at 07/21/20 2212    sodium chloride flush 0.9 % injection 10 mL  10 mL Intravenous 2 times per day Carolina Thornton MD   10 mL at 07/21/20 0829    sodium chloride flush 0.9 % injection 10 mL  10 mL Intravenous PRN Carolina Thornton MD        potassium chloride (KLOR-CON M) extended release tablet 40 mEq  40 mEq Oral PRN Carolina Thornton MD        Or    potassium bicarb-citric acid (EFFER-K) effervescent tablet 40 mEq  40 mEq Oral PRN Carolina Thornton MD        Or   [employer] potassium chloride 10 mEq/100 mL IVPB (Peripheral Line)  10 mEq Intravenous PRN Carolina Thornton MD        magnesium sulfate 1 g in dextrose 5% 100 mL IVPB  1 g Intravenous PRN Carolina Thornton MD        acetaminophen (TYLENOL) tablet 650 mg  650 mg Oral Q6H PRN Carolina Thornton MD        Or   [employer] acetaminophen (TYLENOL) suppository 650 mg  650 mg Rectal Q6H PRN Carolina Thornton MD        polyethylene glycol [facility]) packet 17 g  17 g Oral Daily PRN Carolina Thornton MD        promethazine (PHENERGAN) tablet 12.5 mg  12.5 mg Oral Q6H PRN Carolina Thornton MD        Or    ondansetron [facility]) injection 4 mg  4 mg Intravenous Q6H PRN Carolina Thornton MD        famotidine (PEPCID) tablet 20 mg  20 mg Oral BID Carolina Thornton MD   20 mg at 07/22/20 5436    vancomycin (VANCOCIN) intermittent dosing (placeholder)   Other RX Damion Garcia MD        insulin lispro (HUMALOG) injection vial 0-6 Units  0-6 Units Subcutaneous TID WC Carolina Thornton MD   2 Units at 07/22/20 1226    insulin lispro (HUMALOG) injection vial 0-3 Units  0-3 Units Subcutaneous Nightly Carolina Thornton MD   2 Units at 07/21/20 2214    nicotine (NICODERM CQ) 21 MG/24HR 1 patch  1 patch Transdermal Daily Carolina Thornton MD   1 patch at 07/22/20 0827    glucose (GLUTOSE) 40 % oral gel 15 g  15 g Oral PRN Grey Flanagan MD        dextrose 50 % IV solution  12.5 g Intravenous PRN Grey Flanagan MD        glucagon (rDNA) injection 1 mg  1 mg Intramuscular PRN Grey Flanagan MD        dextrose 5 % solution  100 mL/hr Intravenous PRN Grey Flanagan MD            Labs:     Recent Labs     07/20/20 0344 07/21/20  0338 07/22/20  0142   WBC 9.5 10.9* 10.0   RBC 3.91* 3.71* 3.81*   HGB 11.7* 11.3* 11.5*   HCT 35.1* 33.3* 34.8*   MCV 89.8 89.8 91.3   MCH 29.9 30.5 30.2   MCHC 33.3 33.9 33.0    327 320     Recent Labs     07/20/20 0344 07/21/20  0338 07/22/20  0142   * 129* 135*   K 4.6 4.2 4.7   ANIONGAP 10 11 13   CL 94* 93* 98   CO2 27 25 24   BUN 10 15 15   CREATININE 0.7 1.1 1.0   GLUCOSE 275* 328* 161*   CALCIUM 9.0 9.1 9.3     No results for input(s): MG, PHOS in the last 72 hours. No results for input(s): AST, ALT, ALB, BILITOT, ALKPHOS, ALB in the last 72 hours. ABGs:No results for input(s): PH, PO2, PCO2, HCO3, BE, O2SAT in the last 72 hours. Troponin T: No results for input(s): TROPONINI in the last 72 hours. INR: No results for input(s): INR in the last 72 hours. Lactic Acid: No results for input(s): LACTA in the last 72 hours.     Objective:   Vitals: /72   Pulse 66   Temp 97.3 °F (36.3 °C) (Temporal)   Resp 12   Ht 6' 3\" (1.905 m)   Wt 213 lb 9.6 oz (96.9 kg)   SpO2 95%   BMI 26.70 kg/m²   24HR INTAKE/OUTPUT:      Intake/Output Summary (Last 24 hours) at 7/22/2020 1700  Last data filed at 7/22/2020 1623  Gross per 24 hour   Intake 3208 ml   Output 2800 ml   Net 408 ml     General appearance: alert and cooperative with exam  HEENT: atraumatic, eyes with clear conjunctiva and normal lids, pupils and irises normal, external ears and nose are normal, lips normal  Neck without masses, lympadenopathy, bruit, thyroid normal  Lungs: no increased work of breathing, diminished breath sounds bilaterally  Heart: regular rate and rhythm, S1, S2 normal, no murmur, click, rub or gallop  Abdomen: soft, non-tender; bowel sounds normal; no masses,  no organomegaly  Extremities: bilateral feel dressed, dressings intact, localized edema, modified Aurora's negative  Neurologic: no focal neurologic deficits, normal sensation, alert and oriented, affect and mood appropriate  Skin: no rashes, nodules    Assessment and Plan:   Principal Problem:    Osteomyelitis of ankle or foot, acute, left (HCC)  Active Problems:    Major depression, recurrent, chronic ([facility])    Bipolar disorder ([facility]    Psychiatric disorder    Leukocytosis    Skin ulcers of foot, bilateral ([facility])    PVD (peripheral vascular disease) ([facility]    Tobacco abuse counseling    Cigarette nicotine dependence with nicotine-induced disorder    Schizophrenia ([facility]  Resolved Problems:    * No resolved hospital problems. *    Day #6 cefepime and vancomycin empiric therapy for osteomyelitis and infected ulcers of feet. Surgical culture and susceptibilities pending. ID onboard, appreciate assistance. If C&S not back tomorrow, could discharge to SNF on empiric regimen. Pending SNF placement.     Advance Directive: Full Code    DVT prophylaxis: SCDs    Discharge planning: to SNF      DO [place] Hospitalist

## 2020-07-22 NOTE — PROGRESS NOTES
[facility], [place] [address]        DEPARTMENT OF HOSPITALIST MEDICINE        PROGRESS  NOTE:    NOTE: Portions of this note have been copied forward, however, changed to reflect the most current clinical status of this patient. Patient:  Jasmyn Martinez  YOB: 1965  Date of Service: 7/21/2020  MRN: [id number]   Acct: [de-identified]   Primary Care Physician: No primary care provider on file. Advance Directive: Full Code  Admit Date: 7/17/2020       Hospital Day: 4      CHIEF COMPLAINT:  Chief Complaint   Patient presents with    Wound Infection     arrived via EMS from [facility] with quan foot ulcers       SUBJECTIVE / [address]:    7/21/2020  Patient underwent amputation of left fourth and fifth toes yesterday. Postop he is doing well. Vascular surgery saw the patient and plan to put a wound VAC. He would need placement in skilled nursing facility once cleared by vascular surgery. 7/20/2020  Patient is n.p.o. after midnight. He is going to go to surgery later on today for his left foot toe amputations. His list of psych medications received from [facility] and updated in the med rec sheet. 7/19/2020  Patient still on IV antibiotics and followed up by ID and vascular surgery. Vascular surgery plans on taking him to the OR for amputation of his left fourth, fifth and possibly third toes. 7/18/2020  Patient is receiving IV antibiotics. Blood sugar levels are elevated in 200s. I increase Lantus insulin to 15 units subcu twice daily. His hemoglobin A1c is 11%. We are awaiting consults by ID and vascular surgery. 7/17/2020  HISTORY OF PRESENT ILLNESS:  Iman Dove is an 50 y.o. male. He is a very unfortunate male with chronic smoking, PVD and chronic medical issues who is a nursing home resident. He was sent to the ER for evaluation due to bilateral foot ulcers for the last 5 months which are now getting worse.   Work-up was done in the ER including x-ray of the left foot which showed findings consistent with osteomyelitis. He also has bilateral PVD with decreased palpations. He was started on IV cefepime and vancomycin and is being admitted for medical management, wound care, vascular surgery and ID evaluation. REVIEW  OF  SYSTEMS:    Constitutional:  No fevers, chills, nausea, vomiting,    Lungs:   No hemoptysis, pleurisy   Heart:  No chest pressure with exertion, palpitations,    Abdomen:   No new masses, no bright red blood per rectum   Extremities: ++pain while ambulating due to bilateral foot ulcers   Neurologic:  Bilateral mild numbness of feet       PAST MEDICAL HISTORY:  Past Medical History:   Diagnosis Date    Anxiety     Diabetes [facility]     Schizophrenic disorder ([facility]          PAST SURGICAL HISTORY:  Past Surgical History:   Procedure Laterality Date    TOE AMPUTATION Left 7/20/2020    4TH AND 5TH OPEN TOE AMPUTATION performed by Zoraida Burgess MD at [address]:  History reviewed. No pertinent family history. OBJECTIVE:  /77   Pulse 84   Temp 97.9 °F (36.6 °C) (Temporal)   Resp 14   Ht 6' 3\" (1.905 m)   Wt 213 lb 9.6 oz (96.9 kg)   SpO2 96%   BMI 26.70 kg/m²   I/O this shift:  In: 1609.5 [P.O.:520;  I.V.:1089.5]  Out: 1250 [Urine:1250]    PHYSICAL  EXAMINATION:    NAZANIN:  Awake, alert, oriented x 3, patient appears tired and fatigued   Head/Eyes:  Normocephalic, atraumatic, EOMI and PERRLA bilaterally   Respiratory:   Bilateral decreased air entry in both lung fields, mild B/L crackles, symmetric expansion of chest   Cardiovascular:  Regular rate and rhythm, S1+S2+0, no murmurs/rubs   Abdomen:   Soft, non-tender, bowel sounds +ve, no organomegaly   Extremities: Moves all, decreased range of motion, patient has bandages over both feet on evaluation today   Neurologic: Awake, alert, oriented x 3, cranial nerves II-XII intact, no focal neurological deficits, sensory system intact   Psychiatric: Normal mood, non-suicidal, flat affect       CURRENT MEDICATIONS:  Scheduled:   collagenase   Topical BID    sodium hypochlorite   Irrigation BID    [START ON 7/22/2020] vancomycin  1,750 mg Intravenous Q12H    insulin glargine  15 Units Subcutaneous BID    cefepime  2 g Intravenous Q12H    FLUoxetine  40 mg Oral Daily    OLANZapine  10 mg Oral Nightly    risperiDONE  4 mg Oral Nightly    atorvastatin  10 mg Oral Daily    traZODone  50 mg Oral Nightly    sodium chloride flush  10 mL Intravenous 2 times per day    famotidine  20 mg Oral BID    vancomycin (VANCOCIN) intermittent dosing (placeholder)   Other RX Placeholder    insulin lispro  0-6 Units Subcutaneous TID WC    insulin lispro  0-3 Units Subcutaneous Nightly    nicotine  1 patch Transdermal Daily        PRN:  hydrOXYzine, 25 mg, TID PRN  sodium chloride flush, 10 mL, PRN  potassium chloride, 40 mEq, PRN    Or  potassium alternative oral replacement, 40 mEq, PRN    Or  potassium chloride, 10 mEq, PRN  magnesium sulfate, 1 g, PRN  acetaminophen, 650 mg, Q6H PRN    Or  acetaminophen, 650 mg, Q6H PRN  polyethylene glycol, 17 g, Daily PRN  promethazine, 12.5 mg, Q6H PRN    Or  ondansetron, 4 mg, Q6H PRN  glucose, 15 g, PRN  dextrose, 12.5 g, PRN  glucagon (rDNA), 1 mg, PRN  dextrose, 100 mL/hr, PRN        Infusions:   sodium chloride 100 mL/hr at 07/21/20 1216    dextrose         Laboratory Data:  Recent Labs     07/19/20  0153 07/20/20  0344 07/21/20  0338   WBC 10.0 9.5 10.9*   HGB 11.2* 11.7* 11.3*    358 327     Recent Labs     07/19/20  0153 07/20/20  0344 07/21/20  0338   * 131* 129*   K 4.1 4.6 4.2   CL 96* 94* 93*   CO2 26 27 25   BUN 8 10 15   CREATININE 0.6 0.7 1.1   GLUCOSE 171* 275* 328*     No results for input(s): AST, ALT, ALB, BILITOT, ALKPHOS in the last 72 hours. Troponin T: No results for input(s): TROPONINI in the last 72 hours. Pro-BNP: No results for input(s): BNP in the last 72 hours.   INR: No results for input(s): INR in the last 72 hours. UA:  No results for input(s): NITRITE, COLORU, PHUR, LABCAST, WBCUA, RBCUA, MUCUS, TRICHOMONAS, YEAST, BACTERIA, CLARITYU, SPECGRAV, LEUKOCYTESUR, UROBILINOGEN, BILIRUBINUR, BLOODU, GLUCOSEU, AMORPHOUS in the last 72 hours. Invalid input(s): [place]  A1C:   No results for input(s): LABA1C in the last 72 hours. ABG:No results for input(s): PHART, PAQ6ZHH, PO2ART, ZVH0JYM, BEART, HGBAE, K0RTPVXE, CARBOXHGBART in the last 72 hours. Imaging:          Xr Foot Left (min 3 Views)    Result Date: 7/17/2020  1. Findings are highly suggestive of acute osteomyelitis in the 4th and 5th metatarsal heads. Involvement of the base of the 4th proximal phalanx is also suspected. 2. Chronic osteomyelitis of the 1st metatarsal head and great toe and the 3rd metatarsal head. 3. Soft tissue air in the plantar aspect of the 4th MTP joint. Signed by Dr Marylou Chance on 7/17/2020 2:20 PM    Xr Foot Right (min 3 Views)    Result Date: 7/17/2020  1. No evidence of acute bony abnormality in the right foot. While there is no radiographic evidence of osteomyelitis, the findings of acute osteomyelitis do not appear on radiographs for 10 to 14 days. MRI would be more sensitive and specific for diagnosis of acute osteomyelitis. If the patient is not a candidate for MRI, nuclear medicine three-phase bone scan would be recommended. Signed by Dr Marylou Chance on 7/17/2020 2:22 PM       ASSESSMENT & PLAN:    Principal Problem:    Osteomyelitis of ankle or foot, acute, left (HCC)  Active Problems:    Major depression, recurrent, chronic (HCC)    Bipolar disorder ([address]    Psychiatric disorder    Leukocytosis    Skin ulcers of foot, bilateral (HCC)    PVD (peripheral vascular disease) ([address]    Tobacco abuse counseling    Cigarette nicotine dependence with nicotine-induced disorder    Schizophrenia ([address]  Resolved Problems:    * No resolved hospital problems.  *    Brief Postoperative Note        Patient: Johnson Sheamilka MIGUEL Edmond  YOB: 1965  MRN: [id number]     Date of Procedure: 7/20/2020     Pre-Op Diagnosis: M86.172     Post-Op Diagnosis: Same       Procedure(s):  1. LEFT 4TH AND 5TH OPEN TOE AMPUTATION (METATARSAL LEVEL)     Surgeon(s):  Deanna Gordon MD    Continue with IV antibiotic in the form of cefepime and vancomycin  Continue with her psychiatric medications which are updated after receiving confirmed medication sheet from [id number] with wound care and dressing changes  Vascular surgery and ID consultations reviewed and appreciated  Increased Lantus insulin to 15 units subcu twice daily for better control of diabetes mellitus  Patient is poorly controlled diabetes mellitus as evidenced by hemoglobin A1c of 11%  Blood sugar levels under better control after increasing Lantus Insulin yesterday  Patient has mildly diminished blood flow to bilateral arterial vasculature in both legs on lower leg arterial duplex results  Patient is going for surgery today for amputation of his toes on left foot  Cut down on IV hydration with normal saline to 75 cc/h  Continue close follow-up with vascular surgery  Patient may be undergoing wound VAC placement today  Case management consult ordered for DC planning      Repeat labs in a.m. Electrolyte replacement as per protocol. Patient will be monitored very closely on the floor. Further recommendations as per the hospital course. The patient's management will be taken over by my covering Hospitalist, Dr. King Chirinos, in a.m . .. I am signing off!         Discharge Plan: DC planning to skilled nursing facility once patient is cleared by vascular surgery      Patient  is on DVT prophylaxis  Current medications reviewed  Lab work reviewed  Radiology/Chest x-ray films reviewed  Treatment recommendations from suspecialities reviewed, appreciated and agreed with  Discussed with the nurse and addressed all questions/concerns  Discussed with Patient and/or Family at the bedside in detail . .. they understand and agree with the management plan. Alejandra Uriarte MD  7/21/2020 9:36 PM      DISCLAIMER: This note was created with electronic voice recognition which does have occasional errors. If you have any questions regarding the content within the note please do not hesitate to contact me. .. Thanks.

## 2020-07-22 NOTE — PROGRESS NOTES
Vascular Surgery  Dr.Scott Pari Katz   Daily Progress Note    Pt Name: Sinai Barry Record Number: [id number]  Date of Birth 1965   Today's Date: 7/22/2020    SUBJECTIVE:     Patient was seen and examined. No complaints voiced, asking for more coffee  Stating he has no pain at present time    OBJECTIVE:     Patient Vitals for the past 24 hrs:   BP Temp Temp src Pulse Resp SpO2   07/22/20 0630 119/72 97.3 °F (36.3 °C) Temporal 66 12 95 %   07/22/20 0019 125/78 97.6 °F (36.4 °C) Temporal 75 14 97 %   07/21/20 1803 125/77 97.9 °F (36.6 °C) Temporal 84 14 96 %       Intake/Output Summary (Last 24 hours) at 7/22/2020 0903  Last data filed at 7/22/2020 5130  Gross per 24 hour   Intake 3637.51 ml   Output 3175 ml   Net 462.51 ml     In: 3637.5 [P.O.:520; I.V.:3117.5]  Out: 2900 [Urine:2900]    I/O last 3 completed shifts: In: 3887.5 [P.O.:760; I.V.:3127.5]  Out: 2699 [Urine:3625]   Date 07/22/20 0000 - 07/22/20 2359   Shift 7364-7152 8440-0773 8688-9285 24 Hour Total   INTAKE   I.V.(mL/kg) 7758(30.7)   7525(36.6)   Shift Total(mL/kg) 0859(67.2)   1723(61.6)   OUTPUT   Urine(mL/kg/hr) 1650(2.1)   1650   Shift Total(mL/kg) 4368(78)   1650(17)   Weight (kg) 96.9 96.9 96.9 96.9     Wt Readings from Last 3 Encounters:   07/20/20 213 lb 9.6 oz (96.9 kg)      Body mass index is 26.7 kg/m².      Diet: DIET CARB CONTROL;      MEDS:     Scheduled Meds:   collagenase   Topical BID    sodium hypochlorite   Irrigation BID    vancomycin  1,750 mg Intravenous Q12H    insulin glargine  15 Units Subcutaneous BID    cefepime  2 g Intravenous Q12H    FLUoxetine  40 mg Oral Daily    OLANZapine  10 mg Oral Nightly    risperiDONE  4 mg Oral Nightly    atorvastatin  10 mg Oral Daily    traZODone  50 mg Oral Nightly    sodium chloride flush  10 mL Intravenous 2 times per day    famotidine  20 mg Oral BID    vancomycin (VANCOCIN) intermittent dosing (placeholder)   Other RX Placeholder    insulin lispro  0-6 Units Subcutaneous TID WC    insulin lispro  0-3 Units Subcutaneous Nightly    nicotine  1 patch Transdermal Daily     Continuous Infusions:   sodium chloride 100 mL/hr at 07/22/20 0227    dextrose       PRN Meds:hydrOXYzine, 25 mg, TID PRN  sodium chloride flush, 10 mL, PRN  potassium chloride, 40 mEq, PRN    Or  potassium alternative oral replacement, 40 mEq, PRN    Or  potassium chloride, 10 mEq, PRN  magnesium sulfate, 1 g, PRN  acetaminophen, 650 mg, Q6H PRN    Or  acetaminophen, 650 mg, Q6H PRN  polyethylene glycol, 17 g, Daily PRN  promethazine, 12.5 mg, Q6H PRN    Or  ondansetron, 4 mg, Q6H PRN  glucose, 15 g, PRN  dextrose, 12.5 g, PRN  glucagon (rDNA), 1 mg, PRN  dextrose, 100 mL/hr, PRN      PHYSICAL EXAM:     CONSTITUTIONAL: awake, cooperative, no apparent distress,   LUNGS: Chest expands equally bilaterally upon respiration, no accessory muscle used. Ausculation reveals no wheezes, rales or rhonchi. CARDIOVASCULAR: Heart regular rate and rhythm  ABDOMEN: soft, nontender, nondistended  NEUROLOGIC: Awake, oriented to name, does not make eye contact with conversation   WOUND/INCISION:  Left foot dressing is intact, right foot dressing intact  EXTREMITY:Feet warm to touch    Document Information     Wound    Left lateral foot photo    07/22/2020 12:58    Attached To: Hospital Encounter on 7/17/20    Source [address], RN  [place]      Document Information     Wound    Right plantar forefoot photo    07/22/2020 13:09    Attached To:     Hospital Encounter on 7/17/20    [address], RN  [facility]     LABS:     CBC:   Recent Labs     07/20/20  0344 07/21/20  0338 07/22/20  0142   WBC 9.5 10.9* 10.0   RBC 3.91* 3.71* 3.81*   HGB 11.7* 11.3* 11.5*   HCT 35.1* 33.3* 34.8*   MCV 89.8 89.8 91.3   MCH 29.9 30.5 30.2   MCHC 33.3 33.9 33.0   RDW 12.3 12.4 12.4    327 320   MPV 8.5* 8.5* 8.6*      Last 3 CMP:   Recent Labs     07/20/20  0344 07/21/20  0796 07/22/20  0142   * 129* 135*   K 4.6 4.2 4.7   CL 94* 93* 98   CO2 27 25 24   BUN 10 15 15   CREATININE 0.7 1.1 1.0   GLUCOSE 275* 328* 161*   CALCIUM 9.0 9.1 9.3      Calcium:   Lab Results   Component Value Date    CALCIUM 9.3 07/22/2020    CALCIUM 9.1 07/21/2020    CALCIUM 9.0 07/20/2020      Lactic Acid:   Lab Results   Component Value Date    LACTA 1.2 07/17/2020        Gram Stain Result Abnormal    07/20/2020  2:04 PM  [facility]    Many WBC's (Polymorphonuclear)   Moderate Gram positive cocci  in pairs    Culture Surgical Abnormal    07/20/2020  2:04 PM  [facility]    Isolation in progress    Anaerobic Culture  07/20/2020  2:04 PM  [facility]    No anaerobes to date,will hold 4 days    Organism Abnormal    07/20/2020  2:04 PM  [facility]    Gram negative miguel    Culture Surgical  07/20/2020  2:04 PM  [facility]    Light growth   ID and sensitivity to follow        DVT prophylaxis:                                  [x] SCDs                                   ASSESSMENT:     Procedure 7/20/20 (s):  1. LEFT 4TH AND 5TH OPEN TOE AMPUTATION (METATARSAL LEVEL)  1. HD # 5  Patient Active Problem List   Diagnosis    Osteomyelitis of ankle or foot, acute, left (HCC)    Major depression, recurrent, chronic (HCC)    [facility]    Psychiatric disorder    Leukocytosis    Skin ulcers of foot, bilateral ([facility]    PVD (peripheral vascular disease) ([facility]    Tobacco abuse counseling    Cigarette nicotine dependence with nicotine-induced disorder    Schizophrenia [facility])       Chief Complaint:  Chief Complaint   Patient presents with    Wound Infection     arrived via EMS from [facility] with quan foot ulcers       PLAN:     1.  IV abx per ID, still waiting on surgical sensitivities  2. Dakins moist to dry BID left foot wound. Will plan placement of VAC dressing to left lateral foot today unless patient has SNF bed offer. Promogran over bone in wound bed, Black [employer] Foam in open wound, VAC at -125mmHg cont, change on MWF  3.   PT to evaluate for safety with offload shoe. Forefoot offload shoe left foot. Will need to do pivot/transfers with limited walking. With pressure areas and wounds on right foot will be difficult to offload this foot also, will need to use wheelchair for main mobility. 4.   Spoke with [facility] in [facility]) with  Mark Counts. Patient will need to go to [facility]. Information faxed to [employer] yesterday.

## 2020-07-22 NOTE — PROGRESS NOTES
Physical Therapy    Facility/Department: [facility] MONIQUE/VAS/MED  Initial Assessment    NAME: Jourdan Edmond  : 1965  MRN: [id number]    Date of Service: 2020    Discharge Recommendations:  Continue to assess pending progress, Patient would benefit from continued therapy after discharge   PT Equipment Recommendations  Other: ASSESSING    Assessment   Body structures, Functions, Activity limitations: Decreased functional mobility ; Decreased posture;Decreased strength  Assessment: pt WOULD BENEFIT FROM SKILLED PT SERVICES TO ADDRESS HIS MOBILITY DEFICITS RELATED TO RECENT L FOOT AMPUTATION  Prognosis: Good  Decision Making: Medium Complexity  PT Education: Transfer Training;Weight-bearing Education;Precautions; Equipment;Gait Training;General Safety  Patient Education: USE OF OFFLOAD SHOE  REQUIRES PT FOLLOW UP: Yes  Activity Tolerance  Activity Tolerance: Patient Tolerated treatment well       Patient Diagnosis(es): The primary encounter diagnosis was Other acute osteomyelitis of left foot ([address] Diagnoses of Skin ulcer of left foot with necrosis of bone ([address] Skin ulcer of right foot, limited to breakdown of skin ([address] Type 2 diabetes mellitus with other specified complication, unspecified whether long term insulin use ([address] and PVD (peripheral vascular disease) ([address] were also pertinent to this visit. has a past medical history of Anxiety, Diabetes ([address] and Schizophrenic disorder ([address] has a past surgical history that includes Toe amputation (Left, 2020). Restrictions  Restrictions/Precautions  Restrictions/Precautions: Weight Bearing  Lower Extremity Weight Bearing Restrictions  Partial Weight Bearing Percentage Or Pounds: CAN ONLY WB ON L LE WITH OFFLOAD SHOE IN PLACE.  \"WILL NEED TO DO PIVOT TF'S WITH LIMITED WALKING\" PER LAUREN RUTHERFORD  Vision/Hearing        Subjective  General  Diagnosis: 4TH AND 5TH TOE AMP (METATARSAL LEVEL) 20, WOUND ON R FOOT  General Comment  Comments: HX OF SCHIZOPHRENIA  Subjective  Subjective: pt DENIES PAIN AND AGREES TO WORK WITH PT. STATES UNDERSTANDING OF WEAR AND USE OF OFFLOAD SHOE  Pain Screening  Patient Currently in Pain: Denies          Orientation     Social/Functional History  Social/Functional History  Additional Comments: pt STATES HE WAS NOT USING A RW FOR AMB PRIOR TO SX  Cognition        Objective          AROM RLE (degrees)  RLE AROM: WFL  AROM LLE (degrees)  LLE AROM : WFL  Strength Other  Other: ANTIGRAVITY        Bed mobility  Supine to Sit: Contact guard assistance;Stand by assistance  Transfers  Sit to Stand: Contact guard assistance;Minimal Assistance  Stand to sit: Contact guard assistance  Bed to Chair: Contact guard assistance  Ambulation  Ambulation?: Yes  Ambulation 1  Surface: level tile  Device: Rolling Walker  Other Apparatus: (OFFLOAD SHOE L LE)  Assistance: Contact guard assistance  Quality of Gait: FLEXED POSTURE, SLOW, ABLE TO USE OFFLOAD SHOE PROPERLY  Gait Deviations: Slow Yoko;Decreased step length;Shuffles;Decreased step height  Distance: 5 FT  Comments: WOULD DO WELL TO HAVE A SHOE ON R FOOT IF ABLE IN ORDER TO IMPROVE BALANCE     Balance  Posture: Fair  Comments: ABLE TO STAND FOR CLEANING WITHOUT LOB.         Plan   Plan  Times per week: 5-7  Plan weeks: 2  Current Treatment Recommendations: Safety Education & Training, Functional Mobility Training, Transfer Training, Gait Training, Positioning, Pain Management  Plan Comment: OFFLOAD SHOE, TF'S AND AND SHORT DISTANCE AMBULATION IN ROOM  Safety Devices  Type of devices: Call light within reach, Gait belt, Left in chair(RN [address])    G-Code       OutComes Score                                                  AM-PAC Score             Goals  Short term goals  Time Frame for Short term goals: 2 WKS  Short term goal 1: SUP<>SIT, IND  Short term goal 2: SIT<>STAND, SBA  Short term goal 3: AMB 15 FT WITH RW AND CGA/SBA       Therapy Time Individual Concurrent Group Co-treatment   Time In           Time Out           Minutes                   Kaley Merrill PT    Electronically signed by Kaley Merrill PT on 7/22/2020 at 4:01 PM

## 2020-07-22 NOTE — PROGRESS NOTES
Infectious Diseases Progress Note    Patient:  Chloe Rasmussen  YOB: 1965  MRN: [id number]   Admit date: 7/17/2020   Admitting Physician: Maria Del Rosario Mahoney MD  Primary Care Physician: No primary care provider on file. Chief Complaint/Interval History: New symptoms. Comfortable at present. Had surgery yesterday. Operative note reviewed. He underwent left fourth and fifth open toe amputation. In/Out    Intake/Output Summary (Last 24 hours) at 7/21/2020 2319  Last data filed at 7/21/2020 2102  Gross per 24 hour   Intake 2119.51 ml   Output 1975 ml   Net 144.51 ml     Allergies:    Allergies   Allergen Reactions    Clozaril [Clozapine]     Haldol [Haloperidol]     Lithium     Navane [Thiothixene]      Current Meds: collagenase ointment, BID  0.9 % sodium chloride infusion, Continuous  sodium hypochlorite (DAKINS) 0.125 % external solution, BID  [START ON 7/22/2020] vancomycin (VANCOCIN) 1,750 mg in sodium chloride 0.9 % 500 mL IVPB, Q12H  insulin glargine (LANTUS) injection vial 15 Units, BID  ceFEPIme (MAXIPIME) 2 g in sterile water 20 mL IV syringe, Q12H  FLUoxetine (PROZAC) capsule 40 mg, Daily  hydrOXYzine (ATARAX) tablet 25 mg, TID PRN  OLANZapine (ZYPREXA) tablet 10 mg, Nightly  risperiDONE (RISPERDAL) tablet 4 mg, Nightly  atorvastatin (LIPITOR) tablet 10 mg, Daily  traZODone (DESYREL) tablet 50 mg, Nightly  sodium chloride flush 0.9 % injection 10 mL, 2 times per day  sodium chloride flush 0.9 % injection 10 mL, PRN  potassium chloride (KLOR-CON M) extended release tablet 40 mEq, PRN    Or  potassium bicarb-citric acid (EFFER-K) effervescent tablet 40 mEq, PRN    Or  potassium chloride 10 mEq/100 mL IVPB (Peripheral Line), PRN  magnesium sulfate 1 g in dextrose 5% 100 mL IVPB, PRN  acetaminophen (TYLENOL) tablet 650 mg, Q6H PRN    Or  acetaminophen (TYLENOL) suppository 650 mg, Q6H PRN  polyethylene glycol (GLYCOLAX) packet 17 g, Daily PRN  promethazine (PHENERGAN) tablet 12.5 mg, Q6H PRN Or  ondansetron (ZOFRAN) injection 4 mg, Q6H PRN  famotidine (PEPCID) tablet 20 mg, BID  vancomycin (VANCOCIN) intermittent dosing (placeholder), RX Placeholder  insulin lispro (HUMALOG) injection vial 0-6 Units, TID WC  insulin lispro (HUMALOG) injection vial 0-3 Units, Nightly  nicotine (NICODERM CQ) 21 MG/24HR 1 patch, Daily  glucose (GLUTOSE) 40 % oral gel 15 g, PRN  dextrose 50 % IV solution, PRN  glucagon (rDNA) injection 1 mg, PRN  dextrose 5 % solution, PRN      Review of Systems no nausea or diarrhea. No skin rash. VitalSigns:  /77   Pulse 84   Temp 97.9 °F (36.6 °C) (Temporal)   Resp 14   Ht 6' 3\" (1.905 m)   Wt 213 lb 9.6 oz (96.9 kg)   SpO2 96%   BMI 26.70 kg/m²      Physical Exam  Dressing clean/dry. Lungs without crackles  General looks comfortable    Lab Results:  CBC:   Recent Labs     07/19/20  0153 07/20/20  0344 07/21/20  0338   WBC 10.0 9.5 10.9*   HGB 11.2* 11.7* 11.3*    358 327     BMP:  Recent Labs     07/19/20  0153 07/20/20  0344 07/21/20  0338   * 131* 129*   K 4.1 4.6 4.2   CL 96* 94* 93*   CO2 26 27 25   BUN 8 10 15   CREATININE 0.6 0.7 1.1   GLUCOSE 171* 275* 328*     CultureResults:  No results for input(s): BC, BLOODCULT2 in the last 72 hours.   Recent Labs     07/20/20  1404   LABGRAM Many WBC's (Polymorphonuclear)  Moderate Gram positive cocci  in pairs  *     Recent Labs     07/20/20  1404   CXSURG Isolation in progress*  Light growth  ID and sensitivity to follow       @[id number]@    Radiology: None    Additional Studies Reviewed:  None    Impression:  Underwent left fourth and fifth toe augmentation  Stable postoperatively    Recommendations:  Continue local care  Continue antibiotic treatment  Await culture results    Sang Byrd MD

## 2020-07-22 NOTE — CARE COORDINATION
7/22/20;  Pt has been accepted at [address] [place] [address] [facility]  [address] Jones and [id number] p  [phone] f  Electronically signed by KRISTAN Rodriguez on 7/22/2020 at 9:28 AM

## 2020-07-23 VITALS
DIASTOLIC BLOOD PRESSURE: 83 MMHG | HEIGHT: 75 IN | BODY MASS INDEX: 26.56 KG/M2 | RESPIRATION RATE: 18 BRPM | WEIGHT: 213.6 LBS | SYSTOLIC BLOOD PRESSURE: 165 MMHG | TEMPERATURE: 97.7 F | HEART RATE: 54 BPM | OXYGEN SATURATION: 94 %

## 2020-07-23 LAB
ANION GAP SERPL CALCULATED.3IONS-SCNC: 10 MMOL/L (ref 7–19)
BUN BLDV-MCNC: 18 MG/DL (ref 6–20)
CALCIUM SERPL-MCNC: 9 MG/DL (ref 8.6–10)
CHLORIDE BLD-SCNC: 99 MMOL/L (ref 98–111)
CO2: 25 MMOL/L (ref 22–29)
CREAT SERPL-MCNC: 0.9 MG/DL (ref 0.5–1.2)
GFR AFRICAN AMERICAN: >59
GFR NON-AFRICAN AMERICAN: >60
GLUCOSE BLD-MCNC: 148 MG/DL (ref 70–99)
GLUCOSE BLD-MCNC: 160 MG/DL (ref 70–99)
GLUCOSE BLD-MCNC: 175 MG/DL (ref 74–109)
HCT VFR BLD CALC: 30.5 % (ref 42–52)
HEMOGLOBIN: 10.2 G/DL (ref 14–18)
MCH RBC QN AUTO: 30.2 PG (ref 27–31)
MCHC RBC AUTO-ENTMCNC: 33.4 G/DL (ref 33–37)
MCV RBC AUTO: 90.2 FL (ref 80–94)
PDW BLD-RTO: 12.6 % (ref 11.5–14.5)
PERFORMED ON: ABNORMAL
PERFORMED ON: ABNORMAL
PLATELET # BLD: 302 K/UL (ref 130–400)
PMV BLD AUTO: 8.6 FL (ref 9.4–12.4)
POTASSIUM SERPL-SCNC: 4.4 MMOL/L (ref 3.5–5)
RBC # BLD: 3.38 M/UL (ref 4.7–6.1)
SARS-COV-2, PCR: NOT DETECTED
SODIUM BLD-SCNC: 134 MMOL/L (ref 136–145)
WBC # BLD: 9.7 K/UL (ref 4.8–10.8)

## 2020-07-23 PROCEDURE — 6370000000 HC RX 637 (ALT 250 FOR IP): Performed by: SURGERY

## 2020-07-23 PROCEDURE — 6360000002 HC RX W HCPCS: Performed by: SURGERY

## 2020-07-23 PROCEDURE — 80048 BASIC METABOLIC PNL TOTAL CA: CPT

## 2020-07-23 PROCEDURE — 85027 COMPLETE CBC AUTOMATED: CPT

## 2020-07-23 PROCEDURE — U0003 INFECTIOUS AGENT DETECTION BY NUCLEIC ACID (DNA OR RNA); SEVERE ACUTE RESPIRATORY SYNDROME CORONAVIRUS 2 (SARS-COV-2) (CORONAVIRUS DISEASE [COVID-19]), AMPLIFIED PROBE TECHNIQUE, MAKING USE OF HIGH THROUGHPUT TECHNOLOGIES AS DESCRIBED BY CMS-2020-01-R: HCPCS

## 2020-07-23 PROCEDURE — 2580000003 HC RX 258: Performed by: SURGERY

## 2020-07-23 PROCEDURE — 36415 COLL VENOUS BLD VENIPUNCTURE: CPT

## 2020-07-23 PROCEDURE — 82947 ASSAY GLUCOSE BLOOD QUANT: CPT

## 2020-07-23 RX ORDER — LEVOFLOXACIN 750 MG/1
750 TABLET ORAL DAILY
Qty: 30 TABLET | Refills: 0 | Status: SHIPPED | OUTPATIENT
Start: 2020-07-23 | End: 2020-08-22

## 2020-07-23 RX ORDER — CLINDAMYCIN HYDROCHLORIDE 300 MG/1
600 CAPSULE ORAL 3 TIMES DAILY
Qty: 180 CAPSULE | Refills: 0 | Status: SHIPPED | OUTPATIENT
Start: 2020-07-23 | End: 2020-08-22

## 2020-07-23 RX ORDER — NICOTINE 21 MG/24HR
1 PATCH, TRANSDERMAL 24 HOURS TRANSDERMAL DAILY
Qty: 30 PATCH | Refills: 0 | Status: SHIPPED | OUTPATIENT
Start: 2020-07-24 | End: 2020-09-30 | Stop reason: ALTCHOICE

## 2020-07-23 RX ADMIN — INSULIN LISPRO 1 UNITS: 100 INJECTION, SOLUTION INTRAVENOUS; SUBCUTANEOUS at 12:39

## 2020-07-23 RX ADMIN — COLLAGENASE SANTYL: 250 OINTMENT TOPICAL at 09:20

## 2020-07-23 RX ADMIN — DAKIN'S SOLUTION 0.125% (QUARTER STRENGTH) 0.12 %: 0.12 SOLUTION at 09:20

## 2020-07-23 RX ADMIN — VANCOMYCIN HYDROCHLORIDE 1750 MG: 1 INJECTION, POWDER, LYOPHILIZED, FOR SOLUTION INTRAVENOUS at 03:55

## 2020-07-23 RX ADMIN — DAKIN'S SOLUTION 0.125% (QUARTER STRENGTH): 0.12 SOLUTION at 00:16

## 2020-07-23 RX ADMIN — FAMOTIDINE 20 MG: 20 TABLET ORAL at 09:19

## 2020-07-23 RX ADMIN — COLLAGENASE SANTYL: 250 OINTMENT TOPICAL at 00:17

## 2020-07-23 RX ADMIN — ATORVASTATIN CALCIUM 10 MG: 20 TABLET, FILM COATED ORAL at 09:19

## 2020-07-23 RX ADMIN — FLUOXETINE HYDROCHLORIDE 40 MG: 20 CAPSULE ORAL at 09:19

## 2020-07-23 RX ADMIN — INSULIN LISPRO 1 UNITS: 100 INJECTION, SOLUTION INTRAVENOUS; SUBCUTANEOUS at 09:19

## 2020-07-23 RX ADMIN — Medication 15 UNITS: at 09:19

## 2020-07-23 RX ADMIN — CEFEPIME 2 G: 2 INJECTION, POWDER, FOR SOLUTION INTRAMUSCULAR; INTRAVENOUS at 05:55

## 2020-07-23 NOTE — OP NOTE
Preoperative diagnosis:  1. Infected left diabetic foot ulcer with exposed left fourth metatarsal head consistent with osteomyelitis and necrosis  2. Webspace ulcers between the third and fourth/fourth and fifth toes  3. Diabetes mellitus type 2  4. Schizoaffective disorder    Post procedure diagnosis: Same    Procedure:  1. Left fourth and fifth toe open amputation at the metatarsal level    Surgeon: Stephanie Ang MD    Anesthesia: General    Estimated blood loss: Less than 100 mL    Findings:  1. The patient has pulsatile bleeding at the level of amputation. The fourth distal metatarsal head is completely fractured and there is necrotic deep subcutaneous tissue and surrounding bone. Procedure in detail:    After the patient was consented and given intravenous antibiotics, he was brought to the operating room and placed on the table supine position. General anesthesia was achieved and the patient's left leg and foot were prepped and draped in usual sterile procedure. An incision was made at the base of the fourth and fifth toes with knife at the dorsal surface and a bit more proximal on the plantar surface to try to include the ulceration. This was a fishmouth type incision extending up to the distal metatarsal laterally. Dissection was carried down to the fourth and fifth metatarsal with Bovie electrocautery and sharp dissection. The fourth and fifth metatarsals were then transected and then the toes and metatarsals were passed off the field. The metatarsal head was actually sent for surgical cultures. Hemostasis was achieved with Bovie electrocautery as well as 3-0 Vicryl figure-of-eight sutures. The wound was left open because a gross infection. However, there was no necrotic tissue left after amputation of the fourth and fifth toes. The third metatarsal is not exposed even though there is a webspace ulcer there. The wound was packed with Dakin's moistened gauze.   Sterile dressings

## 2020-07-23 NOTE — PROGRESS NOTES
Nutrition Assessment     Type and Reason for Visit: Reassess    Nutrition Recommendations/Plan: start double protein portions     Nutrition Assessment:  Pt appears adequately nourished AEB fat and muscle mass. PO intake remains good at %. Pt c/o still hungry. Starting double protein portions to help with hungry and increased needs for wound healing    Malnutrition Assessment:  Malnutrition Status: No malnutrition    Estimated Daily Nutrient Needs:  Energy (kcal): 0835-6788; Weight Used for Energy Requirements:  Current     Protein (g): 121-145; Weight Used for Protein Requirements:  Current(1.25-1. 5)        Fluid (ml/day): 7242-6662; Weight Used for Fluid Requirements:  Current      Nutrition Related Findings:    Well nourished    Current Nutrition Therapies:    DIET CARB CONTROL;     Anthropometric Measures:  · Height: 6' 3\" (190.5 cm)  · Current Body Wt: 213 lb 9 oz (96.9 kg)   · BMI: 26.7    Nutrition Diagnosis:   · Increased nutrient needs related to increase demand for energy/nutrients as evidenced by wounds      Nutrition Interventions:   Food and/or Nutrient Delivery:  Modify Current Diet  Nutrition Education/Counseling:  No recommendation at this time   Coordination of Nutrition Care:  Continued Inpatient Monitoring    Goals:  PO intake >50%, s/s of wound healing       Nutrition Monitoring and Evaluation:   Behavioral-Environmental Outcomes:      Food/Nutrient Intake Outcomes:  Food and Nutrient Intake  Physical Signs/Symptoms Outcomes:  Biochemical Data, Skin, Weight     Discharge Planning:    Continue current diet     Electronically signed by Katarina Owen, MS, RD, LD on 7/23/20 at 12:26 PM CDT    Contact: [phone]

## 2020-07-23 NOTE — PROGRESS NOTES
Physical Therapy  Sravanthi Ortiz  [id number]     07/23/20 1012   Subjective   Subjective Attempt, patient states he does not want to get up at this time but states he will get up later. Will cont to follow.    Electronically signed by Dayton Abdalla PTA on 7/23/2020 at 10:13 AM

## 2020-07-23 NOTE — PROGRESS NOTES
Infectious Diseases Progress Note    Patient:  Michael Dunn  YOB: 1965  MRN: [id number]   Admit date: 7/17/2020   Admitting Physician: Mansoor Louis DO  Primary Care Physician: No primary care provider on file. Chief Complaint/Interval History:  He is without fever. Pictures in epic reviewed. No nausea or vomiting. No diarrhea. No rash or skin itching. He is comfortable at present. No cardiopulmonary symptoms. Per discussion with nursing possible nursing home placement for wound care and antibiotic treatment. In/Out    Intake/Output Summary (Last 24 hours) at 7/23/2020 0953  Last data filed at 7/23/2020 0600  Gross per 24 hour   Intake 2160 ml   Output 1900 ml   Net 260 ml     Allergies:    Allergies   Allergen Reactions    Clozaril [Clozapine]     Haldol [Haloperidol]     Lithium     Navane [Thiothixene]      Current Meds: collagenase ointment, BID  0.9 % sodium chloride infusion, Continuous  sodium hypochlorite (DAKINS) 0.125 % external solution, BID  vancomycin (VANCOCIN) 1,750 mg in sodium chloride 0.9 % 500 mL IVPB, Q12H  insulin glargine (LANTUS) injection vial 15 Units, BID  ceFEPIme (MAXIPIME) 2 g in sterile water 20 mL IV syringe, Q12H  FLUoxetine (PROZAC) capsule 40 mg, Daily  hydrOXYzine (ATARAX) tablet 25 mg, TID PRN  OLANZapine (ZYPREXA) tablet 10 mg, Nightly  risperiDONE (RISPERDAL) tablet 4 mg, Nightly  atorvastatin (LIPITOR) tablet 10 mg, Daily  traZODone (DESYREL) tablet 50 mg, Nightly  sodium chloride flush 0.9 % injection 10 mL, 2 times per day  sodium chloride flush 0.9 % injection 10 mL, PRN  potassium chloride (KLOR-CON M) extended release tablet 40 mEq, PRN    Or  potassium bicarb-citric acid (EFFER-K) effervescent tablet 40 mEq, PRN    Or  potassium chloride 10 mEq/100 mL IVPB (Peripheral Line), PRN  magnesium sulfate 1 g in dextrose 5% 100 mL IVPB, PRN  acetaminophen (TYLENOL) tablet 650 mg, Q6H PRN    Or  acetaminophen (TYLENOL) suppository 650 mg, Q6H PRN  polyethylene glycol (GLYCOLAX) packet 17 g, Daily PRN  promethazine (PHENERGAN) tablet 12.5 mg, Q6H PRN    Or  ondansetron (ZOFRAN) injection 4 mg, Q6H PRN  famotidine (PEPCID) tablet 20 mg, BID  vancomycin (VANCOCIN) intermittent dosing (placeholder), RX Placeholder  insulin lispro (HUMALOG) injection vial 0-6 Units, TID WC  insulin lispro (HUMALOG) injection vial 0-3 Units, Nightly  nicotine (NICODERM CQ) 21 MG/24HR 1 patch, Daily  glucose (GLUTOSE) 40 % oral gel 15 g, PRN  dextrose 50 % IV solution, PRN  glucagon (rDNA) injection 1 mg, PRN  dextrose 5 % solution, PRN      Review of Systems No cardiopulmonary symptoms. No gastrointestinal symptoms. VitalSigns:  BP (!) 165/83   Pulse 54   Temp 97.7 °F (36.5 °C) (Temporal)   Resp 18   Ht 6' 3\" (1.905 m)   Wt 213 lb 9.6 oz (96.9 kg)   SpO2 94%   BMI 26.70 kg/m²      Physical Exam  Line/IV site: No erythema, warmth, induration, or tenderness. Pictures from right plantar foot wounds in epic reviewed. Fairly good size ulcerated area right great toe. He has some blistering on the second toe. He also has some blistering on the plantar aspect of the foot. Picture in epic from surgery left lateral foot reviewed. Some marginal material proximally. Some granulation distally.   Lungs clear without crackles  Abdomen soft nondistended    Lab Results:  CBC:   Recent Labs     07/21/20  0338 07/22/20  0142 07/23/20  0140   WBC 10.9* 10.0 9.7   HGB 11.3* 11.5* 10.2*    320 302     BMP:  Recent Labs     07/21/20  0338 07/22/20  0142 07/23/20  0140   * 135* 134*   K 4.2 4.7 4.4   CL 93* 98 99   CO2 25 24 25   BUN 15 15 18   CREATININE 1.1 1.0 0.9   GLUCOSE 328* 161* 175*     CultureResults:  Surgical cultures:  Component  Collected  Lab    Gram Stain Result Abnormal    07/20/2020  2:04 PM  [facility]    Many WBC's (Polymorphonuclear)   Moderate Gram positive cocci  in pairs    Anaerobic Culture  07/20/2020  2:04 PM  [facility]    No anaerobes to date,will hold 4 days    Organism Abnormal    07/20/2020  2:04 PM  [address] [place] [address] Lab    Proteus penneri    Culture Surgical  07/20/2020  2:04 PM  [address] [place] [address] Lab    Moderate growth    Organism Abnormal    07/20/2020  2:04 PM  [address] [place] [address] Lab    Morganella morganii ssp morganii    Culture Surgical  07/20/2020  2:04 PM  [address] [facility] [place] [place] [facility]    Light growth    Organism Abnormal    07/20/2020  2:04 PM  [address] [facility] [place] [place] Lab    Streptococcus alpha-hemolytic    Culture Surgical  07/20/2020  2:04 PM  [address] [place] [address] Lab    Light growth   No further workup   Streptococcus are frequently susceptible to Penicillin. Some isolates may require combined therapy with an   aminoglycoside for bactericidal action. Testing Performed By     [facility]  Name  Director  Address  Valid Date Range    [address]T [address] [facility] [place] [facility]  Susan Fuller MD  [address]   [address] [place] [address]  08/30/17 0733-Present    Narrative   Performed by: [address] [place] [address] Lab   ORDER#: [id number]                          ORDERED BY: Ira Arguello   SOURCE: Toe 4th metatarsal  bone Left      COLLECTED:  07/20/20 14:04   ANTIBIOTICS AT [facility]. :                      RECEIVED :  07/20/20 15:37    Susceptibility     Proteus penneri (1)     Antibiotic  Interpretation  MARITO  Status     ampicillin  Resistant  >=32  mcg/mL      aztreonam  Sensitive  <=1  mcg/mL      ceFAZolin  Resistant  >=64  mcg/mL      cefepime  Sensitive  <=1  mcg/mL      cefTRIAXone  Sensitive  <=1  mcg/mL      gentamicin  Sensitive  4  mcg/mL      levofloxacin  Sensitive  <=0.12  mcg/mL      meropenem  Sensitive  <=0.25  mcg/mL      piperacillin-tazobactam  Sensitive  <=4  mcg/mL      trimethoprim-sulfamethoxazole  Resistant  >=320  mcg/mL      Morganella morganii ssp morganii (2)     Antibiotic  Interpretation  MARITO  Status     ampicillin  Resistant  >=32  mcg/mL      aztreonam Sensitive  <=1  mcg/mL      ceFAZolin  Resistant  >=64  mcg/mL      cefepime  Sensitive  <=1  mcg/mL      cefTRIAXone  Sensitive  <=1  mcg/mL      ertapenem  Sensitive  <=0.5  mcg/mL      gentamicin  Sensitive  <=1  mcg/mL      levofloxacin  Sensitive  1  mcg/mL      meropenem  Sensitive  <=0.25  mcg/mL      piperacillin-tazobactam  Sensitive  <=4  mcg/mL      trimethoprim-sulfamethoxazole  Resistant  >=320  mcg/mL        Radiology: None    Additional Studies Reviewed:  None    Impression:  1. Right great toe ulcer plantar aspect of the foot  2. Right plantar foot blistering  3. Diabetes mellitus  4. Tobacco use  5. Peripheral vascular disease    Recommendations:  Discontinue vancomycin  Discontinue cefepime  Feel there would be 2 reasonable options for him based on his culture results    Option #1:  Levofloxacin 750 mg orally daily for 1 month  Clindamycin 600 mg orally every 8 hours for 1 month    Option #2:  Ceftriaxone 2 g IV daily for 1 month  Clindamycin 600 mg orally every 8 hours for 1 month    If he is willing to accept the risks of fluoroquinolone treatment including tendon soreness, tendon weakening and in very rare circumstances tendon rupture and I would proceed with option 1. This would help him avoid risks of PICC line and PICC line related complication. Option #2 be reasonable if he would prefer IV instead of oral levofloxacin treatment. Feel offloading will be very important. Okay with me to nursing home for wound care, antibiotic treatment, offloading, and physical therapy.   Okay with me to nursing, if he is ready for release per others    Mary Anne Verma MD

## 2020-07-23 NOTE — DISCHARGE SUMMARY
Sravanthi Ortiz  :  1965  MRN:  [id number]    Admit date:  2020  Discharge date:      Admitting Physician:  Daja Loyola MD    Advance Directive: Full Code    Consults: ID and vascular surgery    Primary Care Physician:  No primary care provider on file. Discharge Diagnoses:  Principal Problem:    Osteomyelitis of ankle or foot, acute, left (HCC)  Active Problems:    Major depression, recurrent, chronic (HCC)    Bipolar disorder ([facility] [facility]    Psychiatric disorder    Leukocytosis    Skin ulcers of foot, bilateral (HCC)    PVD (peripheral vascular disease) ([facility] [facility]    Tobacco abuse counseling    Cigarette nicotine dependence with nicotine-induced disorder    Schizophrenia ([facility] [address]  Resolved Problems:    * No resolved hospital problems. *      Significant Diagnostic Studies:   Xr Foot Left (min 3 Views)    Result Date: 2020  XR FOOT LEFT (MIN 3 VIEWS) 2020 1:15 PM HISTORY: Ulcerations of the left foot COMPARISON: None FINDINGS: Frontal, lateral and oblique radiographs of the left foot were provided for review. Soft tissue ulcers are seen in the plantar aspect of the foot. Destructive changes are noted in the 1st, 3rd, 4th, and 5th metatarsal heads. There is near complete destruction of the 1st proximal phalanx. Soft tissue swelling is present. 1. Findings are highly suggestive of acute osteomyelitis in the 4th and 5th metatarsal heads. Involvement of the base of the 4th proximal phalanx is also suspected. 2. Chronic osteomyelitis of the 1st metatarsal head and great toe and the 3rd metatarsal head. 3. Soft tissue air in the plantar aspect of the 4th MTP joint. Signed by Dr Debby Multani on 2020 2:20 PM    Xr Foot Right (min 3 Views)    Result Date: 2020  XR FOOT RIGHT (MIN 3 VIEWS) 2020 1:15 PM HISTORY: Ulceration right foot COMPARISON: None FINDINGS: Frontal, lateral and oblique radiographs of the right foot were provided for review. No ulceration is identified in the right foot.  No destructive changes are seen in the bones. A chronic deformity of the 2nd metatarsal shaft could be from old fracture or osteomyelitis. Hallux valgus deformity is present. Scattered degenerative changes are noted. 1. No evidence of acute bony abnormality in the right foot. While there is no radiographic evidence of osteomyelitis, the findings of acute osteomyelitis do not appear on radiographs for 10 to 14 days. MRI would be more sensitive and specific for diagnosis of acute osteomyelitis. If the patient is not a candidate for MRI, nuclear medicine three-phase bone scan would be recommended. Signed by Dr Som Myers on 7/17/2020 2:22 PM    Vl Kateryna Bilateral Limited 1-2 Levels    Result Date: 7/18/2020  Vascular Lower Arterial Plethysmography Procedure  Demographics   Patient Name     Brien Nurse Age                    50   Patient Number   [id number]       Gender                 Male   Visit Number     [id number]    Interpreting Physician Fernie Farias MD   Date of Birth    1965   Referring Physician    Fernie Farias MD   Accession Number [id number]   [id number] [facility]  Procedure Type of Study:   Extremities Arteries:Lower Arterial Plethysmography, VL ANKLE / BRACHIAL  INDICES EXTREMITY COMPLETE . Indications for Study:Wound Lower Extremity (Left) and Wound Lower Extremity (Right). Risk Factors   - The patient's risk factor(s) include: dyslipidemia and obesity.   - Current - Every day.   Impression   Based on ankle brachial indices and doppler waveforms, the patient has  mildly diminished flow to the bilateral lower extremity arterial system at  rest.   Signature   ----------------------------------------------------------------  Electronically signed by Fernie Farias MD(Interpreting  physician) on 07/18/2020 02:57 PM  ----------------------------------------------------------------  Velocities are measured in cm/s ; Diameters are measured in mm Pressures +--------------------------------------++--------+-----+----+--------+-----+ ! ! !Right   ! ! Left!        !     ! +--------------------------------------++--------+-----+----+--------+-----+ ! Location                              ! !Pressure! Ratio! !Pressure! Ratio! +--------------------------------------++--------+-----+----+--------+-----+ ! Ankle PT                              !!149     !1.18 !    !170     !1.35 ! +--------------------------------------++--------+-----+----+--------+-----+ ! DP                                    !!143     !1.13 !    !145     ! 1.15 ! +--------------------------------------++--------+-----+----+--------+-----+ ! Great Toe                             !!125     !0.99 ! !83      !0.66 ! +--------------------------------------++--------+-----+----+--------+-----+   - Brachial Pressure:Right: 126. Left:126.   - ALEXANDER:Right: 1.18. Left: 1.35. Plethysmographic Digit Evaluation +---------++--------+-----+---------------++--------+-----+----------------+ ! ! !Right   ! ! Left           !!        !     !                ! +---------++--------+-----+---------------++--------+-----+----------------+ ! Location ! !Pressure! Ratio! PPG Wave Form  ! !Pressure! Ratio! PPG Wave Form   ! +---------++--------+-----+---------------++--------+-----+----------------+ ! Great RSN!!778     !0.99 !               !!80      !0.66 !                ! +---------++--------+-----+---------------++--------+-----+----------------+    Vl Dup Lower Extremity Arteries Bilateral    Result Date: 7/18/2020  Vascular Lower Extremities Arterial Duplex Procedure  Demographics   Patient Name     Dominguez Ca Age                    50   Patient Number   [id number]       Gender                 Male   Visit Number     [id number]    Interpreting Physician Berkley Sandoval MD   Date of Birth    1965   Referring Physician    Berkley Sandoval MD   Accession Number [id number]   [facility]  Procedure Type of Study:   Extremities Arteries:Lower Extremities Arterial Duplex, VL LOWER EXTREMITY  ARTERIES DUPLEX BILATERAL. Indications for Study:Wound Lower Extremity (Left) and Wound Lower Extremity (Right). Risk Factors   - The patient's risk factor(s) include: dyslipidemia and obesity.   - Current - Every day. Impression   No evidence of significant arterial occlusive disease in the bilateral  lower extremities. Signature   ----------------------------------------------------------------  Electronically signed by Hank Chew MD(Interpreting  physician) on 07/18/2020 02:58 PM  ----------------------------------------------------------------  Velocities are measured in cm/s ; Diameters are measured in mm LE Duplex Measurements +---------------++-----+-----+----+-----------++----+-----+----+-----------+ ! ! !Right! ! Left!           !!    !     !    !           ! +---------------++-----+-----+----+-----------++----+-----+----+-----------+ ! Location       ! !PSV  ! Ratio! EDV ! Wave Desc. !!PSV ! Ratio! EDV ! Wave Desc. ! +---------------++-----+-----+----+-----------++----+-----+----+-----------+ ! Common Femoral !!112  !     !    !           !!140 !     !    !           ! +---------------++-----+-----+----+-----------++----+-----+----+-----------+ ! Prox PFA       !!84.9 !     !    !           !!109 !     !11.8!           ! +---------------++-----+-----+----+-----------++----+-----+----+-----------+ ! Prox SFA       !!104  !     !    !           !!94.3!     !    !           ! +---------------++-----+-----+----+-----------++----+-----+----+-----------+ ! Mid SFA        !!109  ! 1.05 !    !           !!113 !1.2  !17.3!           ! +---------------++-----+-----+----+-----------++----+-----+----+-----------+ ! Dist SFA       !!114  !1.05 !    !           !!134 !1.19 !    !           ! +---------------++-----+-----+----+-----------++----+-----+----+-----------+ ! Prox Popliteal !!62.1 !0.54 ! !           !!88.8!0.66 !    !           ! +---------------++-----+-----+----+-----------++----+-----+----+-----------+ ! Dist Popliteal !!86.8 !1.4  !    !           !!95.1!1.07 !    !           ! +---------------++-----+-----+----+-----------++----+-----+----+-----------+ ! Mid PTA        !!     !     !    !           !!111 !1.17 !    !           ! +---------------++-----+-----+----+-----------++----+-----+----+-----------+ ! Dist PTA       !!116  !1.34 !    !           !!    !     !    !           ! +---------------++-----+-----+----+-----------++----+-----+----+-----------+ ! Prox FISH       !!82.1 !0.95 !    !           !!87.4!0.92 !    !           ! +---------------++-----+-----+----+-----------++----+-----+----+-----------+ ! Mid Peroneal   !!     !     !    !           !!53  !0.56 !    !           ! +---------------++-----+-----+----+-----------++----+-----+----+-----------+ ! Dist Peroneal  !!51.5 !0.59 !    !           !!    !     !    !           ! +---------------++-----+-----+----+-----------++----+-----+----+-----------+      Pertinent Labs:   CBC:   Recent Labs     07/21/20  0338 07/22/20  0142 07/23/20  0140   WBC 10.9* 10.0 9.7   HGB 11.3* 11.5* 10.2*    320 302     BMP:    Recent Labs     07/21/20  0338 07/22/20  0142 07/23/20  0140   * 135* 134*   K 4.2 4.7 4.4   CL 93* 98 99   CO2 25 24 25   BUN 15 15 18   CREATININE 1.1 1.0 0.9   GLUCOSE 328* 161* 175*     INR: No results for input(s): INR in the last 72 hours. ABGs:No results for input(s): PH, PO2, PCO2, HCO3, BE, O2SAT in the last 72 hours. Lactic Acid:No results for input(s): LACTA in the last 72 hours. Procedures: Patient underwent amputation of left fourth and fifth toes on July 20, 2020. Hospital Course:   [copied from previous provider]  7/17/2020  Lily Wahl an 50 y. o. male.  He is a very unfortunate male with chronic smoking, PVD and chronic medical issues who is a nursing home resident. Emilyesia Paci was sent to the Diarrhea             nicotine (NICODERM CQ) 21 MG/24HR  Place 1 patch onto the skin daily             OLANZapine zydis (ZYPREXA) 20 MG disintegrating tablet  Take 20 mg by mouth nightly             polyethylene glycol (GLYCOLAX) 17 g packet  Take 17 g by mouth daily as needed for Constipation             raNITIdine (ZANTAC) 150 MG tablet  Take 150 mg by mouth 2 times daily as needed for Heartburn             risperiDONE (RISPERDAL) 4 MG tablet  Take 4 mg by mouth nightly             risperiDONE microspheres ER (RISPERDAL CONSTA) 50 MG injection  Inject 50 mg into the muscle every 14 days Due 7/22/2020             simvastatin (ZOCOR) 20 MG tablet  Take 20 mg by mouth nightly             traZODone (DESYREL) 50 MG tablet  Take 50 mg by mouth nightly                 Discharge Instructions: Follow up with No primary care provider on file. in 7 days. Take medications as directed. Resume activity as tolerated. Diet: DIET CARB CONTROL;     Disposition: Patient is medically stable and will be discharged Skilled nursing facility. Time spent on discharge greater than 30 minutes.     Signed:  Mae Arevalo DO

## 2020-07-23 NOTE — PROGRESS NOTES
Vascular Surgery  Dr.Scott Daphne Foster   Daily Progress Note    Pt Name: Sinai Barry Record Number: [id number]  Date of Birth 1965   Today's Date: 7/23/2020    SUBJECTIVE:     Patient was seen and examined. No complaints voiced, asking when breakfast is served and he wants extra food  Stating he has no foot pain at present time    OBJECTIVE:     Patient Vitals for the past 24 hrs:   BP Temp Temp src Pulse Resp SpO2   07/23/20 0630 (!) 165/83 97.7 °F (36.5 °C) Temporal 54 18 94 %   07/23/20 0237 128/83 98.2 °F (36.8 °C) Temporal 96 17 95 %   07/22/20 1906 129/74 97.6 °F (36.4 °C) Temporal 72 17 96 %       Intake/Output Summary (Last 24 hours) at 7/23/2020 0732  Last data filed at 7/23/2020 0600  Gross per 24 hour   Intake 2620 ml   Output 2250 ml   Net 370 ml     In: 1680 [I.V.:1680]  Out: 1900 [Urine:1900]    I/O last 3 completed shifts: In: 2620 [P.O.:940; I.V.:1680]  Out: 2250 [Urine:2250]   Date 07/23/20 0000 - 07/23/20 2359   Shift 7940-4456 7354-6726 2204-6248 24 Hour Total   INTAKE   I.V.(mL/kg) 4155(13.4)   7932(66.5)   Shift Total(mL/kg) 8814(85.5)   1798(28.4)   OUTPUT   Urine(mL/kg/hr) 1700   1700   Shift Total(mL/kg) 1700(17.5)   1700(17.5)   Weight (kg) 96.9 96.9 96.9 96.9     Wt Readings from Last 3 Encounters:   07/20/20 213 lb 9.6 oz (96.9 kg)      Body mass index is 26.7 kg/m².      Diet: DIET CARB CONTROL;      MEDS:     Scheduled Meds:   collagenase   Topical BID    sodium hypochlorite   Irrigation BID    vancomycin  1,750 mg Intravenous Q12H    insulin glargine  15 Units Subcutaneous BID    cefepime  2 g Intravenous Q12H    FLUoxetine  40 mg Oral Daily    OLANZapine  10 mg Oral Nightly    risperiDONE  4 mg Oral Nightly    atorvastatin  10 mg Oral Daily    traZODone  50 mg Oral Nightly    sodium chloride flush  10 mL Intravenous 2 times per day    famotidine  20 mg Oral BID    vancomycin (VANCOCIN) intermittent dosing (placeholder)   Other RX Placeholder    insulin lispro  0-6 Units Subcutaneous TID WC    insulin lispro  0-3 Units Subcutaneous Nightly    nicotine  1 patch Transdermal Daily     Continuous Infusions:   sodium chloride 100 mL/hr at 07/22/20 0227    dextrose       PRN Meds:hydrOXYzine, 25 mg, TID PRN  sodium chloride flush, 10 mL, PRN  potassium chloride, 40 mEq, PRN    Or  potassium alternative oral replacement, 40 mEq, PRN    Or  potassium chloride, 10 mEq, PRN  magnesium sulfate, 1 g, PRN  acetaminophen, 650 mg, Q6H PRN    Or  acetaminophen, 650 mg, Q6H PRN  polyethylene glycol, 17 g, Daily PRN  promethazine, 12.5 mg, Q6H PRN    Or  ondansetron, 4 mg, Q6H PRN  glucose, 15 g, PRN  dextrose, 12.5 g, PRN  glucagon (rDNA), 1 mg, PRN  dextrose, 100 mL/hr, PRN      PHYSICAL EXAM:     CONSTITUTIONAL: awake, cooperative, no apparent distress,   LUNGS: Chest expands equally bilaterally upon respiration, no accessory muscle used. Ausculation reveals no wheezes, rales or rhonchi. CARDIOVASCULAR: Heart regular rate and rhythm  ABDOMEN: soft, nontender, nondistended  NEUROLOGIC: Awake, oriented to name, affect flat  WOUND/INCISION:  Left foot dressing is intact, right foot dressing intact  EXTREMITY:Feet warm to touch    Document Information     Wound    Left lateral foot photo    07/22/2020 12:58    Attached To: Hospital Encounter on 7/17/20    Source [address], RN  [email or web address]      Document Information     Wound    Right plantar forefoot photo    07/22/2020 13:09    Attached To:     Hospital Encounter on 7/17/20    [address], RN  [facility]     LABS:     CBC:   Recent Labs     07/21/20  0338 07/22/20  0142 07/23/20  0140   WBC 10.9* 10.0 9.7   RBC 3.71* 3.81* 3.38*   HGB 11.3* 11.5* 10.2*   HCT 33.3* 34.8* 30.5*   MCV 89.8 91.3 90.2   MCH 30.5 30.2 30.2   MCHC 33.9 33.0 33.4   RDW 12.4 12.4 12.6    320 302   MPV 8.5* 8.6* 8.6*      Last 3 CMP:   Recent Labs     07/21/20  0338 07/22/20  0142 07/23/20  0140   * 135* 134*   K 4.2 4.7 4.4   CL 93* 98 99   CO2 25 24 25   BUN 15 15 18   CREATININE 1.1 1.0 0.9   GLUCOSE 328* 161* 175*   CALCIUM 9.1 9.3 9.0      Calcium:   Lab Results   Component Value Date    CALCIUM 9.0 07/23/2020    CALCIUM 9.3 07/22/2020    CALCIUM 9.1 07/21/2020      Lactic Acid:   Lab Results   Component Value Date    LACTA 1.2 07/17/2020        Gram Stain Result Abnormal    07/20/2020  2:04 PM  [address] [facility]    Many WBC's (Polymorphonuclear)   Moderate Gram positive cocci  in pairs    Culture Surgical Abnormal    07/20/2020  2:04 PM  [address] [facility]    Isolation in progress    Anaerobic Culture  07/20/2020  2:04 PM  [address] [facility]    No anaerobes to date,will hold 4 days    Organism Abnormal    07/20/2020  2:04 PM  [address] [facility]    Gram negative miguel    Culture Surgical  07/20/2020  2:04 PM  [address] [facility]    Light growth   ID and sensitivity to follow        DVT prophylaxis:                                  [x] SCDs                                   ASSESSMENT:     Procedure 7/20/20 (s):  1. LEFT 4TH AND 5TH OPEN TOE AMPUTATION (METATARSAL LEVEL)  1. HD # 6  Patient Active Problem List   Diagnosis    Osteomyelitis of ankle or foot, acute, left (HCC)    Major depression, recurrent, chronic (HCC)    B[facility] disorder ([facility]    Psychiatric disorder    Leukocytosis    Skin ulcers of foot, bilateral ([facility]    PVD (peripheral vascular disease) ([facility] [address] [facility]    Tobacco abuse counseling    Cigarette nicotine dependence with nicotine-induced disorder    Schizophrenia [facility])       Chief Complaint:  Chief Complaint   Patient presents with    Wound Infection     arrived via EMS [facility] with quan foot ulcers       PLAN:     1.  IV abx per ID, just spoke with micro and sensitivities will be available within the hour  2. Dakins moist to dry BID left foot wound. Will plan placement of VAC dressing to left lateral foot .   Promogran over bone in wound bed, Black [employer] Foam in open wound, VAC at -125mmHg cont, change on MWF  3.   PT evaluated for safety with offload shoe. Forefoot offload shoe left foot. Will need to do pivot/transfers with limited walking. With pressure areas and wounds on right foot will be difficult to offload this foot also, will need to use wheelchair for main mobility. 4.   Spoke with [facility] [place] [facility]) with  Pola Kaur. Patient will need to go to [facility] and they have offered a bed.

## 2020-07-24 LAB
ANAEROBIC CULTURE: ABNORMAL
CULTURE SURGICAL: ABNORMAL
GRAM STAIN RESULT: ABNORMAL
ORGANISM: ABNORMAL

## 2020-08-12 ENCOUNTER — HOSPITAL ENCOUNTER (OUTPATIENT)
Dept: WOUND CARE | Age: 55
Discharge: HOME | End: 2020-08-12
Payer: MEDICARE

## 2020-08-12 VITALS
HEART RATE: 79 BPM | SYSTOLIC BLOOD PRESSURE: 150 MMHG | TEMPERATURE: 97.5 F | HEIGHT: 75 IN | BODY MASS INDEX: 26.49 KG/M2 | RESPIRATION RATE: 18 BRPM | WEIGHT: 213 LBS | DIASTOLIC BLOOD PRESSURE: 79 MMHG

## 2020-08-12 PROCEDURE — 97597 DBRDMT OPN WND 1ST 20 CM/<: CPT

## 2020-08-12 PROCEDURE — 99024 POSTOP FOLLOW-UP VISIT: CPT | Performed by: SURGERY

## 2020-08-12 PROCEDURE — 11044 DBRDMT BONE 1ST 20 SQ CM/<: CPT

## 2020-08-12 PROCEDURE — 99214 OFFICE O/P EST MOD 30 MIN: CPT

## 2020-08-12 ASSESSMENT — PAIN SCALES - GENERAL: PAINLEVEL_OUTOF10: 0

## 2020-08-12 NOTE — PLAN OF CARE
Problem: Wound:  Goal: Will show signs of wound healing; wound closure and no evidence of infection  Description: Will show signs of wound healing; wound closure and no evidence of infection  Outcome: Ongoing     Problem: Falls - Risk of:  Goal: Will remain free from falls  Description: Will remain free from falls  Outcome: Ongoing     Problem: Blood Glucose:  Goal: Ability to maintain appropriate glucose levels will improve  Description: Ability to maintain appropriate glucose levels will improve  Outcome: Ongoing

## 2020-08-12 NOTE — PROGRESS NOTES
for Pain or Fever      raNITIdine (ZANTAC) 150 MG tablet Take 150 mg by mouth 2 times daily as needed for Heartburn      polyethylene glycol (GLYCOLAX) 17 g packet Take 17 g by mouth daily as needed for Constipation      loperamide (IMODIUM) 2 MG capsule Take 2 mg by mouth 4 times daily as needed for Diarrhea      Dextromethorphan-guaiFENesin (ROBITUSSIN DM PO) Take by mouth as needed      risperiDONE microspheres ER (RISPERDAL CONSTA) 50 MG injection Inject 50 mg into the muscle every 14 days Due 7/22/2020      FLUoxetine (PROZAC) 40 MG capsule Take 40 mg by mouth nightly      OLANZapine zydis (ZYPREXA) 20 MG disintegrating tablet Take 20 mg by mouth nightly      insulin glargine (BASAGLAR KWIKPEN) 100 UNIT/ML injection pen Inject 15 Units into the skin nightly      hydrOXYzine (ATARAX) 25 MG tablet Take 25 mg by mouth 3 times daily as needed for Itching      risperiDONE (RISPERDAL) 4 MG tablet Take 4 mg by mouth nightly      simvastatin (ZOCOR) 20 MG tablet Take 20 mg by mouth nightly      traZODone (DESYREL) 50 MG tablet Take 50 mg by mouth nightly       No current facility-administered medications on file prior to encounter. REVIEW OF SYSTEMS    Pertinent items are noted in HPI.     Objective:      BP (!) 150/79   Pulse 79   Temp 97.5 °F (36.4 °C) (Temporal)   Resp 18   Ht 6' 3\" (1.905 m)   Wt 213 lb (96.6 kg)   BMI 26.62 kg/m²     Wt Readings from Last 3 Encounters:   08/12/20 213 lb (96.6 kg)   07/20/20 213 lb 9.6 oz (96.9 kg)       PHYSICAL EXAM    General Appearance: alert and oriented to person, place and time, well-developed and well-nourished, in no acute distress      Assessment:      Patient Active Problem List   Diagnosis Code    Osteomyelitis of ankle or foot, acute, left (HCC) M86.172    Major depression, recurrent, chronic ([facility]) F33.9    Bipolar disorder ([facility] [address] F31.9    Psychiatric disorder F99    Leukocytosis D72.829    Skin ulcers of foot, bilateral ([facility] [address] L97.519, L97.529    PVD (peripheral vascular disease) ([facility]) I73.9    Tobacco abuse counseling Z71.6    Cigarette nicotine dependence with nicotine-induced disorder F17.219    Schizophrenia ([address] F20.9        Procedure Note  Indications:  Based on my examination of this patient's wound(s)/ulcer(s) today, debridement is required to promote healing and evaluate the wound base. Performed by: Qasim Clifton MD    Consent obtained:  Yes    Time out taken:  Yes    Pain Control:         Debridement:Excisional Debridement    Using rongeur the wound(s)/ulcer(s) was/were sharply debrided down through and including the removal of bone. Pre Debridement Measurements:  Are located in the [place]  Documentation Flow Sheet    Wound/Ulcer #: 1  Wounds 2 and 3 right foot I performed non excisional debridement sharply  Post Debridement Measurements:  Wound/Ulcer Descriptions are Pre Debridement except measurements:    Negative Pressure Wound Therapy Foot Left (Active)   Wound Type Diabetic foot ulcer 08/12/20 1454   Unit Type [employer] 08/12/20 1454   Dressing Type Black foam 08/12/20 1454   Cycle Continuous 08/12/20 1454   Target Pressure (mmHg) 125 08/12/20 1454   Canister changed? No 08/12/20 1454   Drainage Amount Small 08/12/20 1454   Drainage Description Serosanguinous 08/12/20 1454   Output (ml) 25 ml 08/12/20 1454   Wound Assessment Red;Pink;Yellow;Slough 08/12/20 1454   Corine-wound Assessment Maceration; White 08/12/20 1454   Shape irregular 08/12/20 1454   Odor Strong 08/12/20 1454   Number of days: 0       Wound 07/17/20 Foot Plantar;Right Wound 2, Diabetic Lan 1, R. 1st Metatarsal Pad (Active)   Wound Image   08/12/20 1454   Wound Diabetic Lan 1 08/12/20 1454   Dressing Status Old drainage 08/12/20 1454   Dressing Changed Changed/New 07/22/20 2211   Dressing/Treatment Pharmaceutical agent (see MAR) 07/22/20 2211   Wound Cleansed Soap and water 08/12/20 1454   Dressing Change Due 07/23/20 07/22/20 2211 Wound Length (cm) 0.6 cm 08/12/20 1454   Wound Width (cm) 0.6 cm 08/12/20 1454   Wound Depth (cm) 0.1 cm 08/12/20 1454   Wound Surface Area (cm^2) 0.36 cm^2 08/12/20 1454   Wound Volume (cm^3) 0.04 cm^3 08/12/20 1454   Post-Procedure Length (cm) 0.6 cm 08/12/20 1551   Post-Procedure Width (cm) 0.6 cm 08/12/20 1551   Post-Procedure Depth (cm) 0.1 cm 08/12/20 1551   Post-Procedure Surface Area (cm^2) 0.36 cm^2 08/12/20 1551   Post-Procedure Volume (cm^3) 0.04 cm^3 08/12/20 1551   Distance Tunneling (cm) 0 cm 08/12/20 1454   Tunneling Position ___ O'Clock 0 08/12/20 1454   Undermining Starts ___ O'Clock 0 08/12/20 1454   Undermining Ends___ O'Clock 0 08/12/20 1454   Undermining Maxium Distance (cm) 0 08/12/20 1454   Wound Assessment Pink;Slough; Yellow;Drainage 08/12/20 1454   Drainage Amount Small 08/12/20 1454   Drainage Description Serosanguinous 08/12/20 1454   Odor None 08/12/20 1454   Margins Attached edges 08/12/20 1454   Corine-wound Assessment White;Calloused 08/12/20 1454   Non-staged Wound Description Not applicable 91/38/65 7227   [place]%Wound Bed 50 08/12/20 1454   Red%Wound Bed 0 08/12/20 1454   Yellow%Wound Bed 50 08/12/20 1454   Black%Wound Bed 0 08/12/20 1454   Purple%Wound Bed 0 08/12/20 1454   Other%Wound Bed 0 08/12/20 1454   Number of days: 25       Wound 07/17/20 Toe (Comment  which one) Plantar;Right Wound 3, Diabetic Lan 1,R. Great toe Plantar (Active)   Wound Image    08/12/20 1454   Wound Diabetic Lan 1 08/12/20 1454   Dressing Status Old drainage 08/12/20 1454   Dressing Changed Changed/New 07/21/20 2300   Dressing/Treatment Moist to dry 07/19/20 1032   Wound Cleansed Soap and water 08/12/20 1454   Dressing Change Due 07/19/20 07/19/20 1032   Wound Length (cm) 0.5 cm 08/12/20 1454   Wound Width (cm) 0.5 cm 08/12/20 1454   Wound Depth (cm) 0.1 cm 08/12/20 1454   Wound Surface Area (cm^2) 0.25 cm^2 08/12/20 1454   Wound Volume (cm^3) 0.02 cm^3 08/12/20 1454   Post-Procedure Length (cm) 0.5 cm 08/12/20 1551   Post-Procedure Width (cm) 0.5 cm 08/12/20 1551   Post-Procedure Depth (cm) 0.1 cm 08/12/20 1551   Post-Procedure Surface Area (cm^2) 0.25 cm^2 08/12/20 1551   Post-Procedure Volume (cm^3) 0.02 cm^3 08/12/20 1551   Distance Tunneling (cm) 0 cm 08/12/20 1454   Tunneling Position ___ O'Clock 0 08/12/20 1454   Undermining Starts ___ O'Clock 0 08/12/20 1454   Undermining Ends___ O'Clock 0 08/12/20 1454   Undermining Maxium Distance (cm) 0 08/12/20 1454   Wound Assessment White;Drainage;Pink 08/12/20 1454   Drainage Amount Small 08/12/20 1454   Drainage Description Serosanguinous 08/12/20 1454   Odor Mild 08/12/20 1454   Margins Attached edges 08/12/20 1454   Corine-wound Assessment Maceration;Calloused 08/12/20 1454   Non-staged Wound Description Not applicable 87/37/10 6977   [place]%Wound Bed 40 08/12/20 1454   Red%Wound Bed 0 08/12/20 1454   Yellow%Wound Bed 0 08/12/20 1454   Black%Wound Bed 0 08/12/20 1454   Purple%Wound Bed 0 08/12/20 1454   Other%Wound Bed 60 08/12/20 1454   Number of days: 26       Wound 07/17/20 Toe (Comment  which one) Right RT 2ND TOE BLISTER (Active)   Wound Image   08/12/20 1454   Dressing/Treatment Open to air 07/23/20 0850   Number of days: 26       Wound 07/17/20 Foot Right;Plantar RT PLANTAR FOOT BLISTER (Active)   Wound Image   07/17/20 1950   Dressing/Treatment Open to air 07/19/20 1032   Number of days: 26       Wound 08/12/20 Foot Left Wound 1, Diabetic Lan 2, Amputation site 4,5th toes (Active)   Wound Image     08/12/20 1454   Wound Diabetic Lan 2 08/12/20 1454   Dressing Status Old drainage 08/12/20 1454   Wound Cleansed Soap and water 08/12/20 1454   Wound Length (cm) 5 cm 08/12/20 1454   Wound Width (cm) 6.5 cm 08/12/20 1454   Wound Depth (cm) 0.9 cm 08/12/20 1454   Wound Surface Area (cm^2) 32.5 cm^2 08/12/20 1454   Wound Volume (cm^3) 29.25 cm^3 08/12/20 1454   Post-Procedure Length (cm) 5 cm 08/12/20 1551   Post-Procedure Width (cm) 6.5 cm 08/12/20 1551 Post-Procedure Depth (cm) 0.9 cm 08/12/20 1551   Post-Procedure Surface Area (cm^2) 32.5 cm^2 08/12/20 1551   Post-Procedure Volume (cm^3) 29.25 cm^3 08/12/20 1551   Distance Tunneling (cm) 0 cm 08/12/20 1454   Tunneling Position ___ O'Clock 0 08/12/20 1454   Undermining Starts ___ O'Clock 0 08/12/20 1454   Undermining Ends___ O'Clock 0 08/12/20 1454   Undermining Maxium Distance (cm) 0 08/12/20 1454   Wound Assessment Red;Pink;Slough; Yellow;Drainage 08/12/20 1454   Drainage Amount Moderate 08/12/20 1454   Drainage Description Serosanguinous 08/12/20 1454   Odor Strong 08/12/20 1454   Margins Attached edges 08/12/20 1454   Exposed structure Bone 08/12/20 1454   Corine-wound Assessment White; Maceration 08/12/20 1454   Non-staged Wound Description Not applicable 45/84/79 0235   [place]%Wound Bed 25 08/12/20 1454   Red%Wound Bed 65 08/12/20 1454   Yellow%Wound Bed 10 08/12/20 1454   Black%Wound Bed 0 08/12/20 1454   Purple%Wound Bed 0 08/12/20 1454   Other%Wound Bed 0 08/12/20 1454   Number of days: 0            S/P left 4th and 5th ray amputation          Plan:     Wound vac left foot  F/u 2 weeks    Problem List Items Addressed This Visit     None          Treatment Note please see attached Discharge Instructions    In my professional opinion this patient would benefit from HBO Therapy: No    Written patient dismissal instructions given to patient and signed by patient or POA.            Electronically signed by Tanika Anders MD on 8/12/2020 at 3:53 PM

## 2020-08-26 ENCOUNTER — HOSPITAL ENCOUNTER (OUTPATIENT)
Dept: WOUND CARE | Age: 55
Discharge: HOME | End: 2020-08-26
Payer: MEDICARE

## 2020-08-26 VITALS
RESPIRATION RATE: 16 BRPM | HEIGHT: 75 IN | SYSTOLIC BLOOD PRESSURE: 164 MMHG | HEART RATE: 92 BPM | BODY MASS INDEX: 26.49 KG/M2 | TEMPERATURE: 98 F | WEIGHT: 213 LBS | DIASTOLIC BLOOD PRESSURE: 88 MMHG

## 2020-08-26 PROCEDURE — 99024 POSTOP FOLLOW-UP VISIT: CPT | Performed by: SURGERY

## 2020-08-26 PROCEDURE — 99214 OFFICE O/P EST MOD 30 MIN: CPT

## 2020-08-26 ASSESSMENT — PAIN SCALES - GENERAL: PAINLEVEL_OUTOF10: 0

## 2020-08-26 NOTE — PROGRESS NOTES
[address]   Progress Note and Procedure Note      Kim Joya  MEDICAL RECORD NUMBER:  [id number]  AGE: 50 y.o. GENDER: male  : 1965  EPISODE DATE:  2020    Subjective:     Chief Complaint   Patient presents with    Wound Check     bilateral foot wounds, vac in place on left foot      No complaints of pain, fever, chills, drainage  Wound vac three times per week    HISTORY of PRESENT ILLNESS HPI     Kim Joya is a 50 y.o. male who presents today for wound/ulcer evaluation. History of Wound Context: s/p left 4, 5 toe amputation  Wound/Ulcer Pain Timing/Severity: none  Quality of pain: N/A  Severity:  0 / 10   Modifying Factors: None  Associated Signs/Symptoms: none          PAST MEDICAL HISTORY        Diagnosis Date    Anxiety     Diabetes ([facility] [address] [facility]     Schizophrenic disorder ([facility] [address]        PAST SURGICAL HISTORY    Past Surgical History:   Procedure Laterality Date    TOE AMPUTATION Left 2020    4TH AND 5TH OPEN TOE AMPUTATION performed by Dagoberto Mejia MD at [address]    History reviewed. No pertinent family history.     SOCIAL HISTORY    Social History     Tobacco Use    Smoking status: Former Smoker    Smokeless tobacco: Never Used    Tobacco comment: not smoking now stopped when went in hospital   Substance Use Topics    Alcohol use: Not Currently    Drug use: Never       ALLERGIES    Allergies   Allergen Reactions    Clozaril [Clozapine]     Haldol [Haloperidol]     Lithium     Navane [Thiothixene]        MEDICATIONS    Current Outpatient Medications on File Prior to Encounter   Medication Sig Dispense Refill    nicotine (NICODERM CQ) 21 MG/24HR Place 1 patch onto the skin daily 30 patch 0    ibuprofen (ADVIL;MOTRIN) 400 MG tablet Take 400 mg by mouth every 6 hours as needed for Pain or Fever      raNITIdine (ZANTAC) 150 MG tablet Take 150 mg by mouth 2 times daily as needed for Heartburn      polyethylene glycol (GLYCOLAX) 17 g packet Take 17 g by mouth daily as needed for Constipation      loperamide (IMODIUM) 2 MG capsule Take 2 mg by mouth 4 times daily as needed for Diarrhea      Dextromethorphan-guaiFENesin (ROBITUSSIN DM PO) Take by mouth as needed      risperiDONE microspheres ER (RISPERDAL CONSTA) 50 MG injection Inject 50 mg into the muscle every 14 days Due 7/22/2020      FLUoxetine (PROZAC) 40 MG capsule Take 40 mg by mouth nightly      OLANZapine zydis (ZYPREXA) 20 MG disintegrating tablet Take 20 mg by mouth nightly      insulin glargine (BASAGLAR KWIKPEN) 100 UNIT/ML injection pen Inject 15 Units into the skin nightly      hydrOXYzine (ATARAX) 25 MG tablet Take 25 mg by mouth 3 times daily as needed for Itching      risperiDONE (RISPERDAL) 4 MG tablet Take 4 mg by mouth nightly      simvastatin (ZOCOR) 20 MG tablet Take 20 mg by mouth nightly      traZODone (DESYREL) 50 MG tablet Take 50 mg by mouth nightly       No current facility-administered medications on file prior to encounter. REVIEW OF SYSTEMS    Pertinent items are noted in HPI.     Objective:      BP (!) 164/88   Pulse 92   Temp 98 °F (36.7 °C) (Temporal)   Resp 16   Ht 6' 3\" (1.905 m)   Wt 213 lb (96.6 kg)   BMI 26.62 kg/m²     Wt Readings from Last 3 Encounters:   08/26/20 213 lb (96.6 kg)   08/12/20 213 lb (96.6 kg)   07/20/20 213 lb 9.6 oz (96.9 kg)       PHYSICAL EXAM    General Appearance: well-developed and well-nourished, in no acute distress  Left lateral foot wound clean, healthy granulation tissue, no necrosis, new skin at wound margins      Assessment:      Patient Active Problem List   Diagnosis Code    Osteomyelitis of ankle or foot, acute, left (HCC) M86.172    Major depression, recurrent, chronic ([facility]) F33.9    Bipolar disorder ([facility] [address] F31.9    Psychiatric disorder F99    Leukocytosis D72.829    Skin ulcers of foot, bilateral ([facility] [address] L97.519, L97.529    PVD (peripheral vascular disease) ([facility] [address] I73.9    Tobacco abuse counseling Z71.6    Cigarette nicotine dependence with nicotine-induced disorder F17.219    Schizophrenia (HCC) F20.9        Procedure Note  Measurements:  Wound/Ulcer Descriptions are Pre Debridement except measurements:    Negative Pressure Wound Therapy Foot Left (Active)   Wound Type Diabetic foot ulcer 08/26/20 1440   Unit Type NISUS 08/26/20 1440   Dressing Type Black foam 08/26/20 1440   Cycle Continuous 08/26/20 1440   Target Pressure (mmHg) 125 08/26/20 1440   Canister changed? No 08/26/20 1440   Drainage Amount Small 08/26/20 1440   Drainage Description Serosanguinous 08/26/20 1440   Output (ml) 25 ml 08/26/20 1440   Wound Assessment Red;Pink 08/26/20 1440   Corine-wound Assessment Maceration; White 08/26/20 1440   Shape irregular 08/12/20 1454   Odor Strong 08/12/20 1454   Number of days: 14       Wound 07/17/20 Foot Plantar;Right Wound 2, Diabetic Lan 1, R. 1st Metatarsal Pad (Active)   Wound Image   08/26/20 1440   Wound Diabetic Lan 1 08/26/20 1440   Dressing Status Old drainage 08/26/20 1440   Dressing Changed Changed/New 08/12/20 1652   Wound Cleansed Soap and water 08/26/20 1440   Wound Length (cm) 0.2 cm 08/26/20 1440   Wound Width (cm) 0.2 cm 08/26/20 1440   Wound Depth (cm) 0.1 cm 08/26/20 1440   Wound Surface Area (cm^2) 0.04 cm^2 08/26/20 1440   Change in Wound Size % (l*w) 88.89 08/26/20 1440   Wound Volume (cm^3) 0 cm^3 08/26/20 1440   Wound Healing % 100 08/26/20 1440   Post-Procedure Length (cm) 0.2 cm 08/26/20 1525   Post-Procedure Width (cm) 0.2 cm 08/26/20 1525   Post-Procedure Depth (cm) 0.1 cm 08/26/20 1525   Post-Procedure Surface Area (cm^2) 0.04 cm^2 08/26/20 1525   Post-Procedure Volume (cm^3) 0 cm^3 08/26/20 1525   Distance Tunneling (cm) 0 cm 08/26/20 1440   Tunneling Position ___ O'Clock 0 08/26/20 1440   Undermining Starts ___ O'Clock 0 08/26/20 1440   Undermining Ends___ O'Clock 0 08/26/20 1440   Undermining Maxium Distance (cm) 0 08/26/20 1440   Wound Assessment Pink;Slough; Yellow;Drainage 08/26/20 1440   Drainage Amount Small 08/26/20 1440   Drainage Description Serosanguinous 08/26/20 1440   Odor None 08/26/20 1440   Margins Attached edges 08/26/20 1440   Corine-wound Assessment White;Calloused 08/26/20 1440   Non-staged Wound Description Not applicable 39/32/25 8191   [place]%Wound Bed 50 08/26/20 1440   Red%Wound Bed 50 08/26/20 1440   Yellow%Wound Bed 0 08/26/20 1440   Black%Wound Bed 0 08/26/20 1440   Purple%Wound Bed 0 08/26/20 1440   Other%Wound Bed 0 08/12/20 1454   Number of days: 39       Wound 07/17/20 Toe (Comment  which one) Plantar;Right Wound 3, Diabetic Lan 1,R. Great toe Plantar (Active)   Wound Image   08/26/20 1440   Wound Diabetic Lan 1 08/26/20 1440   Dressing Status Old drainage 08/26/20 1440   Dressing Changed Changed/New 08/12/20 1652   Wound Cleansed Soap and water 08/26/20 1440   Wound Length (cm) 0.5 cm 08/26/20 1440   Wound Width (cm) 0.3 cm 08/26/20 1440   Wound Depth (cm) 0.1 cm 08/26/20 1440   Wound Surface Area (cm^2) 0.15 cm^2 08/26/20 1440   Change in Wound Size % (l*w) 40 08/26/20 1440   Wound Volume (cm^3) 0.02 cm^3 08/26/20 1440   Wound Healing % 0 08/26/20 1440   Post-Procedure Length (cm) 0.5 cm 08/12/20 1551   Post-Procedure Width (cm) 0.5 cm 08/12/20 1551   Post-Procedure Depth (cm) 0.1 cm 08/12/20 1551   Post-Procedure Surface Area (cm^2) 0.25 cm^2 08/12/20 1551   Post-Procedure Volume (cm^3) 0.02 cm^3 08/12/20 1551   Distance Tunneling (cm) 0 cm 08/26/20 1440   Tunneling Position ___ O'Clock 0 08/26/20 1440   Undermining Starts ___ O'Clock 0 08/26/20 1440   Undermining Ends___ O'Clock 0 08/26/20 1440   Undermining Maxium Distance (cm) 0 08/26/20 1440   Wound Assessment Purple;Pink 08/26/20 1440   Drainage Amount Small 08/26/20 1440   Drainage Description Serosanguinous 08/26/20 1440   Odor None 08/26/20 1440   Margins Attached edges 08/26/20 1440   Corine-wound Assessment Maceration;Calloused 08/26/20 1440   Non-staged Wound Description Not applicable 99/14/13 6520   [place]%Wound Bed 50 08/26/20 1440   Red%Wound Bed 25 08/26/20 1440   Yellow%Wound Bed 0 08/26/20 1440   Black%Wound Bed 0 08/26/20 1440   Purple%Wound Bed 25 08/26/20 1440   Other%Wound Bed 0 08/26/20 1440   Number of days: 40       Wound 08/12/20 Foot Left Wound 1, Diabetic Lan 2, Amputation site 4,5th toes (Active)   Wound Image   08/26/20 1440   Wound Diabetic Lan 2 08/26/20 1440   Dressing Status Old drainage 08/26/20 1440   Dressing Changed Changed/New 08/12/20 1652   Dressing/Treatment Moist to dry 08/12/20 1652   Wound Cleansed Soap and water 08/26/20 1440   Wound Length (cm) 3.2 cm 08/26/20 1440   Wound Width (cm) 4 cm 08/26/20 1440   Wound Depth (cm) 0.3 cm 08/26/20 1440   Wound Surface Area (cm^2) 12.8 cm^2 08/26/20 1440   Change in Wound Size % (l*w) 60.62 08/26/20 1440   Wound Volume (cm^3) 3.84 cm^3 08/26/20 1440   Wound Healing % 87 08/26/20 1440   Post-Procedure Length (cm) 5 cm 08/12/20 1551   Post-Procedure Width (cm) 6.5 cm 08/12/20 1551   Post-Procedure Depth (cm) 0.9 cm 08/12/20 1551   Post-Procedure Surface Area (cm^2) 32.5 cm^2 08/12/20 1551   Post-Procedure Volume (cm^3) 29.25 cm^3 08/12/20 1551   Distance Tunneling (cm) 0 cm 08/26/20 1440   Tunneling Position ___ O'Clock 0 08/26/20 1440   Undermining Starts ___ O'Clock 0 08/26/20 1440   Undermining Ends___ O'Clock 0 08/26/20 1440   Undermining Maxium Distance (cm) 0 08/26/20 1440   Wound Assessment Red;Pink;Slough; Yellow;Drainage 08/26/20 1440   Drainage Amount Moderate 08/26/20 1440   Drainage Description Serosanguinous 08/26/20 1440   Odor Strong 08/26/20 1440   Margins Attached edges 08/26/20 1440   Exposed structure Bone 08/12/20 1454   Corine-wound Assessment White; Maceration 08/26/20 1440   Non-staged Wound Description Not applicable 74/38/75 0307   [place]%Wound Bed 10 08/26/20 1440   Red%Wound Bed 90 08/26/20 1440   Yellow%Wound Bed 0 08/26/20 1440   Black%Wound Bed 0 08/26/20 1440 Purple%Wound Bed 0 08/26/20 1440   Other%Wound Bed 0 08/12/20 1454   Number of days: 14            Healing left 4th and 5th toe amputation, metatarsal level      Plan:     Continue wound vac  F/U 2 weeks. Problem List Items Addressed This Visit     None          Treatment Note please see attached Discharge Instructions    In my professional opinion this patient would benefit from HBO Therapy: No    Written patient dismissal instructions given to patient and signed by patient or POA.            Electronically signed by Jeferson Hampton MD on 8/26/2020 at 3:26 PM

## 2020-08-27 NOTE — PLAN OF CARE
Problem: Wound:  Goal: Will show signs of wound healing; wound closure and no evidence of infection  Description: Will show signs of wound healing; wound closure and no evidence of infection  8/26/2020 2310 by Hussein Shields RN  Outcome: Ongoing  8/26/2020 1526 by Lisa Lyn RN  Outcome: Ongoing     Problem: Falls - Risk of:  Goal: Will remain free from falls  Description: Will remain free from falls  8/26/2020 2310 by Hussein Shields RN  Outcome: Ongoing  8/26/2020 1526 by Lisa Lyn RN  Outcome: Ongoing     Problem: Blood Glucose:  Goal: Ability to maintain appropriate glucose levels will improve  Description: Ability to maintain appropriate glucose levels will improve  8/26/2020 2310 by Hussein Shields RN  Outcome: Ongoing  8/26/2020 1526 by Lisa Lyn RN  Outcome: Ongoing

## 2020-09-09 ENCOUNTER — HOSPITAL ENCOUNTER (OUTPATIENT)
Dept: WOUND CARE | Age: 55
Discharge: HOME | End: 2020-09-09
Payer: COMMERCIAL

## 2020-09-09 VITALS
RESPIRATION RATE: 16 BRPM | HEIGHT: 75 IN | WEIGHT: 213 LBS | BODY MASS INDEX: 26.49 KG/M2 | TEMPERATURE: 98.3 F | DIASTOLIC BLOOD PRESSURE: 82 MMHG | HEART RATE: 102 BPM | SYSTOLIC BLOOD PRESSURE: 153 MMHG

## 2020-09-09 PROCEDURE — 97597 DBRDMT OPN WND 1ST 20 CM/<: CPT

## 2020-09-09 PROCEDURE — 97597 DBRDMT OPN WND 1ST 20 CM/<: CPT | Performed by: SURGERY

## 2020-09-09 ASSESSMENT — PAIN SCALES - GENERAL: PAINLEVEL_OUTOF10: 0

## 2020-09-09 NOTE — PROGRESS NOTES
[address]   Progress Note and Procedure Note      Owen Joe  MEDICAL RECORD NUMBER:  [id number]  AGE: 50 y.o. GENDER: male  : 1965  EPISODE DATE:  2020    Subjective:     Chief Complaint   Patient presents with    Wound Check     follow up         HISTORY of PRESENT ILLNESS HPI    He is doing well. He has no complaints. He still staying at the rehab facility and they are taking care of his wound on the left foot and his ulcers on the right foot. Owen Joe is a 50 y.o. male who presents today for wound/ulcer evaluation. History of Wound Context: s/p left 4-5 toe amp  Wound/Ulcer Pain Timing/Severity: none  Quality of pain: N/A  Severity:  0 / 10   Modifying Factors: None  Associated Signs/Symptoms: none    Ulcer Identification:  Ulcer Type: burn  Contributing Factors: chronic pressure          PAST MEDICAL HISTORY        Diagnosis Date    Anxiety     Diabetes ([facility] [facility]     Schizophrenic disorder ([facility] [address]        PAST SURGICAL HISTORY    Past Surgical History:   Procedure Laterality Date    TOE AMPUTATION Left 2020    4TH AND 5TH OPEN TOE AMPUTATION performed by Ngoc Jones MD at [facility] [address]    History reviewed. No pertinent family history.     SOCIAL HISTORY    Social History     Tobacco Use    Smoking status: Former Smoker    Smokeless tobacco: Never Used    Tobacco comment: not smoking now stopped when went in hospital   Substance Use Topics    Alcohol use: Not Currently    Drug use: Never       ALLERGIES    Allergies   Allergen Reactions    Clozaril [Clozapine]     Haldol [Haloperidol]     Lithium     Navane [Thiothixene]        MEDICATIONS    Current Outpatient Medications on File Prior to Encounter   Medication Sig Dispense Refill    ibuprofen (ADVIL;MOTRIN) 400 MG tablet Take 400 mg by mouth every 6 hours as needed for Pain or Fever      raNITIdine (ZANTAC) 150 MG tablet Take 150 mg by mouth 2 times daily as needed for Heartburn      polyethylene glycol (GLYCOLAX) 17 g packet Take 17 g by mouth daily as needed for Constipation      loperamide (IMODIUM) 2 MG capsule Take 2 mg by mouth 4 times daily as needed for Diarrhea      Dextromethorphan-guaiFENesin (ROBITUSSIN DM PO) Take by mouth as needed      risperiDONE microspheres ER (RISPERDAL CONSTA) 50 MG injection Inject 50 mg into the muscle every 14 days Due 7/22/2020      FLUoxetine (PROZAC) 40 MG capsule Take 40 mg by mouth nightly      OLANZapine zydis (ZYPREXA) 20 MG disintegrating tablet Take 20 mg by mouth nightly      insulin glargine (BASAGLAR KWIKPEN) 100 UNIT/ML injection pen Inject 15 Units into the skin nightly      hydrOXYzine (ATARAX) 25 MG tablet Take 25 mg by mouth 3 times daily as needed for Itching      risperiDONE (RISPERDAL) 4 MG tablet Take 4 mg by mouth nightly      simvastatin (ZOCOR) 20 MG tablet Take 20 mg by mouth nightly      traZODone (DESYREL) 50 MG tablet Take 50 mg by mouth nightly      nicotine (NICODERM CQ) 21 MG/24HR Place 1 patch onto the skin daily 30 patch 0     No current facility-administered medications on file prior to encounter. REVIEW OF SYSTEMS    Pertinent items are noted in HPI. Objective:      BP (!) 153/82   Pulse 102   Temp 98.3 °F (36.8 °C) (Temporal)   Resp 16   Ht 6' 3\" (1.905 m)   Wt 213 lb (96.6 kg)   BMI 26.62 kg/m²     Wt Readings from Last 3 Encounters:   09/09/20 213 lb (96.6 kg)   08/26/20 213 lb (96.6 kg)   08/12/20 213 lb (96.6 kg)       PHYSICAL EXAM    General Appearance: alert and oriented to person, place and time, well-developed and well-nourished, in no acute distress  See wound pictures. There is no cellulitis of the left open foot wound. There is no necrosis. It is much smaller than the last visit. There is no tunneling. There is no significant edema. Regarding his right plantar surface great toe and metatarsal head ulcers, there is some callus over nearly healed ulcers. 2, Diabetic Lan 1, R. 1st Metatarsal Pad (Active)   Wound Image   09/09/20 1428   Wound Diabetic Lan 1 09/09/20 1428   Dressing Status Old drainage 09/09/20 1428   Dressing Changed Changed/New 08/12/20 1652   Wound Cleansed Soap and water 09/09/20 1428   Wound Length (cm) 0.2 cm 09/09/20 1428   Wound Width (cm) 0.2 cm 09/09/20 1428   Wound Depth (cm) 0.4 cm 09/09/20 1428   Wound Surface Area (cm^2) 0.04 cm^2 09/09/20 1428   Change in Wound Size % (l*w) 88.89 09/09/20 1428   Wound Volume (cm^3) 0.02 cm^3 09/09/20 1428   Wound Healing % 50 09/09/20 1428   Post-Procedure Length (cm) 0.2 cm 09/09/20 1514   Post-Procedure Width (cm) 0.3 cm 09/09/20 1514   Post-Procedure Depth (cm) 0.3 cm 09/09/20 1514   Post-Procedure Surface Area (cm^2) 0.06 cm^2 09/09/20 1514   Post-Procedure Volume (cm^3) 0.02 cm^3 09/09/20 1514   Distance Tunneling (cm) 0 cm 09/09/20 1428   Tunneling Position ___ O'Clock 0 09/09/20 1428   Undermining Starts ___ O'Clock 0 09/09/20 1428   Undermining Ends___ O'Clock 0 09/09/20 1428   Undermining Maxium Distance (cm) 0 09/09/20 1428   Wound Assessment Pink;Slough; Yellow;Drainage 09/09/20 1428   Drainage Amount Small 09/09/20 1428   Drainage Description Serosanguinous 09/09/20 1428   Odor None 09/09/20 1428   Margins Attached edges 09/09/20 1428   Corine-wound Assessment White;Calloused 09/09/20 1428   Non-staged Wound Description Not applicable 57/54/90 3177   [place]%Wound Bed 50 09/09/20 1428   Red%Wound Bed 50 09/09/20 1428   Yellow%Wound Bed 0 09/09/20 1428   Black%Wound Bed 0 09/09/20 1428   Purple%Wound Bed 0 09/09/20 1428   Other%Wound Bed 0 08/12/20 1454   Number of days: 53       Wound 07/17/20 Toe (Comment  which one) Plantar;Right Wound 3, Diabetic Lan 1,R. Great toe Plantar (Active)   Wound Image   09/09/20 1428   Wound Diabetic Lan 1 09/09/20 1428   Dressing Status Old drainage 09/09/20 1428   Dressing Changed Changed/New 08/12/20 1652   Wound Cleansed Soap and water 09/09/20 1428 Wound Length (cm) 0.2 cm 09/09/20 1514   Wound Width (cm) 0.2 cm 09/09/20 1514   Wound Depth (cm) 0.1 cm 09/09/20 1514   Wound Surface Area (cm^2) 0.04 cm^2 09/09/20 1514   Change in Wound Size % (l*w) 84 09/09/20 1514   Wound Volume (cm^3) 0 cm^3 09/09/20 1514   Wound Healing % 100 09/09/20 1514   Post-Procedure Length (cm) 0.5 cm 08/12/20 1551   Post-Procedure Width (cm) 0.5 cm 08/12/20 1551   Post-Procedure Depth (cm) 0.1 cm 08/12/20 1551   Post-Procedure Surface Area (cm^2) 0.25 cm^2 08/12/20 1551   Post-Procedure Volume (cm^3) 0.02 cm^3 08/12/20 1551   Distance Tunneling (cm) 0 cm 09/09/20 1428   Tunneling Position ___ O'Clock 0 09/09/20 1428   Undermining Starts ___ O'Clock 0 09/09/20 1428   Undermining Ends___ O'Clock 0 09/09/20 1428   Undermining Maxium Distance (cm) 0 09/09/20 1428   Wound Assessment Purple;Pink 09/09/20 1428   Drainage Amount Small 09/09/20 1428   Drainage Description Serosanguinous 09/09/20 1428   Odor None 09/09/20 1428   Margins Attached edges 09/09/20 1428   Corine-wound Assessment Maceration;Calloused 08/26/20 1440   Non-staged Wound Description Not applicable 92/91/68 0127   [place]%Wound Bed 50 09/09/20 1428   Red%Wound Bed 25 09/09/20 1428   Yellow%Wound Bed 0 09/09/20 1428   Black%Wound Bed 0 09/09/20 1428   Purple%Wound Bed 25 09/09/20 1428   Other%Wound Bed 0 08/26/20 1440   Number of days: 54       Wound 08/12/20 Foot Left Wound 1, Diabetic Lan 2, Amputation site 4,5th toes (Active)   Wound Image   09/09/20 1428   Wound Diabetic Lan 1 09/09/20 1428   Dressing Status Old drainage 09/09/20 1428   Dressing Changed Changed/New 08/26/20 1559   Dressing/Treatment Moist to dry 08/26/20 1559   Wound Cleansed Soap and water 09/09/20 1428   Wound Length (cm) 2.1 cm 09/09/20 1428   Wound Width (cm) 2.6 cm 09/09/20 1428   Wound Depth (cm) 0.1 cm 09/09/20 1428   Wound Surface Area (cm^2) 5.46 cm^2 09/09/20 1428   Change in Wound Size % (l*w) 83.2 09/09/20 1428   Wound Volume (cm^3) 0.55 cm^3 09/09/20 1428   Wound Healing % 98 09/09/20 1428   Post-Procedure Length (cm) 5 cm 08/12/20 1551   Post-Procedure Width (cm) 6.5 cm 08/12/20 1551   Post-Procedure Depth (cm) 0.9 cm 08/12/20 1551   Post-Procedure Surface Area (cm^2) 32.5 cm^2 08/12/20 1551   Post-Procedure Volume (cm^3) 29.25 cm^3 08/12/20 1551   Distance Tunneling (cm) 0 cm 09/09/20 1428   Tunneling Position ___ O'Clock 0 09/09/20 1428   Undermining Starts ___ O'Clock 0 09/09/20 1428   Undermining Ends___ O'Clock 0 09/09/20 1428   Undermining Maxium Distance (cm) 0 09/09/20 1428   Wound Assessment Red;Pink;Slough; Yellow;Drainage 09/09/20 1428   Drainage Amount Moderate 09/09/20 1428   Drainage Description Serosanguinous 09/09/20 1428   Odor Strong 09/09/20 1428   Margins Attached edges 09/09/20 1428   Exposed structure Bone 08/12/20 1454   Corine-wound Assessment White; Maceration 09/09/20 1428   Non-staged Wound Description Not applicable 11/89/21 3081   Pink%Wound Bed 10 09/09/20 1428   Red%Wound Bed 90 09/09/20 1428   Yellow%Wound Bed 0 09/09/20 1428   Black%Wound Bed 0 09/09/20 1428   Purple%Wound Bed 0 09/09/20 1428   Other%Wound Bed 0 08/12/20 1454   Number of days: 28            Percent of Wound/Ulcer Debrided: 10%    Total Surface Area Debrided:  1 sq cm           Estimated Blood Loss:  Minimal    Hemostasis Achieved:  by pressure    Procedural Pain:  0  / 10     Post Procedural Pain:  0 / 10     Response to treatment:  Well tolerated by patient. His left lateral foot open wound is healing nicely. There is no exposed bone. There is no necrosis. All the granulation tissue is healthy (please see media)    His right plantar surface foot ulcers are also healing nicely. Today, I debrided callus and some epidermis. Plan:       Continue current wound care and offloading. I will see him back in 2 weeks.     Problem List Items Addressed This Visit     None          Treatment Note please see attached Discharge Instructions    In my professional opinion this patient would benefit from HBO Therapy: No    Written patient dismissal instructions given to patient and signed by patient or POA.            Electronically signed by Sherwin Wan MD on 9/9/2020 at 4:05 PM

## 2020-09-14 NOTE — PROGRESS NOTES
Patient's nurse, Kirit Pelayo, has contacted the clinic today to report that the patient has began taking his wound vac dressing off, unplugging the wound vac, or refusing to allow the wound vac dressing to be changed. The patient's nurse reports that he has schizophrenia, bipolar and other psychiatric disorders that cause him to become enraged when asked to do something he does not want to do. I have instructed the nurse to begin the alternative dressing orders as follows:  Left foot:   Wash with soap and water. Apply normal saline moistened gauze to wound bed. Cover with gauze. Secure with roll gauze and medipore tape. Change twice daily. Electronically signed by Nilesh Harry RN on 9/14/2020 at 11:36 AM  shyla  .

## 2020-09-30 ENCOUNTER — HOSPITAL ENCOUNTER (OUTPATIENT)
Dept: WOUND CARE | Age: 55
Discharge: HOME | End: 2020-09-30
Payer: MEDICARE

## 2020-09-30 VITALS
SYSTOLIC BLOOD PRESSURE: 142 MMHG | BODY MASS INDEX: 28.48 KG/M2 | RESPIRATION RATE: 20 BRPM | WEIGHT: 229.06 LBS | HEART RATE: 90 BPM | HEIGHT: 75 IN | DIASTOLIC BLOOD PRESSURE: 84 MMHG | TEMPERATURE: 97.5 F

## 2020-09-30 PROCEDURE — 97597 DBRDMT OPN WND 1ST 20 CM/<: CPT

## 2020-09-30 PROCEDURE — 97597 DBRDMT OPN WND 1ST 20 CM/<: CPT | Performed by: SURGERY

## 2020-09-30 RX ORDER — ACETAMINOPHEN 325 MG/1
650 TABLET ORAL EVERY 4 HOURS PRN
COMMUNITY

## 2020-09-30 RX ORDER — RISPERIDONE 2 MG/1
2 TABLET, FILM COATED ORAL 2 TIMES DAILY
COMMUNITY

## 2020-09-30 RX ORDER — FAMOTIDINE 20 MG/1
20 TABLET, FILM COATED ORAL DAILY
COMMUNITY

## 2020-09-30 RX ORDER — ACETAMINOPHEN 650 MG/1
650 SUPPOSITORY RECTAL EVERY 6 HOURS PRN
COMMUNITY

## 2020-09-30 NOTE — PROGRESS NOTES
[address]   Progress Note and Procedure Note      Vanessa Parr  MEDICAL RECORD NUMBER:  [id number]  AGE: 50 y.o. GENDER: male  : 1965  EPISODE DATE:  2020    Subjective:     Chief Complaint   Patient presents with    Wound Check     Pt asking how much longer till he can go home. HISTORY of PRESENT ILLNESS HPI      No complaints  Still at [facility]     Vanessa Parr is a 50 y.o. male who presents today for wound/ulcer evaluation. History of Wound Context: left great toe metatarsal amputation  Wound/Ulcer Pain Timing/Severity: none  Quality of pain: N/A  Severity:  0 / 10   Modifying Factors: None  Associated Signs/Symptoms: none    Ulcer Identification:  Ulcer Type: pressure  Contributing Factors: smoking    Wound: s/p left great toe metatarsal amputation        PAST MEDICAL HISTORY        Diagnosis Date    Anxiety     Diabetes ([facility] [address]     Schizophrenic disorder ([facility] [address]        PAST SURGICAL HISTORY    Past Surgical History:   Procedure Laterality Date    TOE AMPUTATION Left 2020    4TH AND 5TH OPEN TOE AMPUTATION performed by Eddie Santana MD at [facility] [address]    History reviewed. No pertinent family history.     SOCIAL HISTORY    Social History     Tobacco Use    Smoking status: Former Smoker    Smokeless tobacco: Never Used    Tobacco comment: not smoking now stopped when went in hospital   Substance Use Topics    Alcohol use: Not Currently    Drug use: Never       ALLERGIES    Allergies   Allergen Reactions    Clozaril [Clozapine]     Haldol [Haloperidol]     Lithium     Navane [Thiothixene]        MEDICATIONS    Current Outpatient Medications on File Prior to Encounter   Medication Sig Dispense Refill    famotidine (PEPCID) 20 MG tablet Take 20 mg by mouth daily      risperiDONE (RISPERDAL) 2 MG tablet Take 2 mg by mouth daily      FLUoxetine (PROZAC) 40 MG capsule Take 40 mg by mouth nightly      OLANZapine zydis (ZYPREXA) 20 MG disintegrating tablet Take 20 mg by mouth nightly      insulin glargine (BASAGLAR KWIKPEN) 100 UNIT/ML injection pen Inject 15 Units into the skin nightly      risperiDONE (RISPERDAL) 4 MG tablet Take 4 mg by mouth nightly      simvastatin (ZOCOR) 20 MG tablet Take 20 mg by mouth nightly      acetaminophen (TYLENOL) 650 MG suppository Place 650 mg rectally every 6 hours as needed for Fever or Pain      acetaminophen (TYLENOL) 325 MG tablet Take 650 mg by mouth every 4 hours as needed for Pain      ibuprofen (ADVIL;MOTRIN) 400 MG tablet Take 400 mg by mouth every 6 hours as needed for Pain or Fever      loperamide (IMODIUM) 2 MG capsule Take 2 mg by mouth 4 times daily as needed for Diarrhea      risperiDONE microspheres ER (RISPERDAL CONSTA) 50 MG injection Inject 50 mg into the muscle every 14 days Due 7/22/2020      hydrOXYzine (ATARAX) 25 MG tablet Take 25 mg by mouth 3 times daily as needed for Itching      traZODone (DESYREL) 50 MG tablet Take 50 mg by mouth nightly       No current facility-administered medications on file prior to encounter. REVIEW OF SYSTEMS    Pertinent items are noted in HPI.     Objective:      BP (!) 142/84   Pulse 90   Temp 97.5 °F (36.4 °C) (Temporal)   Resp 20   Ht 6' 3\" (1.905 m)   Wt 229 lb 1 oz (103.9 kg)   BMI 28.63 kg/m²     Wt Readings from Last 3 Encounters:   09/30/20 229 lb 1 oz (103.9 kg)   09/09/20 213 lb (96.6 kg)   08/26/20 213 lb (96.6 kg)       PHYSICAL EXAM    General Appearance: alert and oriented to person, place and time, well-developed and well-nourished, in no acute distress  See wound pics  No cellulitis  Callus surrounding right great toe ulcers  Granulation good bilaterally      Assessment:      Patient Active Problem List   Diagnosis Code    Osteomyelitis of ankle or foot, acute, left ([facility]) M86.172    Major depression, recurrent, chronic ([facility]) F33.9    Bipolar disorder ([facility] [address] F31.9    Psychiatric disorder F99    Leukocytosis D72.829    Skin ulcers of foot, bilateral ([facility]) L97.519, L97.529    PVD (peripheral vascular disease) ([facility]) I73.9    Tobacco abuse counseling Z71.6    Cigarette nicotine dependence with nicotine-induced disorder F17.219    Schizophrenia ([facility] [address] F20.9        Procedure Note  Indications:  Based on my examination of this patient's wound(s)/ulcer(s) today, debridement is required to promote healing and evaluate the wound base. Performed by: Mickey Franz MD    Consent obtained:  Yes    Time out taken:  Yes    Pain Control:         Debridement:Non-excisional Debridement    Using #15 blade scalpel the wound(s)/ulcer(s) was/were sharply debrided down through and including the removal of epidermis and dermis. Devitalized Tissue Debrided:  callus    Pre Debridement Measurements:  Are located in the Wound/Ulcer Documentation Flow Sheet    Wound/Ulcer #: 1    Post Debridement Measurements:  Wound/Ulcer Descriptions are Pre Debridement except measurements:    Negative Pressure Wound Therapy Foot Left (Active)   Wound Type Diabetic foot ulcer 08/26/20 1440   Unit Type NISUS 08/26/20 1440   Dressing Type Black foam 08/26/20 1440   Cycle Continuous 08/26/20 1440   Target Pressure (mmHg) 125 08/26/20 1440   Canister changed? No 08/26/20 1440   Drainage Amount Small 08/26/20 1440   Drainage Description Serosanguinous 08/26/20 1440   Output (ml) 25 ml 08/26/20 1440   Wound Assessment Red;Pink 08/26/20 1440   Corine-wound Assessment Maceration; White 08/26/20 1440   Shape irregular 08/12/20 1454   Odor Strong 08/12/20 1454   Number of days: 48       Wound 07/17/20 Foot Plantar;Right Wound 2, Diabetic Lan 1, R. 1st Metatarsal Pad (Active)   Wound Image    09/30/20 1334   Wound Diabetic Lan 1 09/30/20 1334   Dressing Status Old drainage 09/09/20 1428   Dressing Changed Changed/New 08/12/20 1652   Wound Cleansed Soap and water 09/30/20 1334   Wound Length (cm) 1 cm 09/30/20 1334   Wound Width (cm) 0.4 cm 09/30/20 1334 Wound Depth (cm) 0.2 cm 09/30/20 1334   Wound Surface Area (cm^2) 0.4 cm^2 09/30/20 1334   Change in Wound Size % (l*w) -11.11 09/30/20 1334   Wound Volume (cm^3) 0.08 cm^3 09/30/20 1334   Wound Healing % -100 09/30/20 1334   Post-Procedure Length (cm) 0.2 cm 09/09/20 1514   Post-Procedure Width (cm) 0.3 cm 09/09/20 1514   Post-Procedure Depth (cm) 0.3 cm 09/09/20 1514   Post-Procedure Surface Area (cm^2) 0.06 cm^2 09/09/20 1514   Post-Procedure Volume (cm^3) 0.02 cm^3 09/09/20 1514   Distance Tunneling (cm) 0 cm 09/30/20 1334   Tunneling Position ___ O'Clock 0 09/30/20 1334   Undermining Starts ___ O'Clock 0 09/30/20 1334   Undermining Ends___ O'Clock 0 09/30/20 1334   Undermining Maxium Distance (cm) 0 09/30/20 1334   Wound Assessment Red;Drainage;Slough; Yellow 09/30/20 1334   Drainage Amount Scant 09/30/20 1334   Drainage Description Serosanguinous 09/30/20 1334   Odor None 09/30/20 1334   Margins Attached edges 09/30/20 1334   Corine-wound Assessment Calloused 09/30/20 1334   Non-staged Wound Description Not applicable 88/30/33 3432   [place]%Wound Bed 0 09/30/20 1334   Red%Wound Bed 95 09/30/20 1334   Yellow%Wound Bed 5 09/30/20 1334   Black%Wound Bed 0 09/30/20 1334   Purple%Wound Bed 0 09/30/20 1334   Other%Wound Bed 0 09/30/20 1334   Number of days: 74       Wound 07/17/20 Toe (Comment  which one) Plantar;Right Wound 3, Diabetic Lan 1,R. Great toe Plantar (Active)   Wound Image   09/30/20 1334   Wound Diabetic Lan 1 09/30/20 1334   Dressing Status Old drainage 09/30/20 1334   Dressing Changed Changed/New 08/12/20 1652   Wound Cleansed Soap and water 09/30/20 1334   Wound Length (cm) 0.3 cm 09/30/20 1334   Wound Width (cm) 0.3 cm 09/30/20 1334   Wound Depth (cm) 0.3 cm 09/30/20 1334   Wound Surface Area (cm^2) 0.09 cm^2 09/30/20 1334   Change in Wound Size % (l*w) 64 09/30/20 1334   Wound Volume (cm^3) 0.03 cm^3 09/30/20 1334   Wound Healing % -50 09/30/20 1334   Post-Procedure Length (cm) 0.5 cm 08/12/20 (cm) 0 cm 09/30/20 1334   Tunneling Position ___ O'Clock 0 09/30/20 1334   Undermining Starts ___ O'Clock 0 09/30/20 1334   Undermining Ends___ O'Clock 0 09/30/20 1334   Undermining Maxium Distance (cm) 0 09/30/20 1334   Wound Assessment Red;Black;Slough; Yellow;Drainage 09/30/20 1334   Drainage Amount Small 09/30/20 1334   Drainage Description Serosanguinous 09/30/20 1334   Odor None 09/30/20 1334   Margins Attached edges 09/30/20 1334   Exposed structure Bone 08/12/20 1454   Corine-wound Assessment Dry 09/30/20 1334   Non-staged Wound Description Not applicable 72/61/94 9193   [place]%Wound Bed 0 09/30/20 1334   Red%Wound Bed 95 09/30/20 1334   Yellow%Wound Bed 0 09/30/20 1334   Black%Wound Bed 5 09/30/20 1334   Purple%Wound Bed 0 09/30/20 1334   Other%Wound Bed 0 09/30/20 1334   Number of days: 48            Percent of Wound/Ulcer Debrided: 50%    Total Surface Area Debrided:  1 sq cm           Estimated Blood Loss:  Minimal    Hemostasis Achieved:  by silver nitrate stick    Procedural Pain:  0  / 10     Post Procedural Pain:  0 / 10     Response to treatment:  Well tolerated by patient. Doing well with nearly healed bilateral foot ulcers    Plan:       Offload   No need for vac any longer  F/U 3 weeks  Problem List Items Addressed This Visit     None          Treatment Note please see attached Discharge Instructions    In my professional opinion this patient would benefit from HBO Therapy: No    Written patient dismissal instructions given to patient and signed by patient or POA.            Electronically signed by Osiel Valadez MD on 9/30/2020 at 2:05 PM

## 2020-10-21 ENCOUNTER — HOSPITAL ENCOUNTER (OUTPATIENT)
Dept: WOUND CARE | Age: 55
Discharge: HOME | End: 2020-10-21
Payer: MEDICARE

## 2020-10-21 VITALS
BODY MASS INDEX: 28.47 KG/M2 | RESPIRATION RATE: 16 BRPM | TEMPERATURE: 97.8 F | HEART RATE: 90 BPM | SYSTOLIC BLOOD PRESSURE: 115 MMHG | WEIGHT: 229 LBS | DIASTOLIC BLOOD PRESSURE: 79 MMHG | HEIGHT: 75 IN

## 2020-10-21 PROCEDURE — 97597 DBRDMT OPN WND 1ST 20 CM/<: CPT | Performed by: SURGERY

## 2020-10-21 PROCEDURE — 97597 DBRDMT OPN WND 1ST 20 CM/<: CPT

## 2020-10-21 RX ORDER — POLYETHYLENE GLYCOL 3350 17 G/17G
17 POWDER, FOR SOLUTION ORAL DAILY PRN
COMMUNITY

## 2020-10-21 ASSESSMENT — PAIN SCALES - GENERAL: PAINLEVEL_OUTOF10: 0

## 2020-10-21 NOTE — PROGRESS NOTES
20 mg by mouth daily      risperiDONE (RISPERDAL) 2 MG tablet Take 2 mg by mouth daily      ibuprofen (ADVIL;MOTRIN) 400 MG tablet Take 400 mg by mouth every 6 hours as needed for Pain or Fever      FLUoxetine (PROZAC) 40 MG capsule Take 40 mg by mouth nightly      OLANZapine zydis (ZYPREXA) 20 MG disintegrating tablet Take 20 mg by mouth nightly      insulin glargine (BASAGLAR KWIKPEN) 100 UNIT/ML injection pen Inject 15 Units into the skin nightly      hydrOXYzine (ATARAX) 25 MG tablet Take 25 mg by mouth 3 times daily as needed for Itching      risperiDONE (RISPERDAL) 4 MG tablet Take 4 mg by mouth nightly      simvastatin (ZOCOR) 20 MG tablet Take 20 mg by mouth nightly      loperamide (IMODIUM) 2 MG capsule Take 2 mg by mouth 4 times daily as needed for Diarrhea       No current facility-administered medications on file prior to encounter. REVIEW OF SYSTEMS    Pertinent items are noted in HPI.     Objective:      /79   Pulse 90   Temp 97.8 °F (36.6 °C) (Temporal)   Resp 16   Ht 6' 3\" (1.905 m)   Wt 229 lb (103.9 kg)   BMI 28.62 kg/m²     Wt Readings from Last 3 Encounters:   10/21/20 229 lb (103.9 kg)   09/30/20 229 lb 1 oz (103.9 kg)   09/09/20 213 lb (96.6 kg)       PHYSICAL EXAM    General Appearance: alert and oriented to person, place and time, well-developed and well-nourished, in no acute distress      Assessment:      Patient Active Problem List   Diagnosis Code    Osteomyelitis of ankle or foot, acute, left (HCC) M86.172    Major depression, recurrent, chronic ([facility]) F33.9    Bipolar disorder ([address] F31.9    Psychiatric disorder F99    Leukocytosis D72.829    Skin ulcers of foot, bilateral ([address] L97.519, L97.529    PVD (peripheral vascular disease) ([address] I73.9    Tobacco abuse counseling Z71.6    Cigarette nicotine dependence with nicotine-induced disorder F17.219    Schizophrenia ([address] F20.9        Procedure Note  Indications:  Based on my examination of this patient's wound(s)/ulcer(s) today, debridement is required to promote healing and evaluate the wound base. Performed by: Erin Camargo MD    Consent obtained:  Yes    Time out taken:  Yes    Pain Control:         Debridement:Excisional Debridement    Using #15 blade scalpel the wound(s)/ulcer(s) was/were sharply debrided down through and including the removal of epidermis and dermis.         Devitalized Tissue Debrided:  fibrin and callus    Pre Debridement Measurements:  Are located in the Wound/Ulcer Documentation Flow Sheet    Wound/Ulcer #: 2    Post Debridement Measurements:  Wound/Ulcer Descriptions are Pre Debridement except measurements:      Percent of Wound/Ulcer Debrided: 50%    Total Surface Area Debrided:  1 sq cm     Wound 07/17/20 Foot Plantar;Right Wound 2, Diabetic Lan 1, R. 1st Metatarsal Pad (Active)   Wound Image   10/21/20 1311   Wound Etiology Diabetic Lan 1 10/21/20 1311   Wound Cleansed Soap and water 10/21/20 1311   Wound Length (cm) 1 cm 10/21/20 1311   Wound Width (cm) 0.3 cm 10/21/20 1311   Wound Depth (cm) 0.1 cm 10/21/20 1311   Wound Surface Area (cm^2) 0.3 cm^2 10/21/20 1311   Change in Wound Size % (l*w) 16.67 10/21/20 1311   Wound Volume (cm^3) 0.03 cm^3 10/21/20 1311   Wound Healing % 25 10/21/20 1311   Post-Procedure Length (cm) 2 cm 10/21/20 1322   Post-Procedure Width (cm) 1 cm 10/21/20 1322   Post-Procedure Depth (cm) 0.1 cm 10/21/20 1322   Post-Procedure Surface Area (cm^2) 2 cm^2 10/21/20 1322   Post-Procedure Volume (cm^3) 0.2 cm^3 10/21/20 1322   Distance Tunneling (cm) 0 cm 10/21/20 1311   Tunneling Position ___ O'Clock 0 10/21/20 1311   Undermining Starts ___ O'Clock 0 10/21/20 1311   Undermining Ends___ O'Clock 0 10/21/20 1311   Undermining Maxium Distance (cm) 0 10/21/20 1311   Wound Assessment Slough;Pale granulation tissue 10/21/20 1311   Drainage Amount Scant 10/21/20 1311   Drainage Description Serosanguinous 10/21/20 1311   Odor None 10/21/20 1311 Corine-wound Assessment Intact 10/21/20 1311   Margins Attached edges 10/21/20 1311   Wound Thickness Description not for Pressure Injury Not applicable 16/53/19 9169   Number of days: 95       Wound 07/17/20 Toe (Comment  which one) Plantar;Right Wound 3, Diabetic Lan 1,R. Great toe Plantar (Active)   Wound Image   09/30/20 1334   Wound Etiology Diabetic Lan 1 10/21/20 1311   Dressing Status Old drainage noted 10/21/20 1311   Wound Cleansed Soap and water 10/21/20 1311   Wound Length (cm) 0.3 cm 10/21/20 1311   Wound Width (cm) 0.3 cm 10/21/20 1311   Wound Depth (cm) 0.3 cm 10/21/20 1311   Wound Surface Area (cm^2) 0.09 cm^2 10/21/20 1311   Change in Wound Size % (l*w) 64 10/21/20 1311   Wound Volume (cm^3) 0.03 cm^3 10/21/20 1311   Wound Healing % -50 10/21/20 1311   Post-Procedure Length (cm) 0.8 cm 10/21/20 1322   Post-Procedure Width (cm) 0.5 cm 10/21/20 1322   Post-Procedure Depth (cm) 0.3 cm 10/21/20 1322   Post-Procedure Surface Area (cm^2) 0.4 cm^2 10/21/20 1322   Post-Procedure Volume (cm^3) 0.12 cm^3 10/21/20 1322   Distance Tunneling (cm) 0 cm 10/21/20 1311   Tunneling Position ___ O'Clock 0 10/21/20 1311   Undermining Starts ___ O'Clock 0 10/21/20 1311   Undermining Ends___ O'Clock 0 10/21/20 1311   Undermining Maxium Distance (cm) 0 10/21/20 1311   Wound Assessment Granulation tissue 10/21/20 1311   Drainage Amount None 10/21/20 1311   Odor None 10/21/20 1311   Corine-wound Assessment Excoriated 10/21/20 1311   Margins Unattached edges 10/21/20 1311   Wound Thickness Description not for Pressure Injury Not applicable 32/96/22 5608   Number of days: 96       Wound 08/12/20 Foot Left Wound 1, Diabetic Lan 2, Amputation site 4,5th toes (Active)   Wound Image   10/21/20 1311   Wound Etiology Diabetic Lan 2 10/21/20 1311   Wound Cleansed Soap and water 10/21/20 1311   Dressing/Treatment Moist to dry 08/26/20 1559   Wound Length (cm) 1 cm 10/21/20 1311   Wound Width (cm) 0.8 cm 10/21/20 1311 Wound Depth (cm) 0.1 cm 10/21/20 1311   Wound Surface Area (cm^2) 0.8 cm^2 10/21/20 1311   Change in Wound Size % (l*w) 97.54 10/21/20 1311   Wound Volume (cm^3) 0.08 cm^3 10/21/20 1311   Wound Healing % 100 10/21/20 1311   Post-Procedure Length (cm) 1 cm 10/21/20 1322   Post-Procedure Width (cm) 0.8 cm 10/21/20 1322   Post-Procedure Depth (cm) 0.1 cm 10/21/20 1322   Post-Procedure Surface Area (cm^2) 0.8 cm^2 10/21/20 1322   Post-Procedure Volume (cm^3) 0.08 cm^3 10/21/20 1322   Distance Tunneling (cm) 0 cm 10/21/20 1311   Tunneling Position ___ O'Clock 0 10/21/20 1311   Undermining Starts ___ O'Clock 0 10/21/20 1311   Undermining Ends___ O'Clock 0 10/21/20 1311   Undermining Maxium Distance (cm) 0 10/21/20 1311   Wound Assessment Granulation tissue 10/21/20 1311   Drainage Amount Small 10/21/20 1311   Drainage Description Serosanguinous 10/21/20 1311   Odor None 10/21/20 1311   Corine-wound Assessment Intact 10/21/20 1311   Margins Attached edges 10/21/20 1311   Wound Thickness Description not for Pressure Injury Not applicable 82/49/72 0471   Number of days: 69                Estimated Blood Loss:  Minimal    Hemostasis Achieved:  by silver nitrate stick    Procedural Pain:  0  / 10     Post Procedural Pain:  0 / 10     Response to treatment:  Well tolerated by patient. Healing lateral left foot wound  Callus/ulcer continues right great toe and plantar foot great toe distal metatarsal      Plan:     Change right to aquacel  F/U 3 weeks  Offload  Problem List Items Addressed This Visit     None          Treatment Note please see attached Discharge Instructions    In my professional opinion this patient would benefit from HBO Therapy: No    Written patient dismissal instructions given to patient and signed by patient or POA.            Electronically signed by Carlos Headley MD on 10/21/2020 at 1:26 PM

## 2020-12-16 ENCOUNTER — HOSPITAL ENCOUNTER (OUTPATIENT)
Dept: WOUND CARE | Age: 55
Discharge: HOME | End: 2020-12-16

## 2020-12-28 ENCOUNTER — HOSPITAL ENCOUNTER (OUTPATIENT)
Dept: WOUND CARE | Age: 55
Discharge: HOME | End: 2020-12-28
Payer: MEDICARE

## 2020-12-28 VITALS
DIASTOLIC BLOOD PRESSURE: 74 MMHG | SYSTOLIC BLOOD PRESSURE: 120 MMHG | TEMPERATURE: 97.7 F | HEART RATE: 90 BPM | RESPIRATION RATE: 18 BRPM

## 2020-12-28 PROBLEM — L97.512 RIGHT FOOT ULCER, WITH FAT LAYER EXPOSED (HCC): Status: ACTIVE | Noted: 2020-07-17

## 2020-12-28 PROCEDURE — 97597 DBRDMT OPN WND 1ST 20 CM/<: CPT

## 2020-12-28 PROCEDURE — 97597 DBRDMT OPN WND 1ST 20 CM/<: CPT | Performed by: NURSE PRACTITIONER

## 2020-12-28 ASSESSMENT — PAIN SCALES - GENERAL: PAINLEVEL_OUTOF10: 0

## 2020-12-28 NOTE — PROGRESS NOTES
[address]   Progress Note and Procedure Note      Kenneth London  MEDICAL RECORD NUMBER:  [id number]  AGE: 50 y.o. GENDER: male  : 1965  EPISODE DATE:  2020    Subjective:     Chief Complaint   Patient presents with    Wound Check     Pt denies any concerns, noted patient manuver his wheelchair with his feet he does not use his arms and has no foot rests         HISTORY of PRESENT ILLNESS HPI     Kenneth London is a 50 y.o. male who presents today for wound/ulcer evaluation. History of Wound Context: right foot wound follow up  Ulcer Identification:  Ulcer Type: diabetic  Contributing Factors: diabetes, chronic pressure and shear force  PAST MEDICAL HISTORY        Diagnosis Date    Anxiety     Diabetes ([facility]     Schizophrenic disorder ([facility]        PAST SURGICAL HISTORY    Past Surgical History:   Procedure Laterality Date    TOE AMPUTATION Left 2020    4TH AND 5TH OPEN TOE AMPUTATION performed by Carolee Suggs MD at [address]    History reviewed. No pertinent family history.     SOCIAL HISTORY    Social History     Tobacco Use    Smoking status: Current Every Day Smoker     Packs/day: 1.00     Types: Cigars, Cigarettes    Smokeless tobacco: Never Used    Tobacco comment: 1 package of cigars daily   Substance Use Topics    Alcohol use: Not Currently    Drug use: Never       ALLERGIES    Allergies   Allergen Reactions    Clozaril [Clozapine]     Haldol [Haloperidol]     Lithium     Navane [Thiothixene]        MEDICATIONS    Current Outpatient Medications on File Prior to Encounter   Medication Sig Dispense Refill    polyethylene glycol (MIRALAX) 17 g PACK packet Take 17 g by mouth daily as needed      famotidine (PEPCID) 20 MG tablet Take 20 mg by mouth daily      risperiDONE (RISPERDAL) 2 MG tablet Take 2 mg by mouth daily      FLUoxetine (PROZAC) 40 MG capsule Take 40 mg by mouth nightly      OLANZapine zydis (ZYPREXA) 20 MG disintegrating tablet Take 20 mg by mouth nightly      insulin glargine (BASAGLAR KWIKPEN) 100 UNIT/ML injection pen Inject 50 Units into the skin nightly       risperiDONE (RISPERDAL) 4 MG tablet Take 4 mg by mouth nightly      simvastatin (ZOCOR) 20 MG tablet Take 20 mg by mouth nightly      acetaminophen (TYLENOL) 650 MG suppository Place 650 mg rectally every 6 hours as needed for Fever or Pain      acetaminophen (TYLENOL) 325 MG tablet Take 650 mg by mouth every 4 hours as needed for Pain      ibuprofen (ADVIL;MOTRIN) 400 MG tablet Take 400 mg by mouth every 6 hours as needed for Pain or Fever      loperamide (IMODIUM) 2 MG capsule Take 2 mg by mouth 4 times daily as needed for Diarrhea      hydrOXYzine (ATARAX) 25 MG tablet Take 25 mg by mouth 3 times daily as needed for Itching       No current facility-administered medications on file prior to encounter. REVIEW OF SYSTEMS    A comprehensive review of systems was negative.     Objective:      /74   Pulse 90   Temp 97.7 °F (36.5 °C) (Temporal)   Resp 18     Wt Readings from Last 3 Encounters:   10/21/20 229 lb (103.9 kg)   09/30/20 229 lb 1 oz (103.9 kg)   09/09/20 213 lb (96.6 kg)       PHYSICAL EXAM    General Appearance: alert and oriented to person, place and time, well developed and well- nourished, in no acute distress  Skin: warm and dry, no rash or erythema  Head: normocephalic and atraumatic  Eyes: pupils equal, round, and reactive to light, extraocular eye movements intact, conjunctivae normal  ENT: tympanic membrane, external ear and ear canal normal bilaterally, nose without deformity, nasal mucosa and turbinates normal without polyps  Neck: supple and non-tender without mass, no thyromegaly or thyroid nodules, no cervical lymphadenopathy  Pulmonary/Chest: clear to auscultation bilaterally- no wheezes, rales or rhonchi, normal air movement, no respiratory distress  Extremities: no cyanosis, clubbing or edema  Musculoskeletal: normal range of motion, no joint swelling, deformity or tenderness  Neurologic: reflexes normal and symmetric, no cranial nerve deficit, gait, coordination and speech normal      Assessment:      Patient Active Problem List   Diagnosis Code    Osteomyelitis of ankle or foot, acute, left ([facility]) M86.172    Major depression, recurrent, chronic ([facility]) F33.9    Bipolar disorder ([facility] F31.9    Psychiatric disorder F99    Leukocytosis D72.829    Right foot ulcer, with fat layer exposed ([facility] [facility] L97.512    PVD (peripheral vascular disease) ([facility] [facility] I73.9    Tobacco abuse counseling Z71.6    Cigarette nicotine dependence with nicotine-induced disorder F17.219    Schizophrenia ([facility] F20.9        Procedure Note  Indications:  Based on my examination of this patient's wound(s)/ulcer(s) today, debridement is required to promote healing and evaluate the wound base. Performed by: CAMDEN Benavides CNP    Consent obtained:  Yes    Time out taken:  Yes    Pain Control:         Debridement:Non-excisional Debridement    Using curette, #15 blade scalpel and forceps the wound(s)/ulcer(s) was/were sharply debrided down through and including the removal of epidermis and dermis.         Devitalized Tissue Debrided:  fibrin, biofilm, slough, exudate and callus    Pre Debridement Measurements:  Are located in the Wound/Ulcer Documentation Flow Sheet    Wound/Ulcer #: 2 and 3    Post Debridement Measurements:  Wound/Ulcer Descriptions are Pre Debridement except measurements:      Percent of Wound/Ulcer Debrided: 100%    Total Surface Area Debrided:  0.86 sq cm     Wound 07/17/20 Foot Plantar;Right Wound 2, Diabetic Lan 1, R. 1st Metatarsal Pad (Active)   Wound Image    12/28/20 0953   Wound Etiology Diabetic Lan 1 12/28/20 0953   Dressing Status Old drainage noted 12/28/20 0953   Wound Cleansed Soap and water 12/28/20 0953   Dressing/Treatment Xeroform 12/28/20 1100   Wound Length (cm) 1.6 cm 12/28/20 0953   Wound Width (cm) 0.5 cm 12/28/20 0953   Wound Depth (cm) 0.1 cm 12/28/20 0953   Wound Surface Area (cm^2) 0.8 cm^2 12/28/20 0953   Change in Wound Size % (l*w) -122.22 12/28/20 0953   Wound Volume (cm^3) 0.08 cm^3 12/28/20 0953   Wound Healing % -100 12/28/20 0953   Post-Procedure Length (cm) 1.6 cm 12/28/20 1030   Post-Procedure Width (cm) 0.5 cm 12/28/20 1030   Post-Procedure Depth (cm) 0.1 cm 12/28/20 1030   Post-Procedure Surface Area (cm^2) 0.8 cm^2 12/28/20 1030   Post-Procedure Volume (cm^3) 0.08 cm^3 12/28/20 1030   Distance Tunneling (cm) 0 cm 12/28/20 0953   Tunneling Position ___ O'Clock 0 12/28/20 0953   Undermining Starts ___ O'Clock 0 12/28/20 0953   Undermining Ends___ O'Clock 0 12/28/20 0953   Undermining Maxium Distance (cm) 0 12/28/20 0953   Wound Assessment Pink/red;Slough; Devitalized tissue 12/28/20 0953   Drainage Amount Small 12/28/20 0953   Drainage Description Serosanguinous 12/28/20 0953   Odor None 12/28/20 0953   Corine-wound Assessment Intact 12/28/20 0953   Margins Attached edges 12/28/20 0953   Wound Thickness Description not for Pressure Injury Not applicable 45/52/82 9427   Number of days: 163       Wound 07/17/20 Toe (Comment  which one) Plantar;Right Wound 3, Diabetic Lan 1,R. Great toe Plantar (Active)   Wound Image   12/28/20 0953   Wound Etiology Diabetic Lan 1 12/28/20 0953   Dressing Status Dry 12/28/20 0953   Wound Cleansed Soap and water 12/28/20 0953   Dressing/Treatment Xeroform 12/28/20 1100   Wound Length (cm) 0.3 cm 12/28/20 0953   Wound Width (cm) 0.2 cm 12/28/20 0953   Wound Depth (cm) 0.1 cm 12/28/20 0953   Wound Surface Area (cm^2) 0.06 cm^2 12/28/20 0953   Change in Wound Size % (l*w) 76 12/28/20 0953   Wound Volume (cm^3) 0.01 cm^3 12/28/20 0953   Wound Healing % 50 12/28/20 0953   Post-Procedure Length (cm) 0.3 cm 12/28/20 1030   Post-Procedure Width (cm) 0.2 cm 12/28/20 1030   Post-Procedure Depth (cm) 0.1 cm 12/28/20 1030   Post-Procedure Surface Area (cm^2) 0.06 cm^2 12/28/20 1030   Post-Procedure Volume (cm^3) 0.01 cm^3 12/28/20 1030   Distance Tunneling (cm) 0 cm 12/28/20 0953   Tunneling Position ___ O'Clock 0 12/28/20 0953   Undermining Starts ___ O'Clock 0 12/28/20 0953   Undermining Ends___ O'Clock 0 12/28/20 0953   Undermining Maxium Distance (cm) 0 12/28/20 0953   Wound Assessment Devitalized tissue;Slough;Pink/red 12/28/20 0953   Drainage Amount Scant 12/28/20 0953   Drainage Description Serosanguinous 12/28/20 0953   Odor None 12/28/20 0953   Corine-wound Assessment Hyperkeratosis (callous) 12/28/20 0953   Margins Defined edges 12/28/20 0953   Wound Thickness Description not for Pressure Injury Not applicable 81/49/65 4798   Number of days: 164       Wound 08/12/20 Foot Left Wound 1, Diabetic Lan 2, Amputation site 4,5th toes (Active)   Wound Image    12/28/20 0953   Wound Etiology Diabetic Lan 2 12/28/20 0953   Dressing Status New dressing applied 10/21/20 1345   Wound Cleansed Soap and water 12/28/20 0953   Dressing/Treatment Moisturizing cream 12/28/20 1030   Offloading for Diabetic Foot Ulcers Forefoot offloading shoe 12/28/20 0953   Wound Length (cm) 0 cm 12/28/20 0953   Wound Width (cm) 0 cm 12/28/20 0953   Wound Depth (cm) 0 cm 12/28/20 0953   Wound Surface Area (cm^2) 0 cm^2 12/28/20 0953   Change in Wound Size % (l*w) 100 12/28/20 0953   Wound Volume (cm^3) 0 cm^3 12/28/20 0953   Wound Healing % 100 12/28/20 0953   Post-Procedure Length (cm) 0 cm 12/28/20 0953   Post-Procedure Width (cm) 0 cm 12/28/20 0953   Post-Procedure Depth (cm) 0 cm 12/28/20 0953   Post-Procedure Surface Area (cm^2) 0 cm^2 12/28/20 0953   Post-Procedure Volume (cm^3) 0 cm^3 12/28/20 0953   Distance Tunneling (cm) 0 cm 12/28/20 0953   Tunneling Position ___ O'Clock 0 12/28/20 0953   Undermining Starts ___ O'Clock 0 12/28/20 0953   Undermining Ends___ O'Clock 0 12/28/20 0953   Undermining Maxium Distance (cm) 0 12/28/20 0953   Wound Assessment Epithelialization 12/28/20 0953   Drainage Amount None 12/28/20 0953   Drainage Description Serosanguinous 10/21/20 1311   Odor None 12/28/20 0953   Corine-wound Assessment Hyperkeratosis (callous) 12/28/20 0953   Margins Attached edges 10/21/20 1311   Wound Thickness Description not for Pressure Injury Not applicable 19/64/86 9235   Number of days: 137            Diabetic/Pressure/Non Pressure Ulcers only:  Ulcer: Diabetic ulcer, fat layer exposed      Estimated Blood Loss:  Minimal    Hemostasis Achieved:  by pressure    Procedural Pain:  0  / 10     Post Procedural Pain:  0 / 10     Response to treatment:  Well tolerated by patient. Plan:     Problem List Items Addressed This Visit     * (Principal) Right foot ulcer, with fat layer exposed ([facility] [address] - Primary    PVD (peripheral vascular disease) ([facility] [address]    Cigarette nicotine dependence with nicotine-induced disorder          Treatment Note please see attached Discharge Instructions    In my professional opinion this patient would benefit from HBO Therapy: No    Written patient dismissal instructions given to patient and signed by patient or POA.            Electronically signed by CAMDEN Yost CNP on 12/28/2020 at 12:09 PM

## 2021-01-28 ENCOUNTER — HOSPITAL ENCOUNTER (OUTPATIENT)
Dept: WOUND CARE | Age: 56
Discharge: HOME | End: 2021-01-28
Payer: MEDICARE

## 2021-01-28 VITALS — HEIGHT: 75 IN | BODY MASS INDEX: 28.47 KG/M2 | WEIGHT: 229 LBS

## 2021-01-28 DIAGNOSIS — I73.9 PVD (PERIPHERAL VASCULAR DISEASE) (HCC): Primary | ICD-10-CM

## 2021-01-28 DIAGNOSIS — F17.219 CIGARETTE NICOTINE DEPENDENCE WITH NICOTINE-INDUCED DISORDER: ICD-10-CM

## 2021-01-28 DIAGNOSIS — L97.512 RIGHT FOOT ULCER, WITH FAT LAYER EXPOSED (HCC): ICD-10-CM

## 2021-01-28 PROCEDURE — 97597 DBRDMT OPN WND 1ST 20 CM/<: CPT | Performed by: NURSE PRACTITIONER

## 2021-01-28 PROCEDURE — 97597 DBRDMT OPN WND 1ST 20 CM/<: CPT

## 2021-01-28 ASSESSMENT — PAIN SCALES - GENERAL: PAINLEVEL_OUTOF10: 0

## 2021-01-28 NOTE — PROGRESS NOTES
[address]   Progress Note and Procedure Note      Tru Aggarwal  MEDICAL RECORD NUMBER:  [id number]  AGE: 50 y.o. GENDER: male  : 1965  EPISODE DATE:  2021    Subjective:     Chief Complaint   Patient presents with    Wound Check     follow up         HISTORY of PRESENT ILLNESS HPI     Tru Aggarwal is a 50 y.o. male who presents today for wound/ulcer evaluation. History of Wound Context: right foot   Ulcer Identification:  Ulcer Type: diabetic  Contributing Factors: diabetes, chronic pressure, shear force, obesity and non-adherence    Wound: N/A        PAST MEDICAL HISTORY        Diagnosis Date    Anxiety     [facility]     Schizophrenic disorder ([facility] [address] [facility]        PAST SURGICAL HISTORY    Past Surgical History:   Procedure Laterality Date    TOE AMPUTATION Left 2020    4TH AND 5TH OPEN TOE AMPUTATION performed by Christoph Lui MD at [address]    History reviewed. No pertinent family history.     SOCIAL HISTORY    Social History     Tobacco Use    Smoking status: Current Every Day Smoker     Packs/day: 1.00     Types: Cigars, Cigarettes    Smokeless tobacco: Never Used    Tobacco comment: 1 package of cigars daily   Substance Use Topics    Alcohol use: Not Currently    Drug use: Never       ALLERGIES    Allergies   Allergen Reactions    Clozaril [Clozapine]     Haldol [Haloperidol]     Lithium     Navane [Thiothixene]        MEDICATIONS    Current Outpatient Medications on File Prior to Encounter   Medication Sig Dispense Refill    polyethylene glycol (MIRALAX) 17 g PACK packet Take 17 g by mouth daily as needed      acetaminophen (TYLENOL) 650 MG suppository Place 650 mg rectally every 6 hours as needed for Fever or Pain      acetaminophen (TYLENOL) 325 MG tablet Take 650 mg by mouth every 4 hours as needed for Pain      famotidine (PEPCID) 20 MG tablet Take 20 mg by mouth daily      risperiDONE (RISPERDAL) 2 MG tablet Take 2 mg by mouth daily      ibuprofen (ADVIL;MOTRIN) 400 MG tablet Take 400 mg by mouth every 6 hours as needed for Pain or Fever      loperamide (IMODIUM) 2 MG capsule Take 2 mg by mouth 4 times daily as needed for Diarrhea      FLUoxetine (PROZAC) 40 MG capsule Take 40 mg by mouth nightly      OLANZapine zydis (ZYPREXA) 20 MG disintegrating tablet Take 20 mg by mouth nightly      insulin glargine (BASAGLAR KWIKPEN) 100 UNIT/ML injection pen Inject 50 Units into the skin nightly       hydrOXYzine (ATARAX) 25 MG tablet Take 25 mg by mouth 3 times daily as needed for Itching      risperiDONE (RISPERDAL) 4 MG tablet Take 4 mg by mouth nightly      simvastatin (ZOCOR) 20 MG tablet Take 20 mg by mouth nightly       No current facility-administered medications on file prior to encounter. REVIEW OF SYSTEMS    A comprehensive review of systems was negative.     Objective:      Ht 6' 3\" (1.905 m)   Wt 229 lb (103.9 kg)   BMI 28.62 kg/m²     Wt Readings from Last 3 Encounters:   01/28/21 229 lb (103.9 kg)   10/21/20 229 lb (103.9 kg)   09/30/20 229 lb 1 oz (103.9 kg)       PHYSICAL EXAM    General Appearance: alert and oriented to person, place and time, well developed and well- nourished, in no acute distress  Skin: warm and dry, no rash or erythema  Head: normocephalic and atraumatic  Eyes: pupils equal, round, and reactive to light, extraocular eye movements intact, conjunctivae normal  ENT: tympanic membrane, external ear and ear canal normal bilaterally, nose without deformity, nasal mucosa and turbinates normal without polyps  Neck: supple and non-tender without mass, no thyromegaly or thyroid nodules, no cervical lymphadenopathy  Pulmonary/Chest: clear to auscultation bilaterally- no wheezes, rales or rhonchi, normal air movement, no respiratory distress  Extremities: no cyanosis, clubbing or edema  Musculoskeletal: normal range of motion, no joint swelling, deformity or tenderness  Neurologic: reflexes normal and symmetric, no cranial nerve deficit, gait, coordination and speech normal      Assessment:      Patient Active Problem List   Diagnosis Code    Osteomyelitis of ankle or foot, acute, left ([facility]) M86.172    Major depression, recurrent, chronic ([facility]) F33.9    Bipolar disorder ([facility] [address] F31.9    Psychiatric disorder F99    Leukocytosis D72.829    Right foot ulcer, with fat layer exposed ([facility] L97.512    PVD (peripheral vascular disease) ([facility] I73.9    Tobacco abuse counseling Z71.6    Cigarette nicotine dependence with nicotine-induced disorder F17.219    Schizophrenia ([facility] F20.9        Procedure Note  Indications:  Based on my examination of this patient's wound(s)/ulcer(s) today, debridement is required to promote healing and evaluate the wound base. Performed by: CAMDEN Barba CNP    Consent obtained:  Yes    Time out taken:  Yes    Pain Control:         Debridement:Non-excisional Debridement    Using #15 blade scalpel and forceps the wound(s)/ulcer(s) was/were sharply debrided down through and including the removal of epidermis and dermis.         Devitalized Tissue Debrided:  fibrin, biofilm, slough, exudate and callus    Pre Debridement Measurements:  Are located in the Wound/Ulcer Documentation Flow Sheet    Wound/Ulcer #: 2    Post Debridement Measurements:  Wound/Ulcer Descriptions are Pre Debridement except measurements:      Percent of Wound/Ulcer Debrided: 100%    Total Surface Area Debrided:  0.5 sq cm     Wound 07/17/20 Foot Plantar;Right Wound 2, Diabetic Lan 1, R. 1st Metatarsal Pad (Active)   Wound Image   01/28/21 0926   Wound Etiology Diabetic Lan 1 01/28/21 0926   Dressing Status Old drainage noted 01/28/21 0926   Wound Cleansed Soap and water 01/28/21 0926   Dressing/Treatment Xeroform 01/28/21 0947   Wound Length (cm) 1 cm 01/28/21 0926   Wound Width (cm) 0.5 cm 01/28/21 0926   Wound Depth (cm) 0.1 cm 01/28/21 0926   Wound Surface Area (cm^2) 0.5 cm^2 01/28/21 0137   Change in Wound Size % (l*w) -38.89 01/28/21 0926   Wound Volume (cm^3) 0.05 cm^3 01/28/21 0926   Wound Healing % -25 01/28/21 0926   Post-Procedure Length (cm) 0.3 cm 01/28/21 0947   Post-Procedure Width (cm) 0.9 cm 01/28/21 0947   Post-Procedure Depth (cm) 0.1 cm 01/28/21 0947   Post-Procedure Surface Area (cm^2) 0.27 cm^2 01/28/21 0947   Post-Procedure Volume (cm^3) 0.03 cm^3 01/28/21 0947   Distance Tunneling (cm) 0 cm 01/28/21 0926   Tunneling Position ___ O'Clock 0 01/28/21 0926   Undermining Starts ___ O'Clock 0 01/28/21 0926   Undermining Ends___ O'Clock 0 01/28/21 0926   Undermining Maxium Distance (cm) 0 01/28/21 0926   Wound Assessment Pink/red;Slough; Devitalized tissue 01/28/21 0926   Drainage Amount Small 01/28/21 0926   Drainage Description Serosanguinous 01/28/21 0926   Odor None 01/28/21 0926   Corine-wound Assessment Intact 01/28/21 0926   Margins Attached edges 01/28/21 0926   Wound Thickness Description not for Pressure Injury Not applicable 70/11/33 6654   Number of days: 194       Wound 07/17/20 Toe (Comment  which one) Plantar;Right Wound 3, Diabetic Lan 1,R. Great toe Plantar (Active)   Wound Image   01/28/21 0926   Wound Etiology Diabetic Lan 1 01/28/21 0926   Dressing Status Dry 01/28/21 0926   Wound Cleansed Soap and water 01/28/21 0926   Dressing/Treatment Open to air 01/28/21 0947   Wound Length (cm) 0 cm 01/28/21 0926   Wound Width (cm) 0 cm 01/28/21 0926   Wound Depth (cm) 0 cm 01/28/21 0926   Wound Surface Area (cm^2) 0 cm^2 01/28/21 0926   Change in Wound Size % (l*w) 100 01/28/21 0926   Wound Volume (cm^3) 0 cm^3 01/28/21 0926   Wound Healing % 100 01/28/21 0926   Post-Procedure Length (cm) 0 cm 01/28/21 0947   Post-Procedure Width (cm) 0 cm 01/28/21 0947   Post-Procedure Depth (cm) 0 cm 01/28/21 0947   Post-Procedure Surface Area (cm^2) 0 cm^2 01/28/21 0947   Post-Procedure Volume (cm^3) 0 cm^3 01/28/21 0947   Distance Tunneling (cm) 0 cm 01/28/21 0926   Tunneling Position ___ O'Clock 0 01/28/21 0926   Undermining Starts ___ O'Clock 0 01/28/21 0926   Undermining Ends___ O'Clock 0 01/28/21 0926   Undermining Maxium Distance (cm) 0 01/28/21 0926   Wound Assessment Devitalized tissue;Slough;Pink/red 01/28/21 0926   Drainage Amount Scant 01/28/21 0926   Drainage Description Serosanguinous 01/28/21 0926   Odor None 01/28/21 0926   Corine-wound Assessment Hyperkeratosis (callous) 01/28/21 0926   Margins Defined edges 01/28/21 0926   Wound Thickness Description not for Pressure Injury Not applicable 16/31/11 0296   Number of days: 194            Diabetic/Pressure/Non Pressure Ulcers only:  Ulcer: Diabetic ulcer, fat layer exposed      Estimated Blood Loss:  Minimal    Hemostasis Achieved:  by pressure    Procedural Pain:  0  / 10     Post Procedural Pain:  0 / 10     Response to treatment:  Well tolerated by patient.        Plan:     Problem List Items Addressed This Visit     * (Principal) Right foot ulcer, with fat layer exposed ([facility]    Relevant Medications    Lidocaine 2 % GEL (Start on 1/28/2021 11:00 AM)    Other Relevant Orders    Supply: Wound Cleanser    Supply: Corine Wound    Supply: Wound Dressings    Supply: Pack Wound    Supply: Cover and Secure    PVD (peripheral vascular disease) ([facility] [address] - Primary    Relevant Medications    Lidocaine 2 % GEL (Start on 1/28/2021 11:00 AM)    Other Relevant Orders    Supply: Wound Cleanser    Supply: Corine Wound    Supply: Wound Dressings    Supply: Pack Wound    Supply: Cover and Secure    Cigarette nicotine dependence with nicotine-induced disorder    Relevant Medications    Lidocaine 2 % GEL (Start on 1/28/2021 11:00 AM)    Other Relevant Orders    Supply: Wound Cleanser    Supply: Corine Wound    Supply: Wound Dressings    Supply: Pack Wound    Supply: Cover and Secure          Treatment Note please see attached Discharge Instructions    In my professional opinion this patient would benefit from HBO Therapy: No    Written patient dismissal instructions given to patient and signed by patient or POA. Mr. Rinku Winston is making great progress!   Electronically signed by Dorsey Homans, APRN - CNP on 1/28/2021 at 10:33 AM

## 2021-02-02 ENCOUNTER — HOSPITAL ENCOUNTER (OUTPATIENT)
Dept: WOUND CARE | Age: 56
Discharge: HOME | End: 2021-02-02
Payer: MEDICARE

## 2021-02-02 VITALS
WEIGHT: 229.6 LBS | HEIGHT: 75 IN | SYSTOLIC BLOOD PRESSURE: 127 MMHG | RESPIRATION RATE: 18 BRPM | HEART RATE: 86 BPM | TEMPERATURE: 97.8 F | BODY MASS INDEX: 28.55 KG/M2 | DIASTOLIC BLOOD PRESSURE: 79 MMHG

## 2021-02-02 DIAGNOSIS — L97.512 RIGHT FOOT ULCER, WITH FAT LAYER EXPOSED (HCC): ICD-10-CM

## 2021-02-02 DIAGNOSIS — F17.219 CIGARETTE NICOTINE DEPENDENCE WITH NICOTINE-INDUCED DISORDER: ICD-10-CM

## 2021-02-02 DIAGNOSIS — L97.522 ULCER OF TOE OF LEFT FOOT, WITH FAT LAYER EXPOSED (HCC): Chronic | ICD-10-CM

## 2021-02-02 DIAGNOSIS — I73.9 PVD (PERIPHERAL VASCULAR DISEASE) (HCC): Primary | ICD-10-CM

## 2021-02-02 PROCEDURE — 97597 DBRDMT OPN WND 1ST 20 CM/<: CPT

## 2021-02-02 PROCEDURE — 97597 DBRDMT OPN WND 1ST 20 CM/<: CPT | Performed by: SURGERY

## 2021-02-02 ASSESSMENT — PAIN SCALES - GENERAL: PAINLEVEL_OUTOF10: 0

## 2021-02-08 ENCOUNTER — HOSPITAL ENCOUNTER (OUTPATIENT)
Dept: WOUND CARE | Age: 56
Discharge: HOME | End: 2021-02-08
Payer: MEDICARE

## 2021-02-08 VITALS
TEMPERATURE: 98.5 F | RESPIRATION RATE: 20 BRPM | HEART RATE: 86 BPM | SYSTOLIC BLOOD PRESSURE: 125 MMHG | WEIGHT: 229 LBS | BODY MASS INDEX: 28.62 KG/M2 | DIASTOLIC BLOOD PRESSURE: 85 MMHG

## 2021-02-08 DIAGNOSIS — F17.219 CIGARETTE NICOTINE DEPENDENCE WITH NICOTINE-INDUCED DISORDER: ICD-10-CM

## 2021-02-08 DIAGNOSIS — L97.512 RIGHT FOOT ULCER, WITH FAT LAYER EXPOSED (HCC): ICD-10-CM

## 2021-02-08 DIAGNOSIS — L97.522 ULCER OF TOE OF LEFT FOOT, WITH FAT LAYER EXPOSED (HCC): Primary | ICD-10-CM

## 2021-02-08 DIAGNOSIS — I73.9 PVD (PERIPHERAL VASCULAR DISEASE) (HCC): ICD-10-CM

## 2021-02-08 PROCEDURE — 97597 DBRDMT OPN WND 1ST 20 CM/<: CPT

## 2021-02-08 PROCEDURE — 97597 DBRDMT OPN WND 1ST 20 CM/<: CPT | Performed by: SURGERY

## 2021-02-08 NOTE — PROGRESS NOTES
[address]   Progress Note and Procedure Note      Tru Aggarwal  MEDICAL RECORD NUMBER:  [id number]  AGE: 50 y.o. GENDER: male  : 1965  EPISODE DATE:  2021    Subjective:     Chief Complaint   Patient presents with    Wound Check     when asked patient states feet are doing good, stitches were removed on Friday         HISTORY of PRESENT ILLNESS HPI     Tru Aggarwal is a 50 y.o. male who presents today for wound/ulcer evaluation. Wound Context: Pt with R foot ulcer here for eval/treat  Wound/Ulcer Pain Timing/Severity: none  Quality of pain: N/A  Severity:  0 / 10   Modifying Factors: None  Associated Signs/Symptoms: none    Ulcer Identification:  Ulcer Type: pressure  Contributing Factors: chronic pressure    Wound: pressure        PAST MEDICAL HISTORY        Diagnosis Date    Anxiety     Diabetes ([facility] [address]     Schizophrenic disorder ([facility] [address]        PAST SURGICAL HISTORY    Past Surgical History:   Procedure Laterality Date    TOE AMPUTATION Left 2020    4TH AND 5TH OPEN TOE AMPUTATION performed by Christoph Lui MD at [facility] [address]    History reviewed. No pertinent family history.     SOCIAL HISTORY    Social History     Tobacco Use    Smoking status: Former Smoker     Packs/day: 0.00     Types: Cigars    Smokeless tobacco: Never Used    Tobacco comment: Had to quit can not smoke, can not go outside   Substance Use Topics    Alcohol use: Not Currently    Drug use: Never       ALLERGIES    Allergies   Allergen Reactions    Clozaril [Clozapine]     Haldol [Haloperidol]     Lithium     Navane [Thiothixene]        MEDICATIONS    Current Outpatient Medications on File Prior to Encounter   Medication Sig Dispense Refill    CHOLECALCIFEROL PO Take 1,000 Units by mouth daily      polyethylene glycol (MIRALAX) 17 g PACK packet Take 17 g by mouth daily as needed      acetaminophen (TYLENOL) 650 MG suppository Place 650 mg rectally every 6 hours as needed for Fever or Pain      acetaminophen (TYLENOL) 325 MG tablet Take 650 mg by mouth every 4 hours as needed for Pain      famotidine (PEPCID) 20 MG tablet Take 20 mg by mouth daily      risperiDONE (RISPERDAL) 2 MG tablet Take 2 mg by mouth daily      ibuprofen (ADVIL;MOTRIN) 400 MG tablet Take 400 mg by mouth every 6 hours as needed for Pain or Fever      loperamide (IMODIUM) 2 MG capsule Take 2 mg by mouth 4 times daily as needed for Diarrhea      FLUoxetine (PROZAC) 40 MG capsule Take 40 mg by mouth nightly      OLANZapine zydis (ZYPREXA) 20 MG disintegrating tablet Take 20 mg by mouth nightly      insulin glargine (BASAGLAR KWIKPEN) 100 UNIT/ML injection pen Inject 100 Units into the skin nightly       hydrOXYzine (ATARAX) 25 MG tablet Take 25 mg by mouth 3 times daily as needed for Itching      risperiDONE (RISPERDAL) 4 MG tablet Take 4 mg by mouth nightly      simvastatin (ZOCOR) 20 MG tablet Take 20 mg by mouth nightly       No current facility-administered medications on file prior to encounter. REVIEW OF SYSTEMS    A comprehensive review of systems was negative.     Objective:      /85   Pulse 86   Temp 98.5 °F (36.9 °C) (Temporal)   Resp 20   Wt 229 lb (103.9 kg) Comment: weighed last week  BMI 28.62 kg/m²     Wt Readings from Last 3 Encounters:   02/08/21 229 lb (103.9 kg)   02/02/21 229 lb 9.6 oz (104.1 kg)   01/28/21 229 lb (103.9 kg)       PHYSICAL EXAM    General Appearance: alert and oriented to person, place and time, well developed and well- nourished, in no acute distress  Skin: warm and dry, no rash or erythema  Head: normocephalic and atraumatic  Eyes: pupils equal, round, and reactive to light, extraocular eye movements intact, conjunctivae normal  ENT: tympanic membrane, external ear and ear canal normal bilaterally, nose without deformity, nasal mucosa and turbinates normal without polyps, lips teeth and gums normal  Neck: supple and non-tender without mass, no Area Debrided:  0.14 sq cm       Diabetic/Pressure/Non Pressure Ulcers only:  Ulcer: Diabetic ulcer, fat layer exposed           Post Debridement Measurements:    Wound/Ulcer Descriptions are Pre Debridement --EXCEPT MEASUREMENTS    Wound 07/17/20 Foot Plantar;Right Wound 2, Diabetic Lan 1, R. 1st Metatarsal Pad (Active)   Wound Image    02/08/21 1546   Wound Etiology Diabetic Lan 1 02/08/21 1546   Dressing Status Old drainage noted 02/08/21 1546   Wound Cleansed Soap and water 02/08/21 1546   Dressing/Treatment Xeroform 01/28/21 0947   Offloading for Diabetic Foot Ulcers Felt or foam 02/08/21 1546   Wound Length (cm) 0.7 cm 02/08/21 1546   Wound Width (cm) 0.2 cm 02/08/21 1546   Wound Depth (cm) 0.1 cm 02/08/21 1546   Wound Surface Area (cm^2) 0.14 cm^2 02/08/21 1546   Change in Wound Size % (l*w) 61.11 02/08/21 1546   Wound Volume (cm^3) 0.01 cm^3 02/08/21 1546   Wound Healing % 75 02/08/21 1546   Post-Procedure Length (cm) 0.7 cm 02/08/21 1614   Post-Procedure Width (cm) 0.2 cm 02/08/21 1614   Post-Procedure Depth (cm) 0.1 cm 02/08/21 1614   Post-Procedure Surface Area (cm^2) 0.14 cm^2 02/08/21 1614   Post-Procedure Volume (cm^3) 0.01 cm^3 02/08/21 1614   Distance Tunneling (cm) 0 cm 02/08/21 1546   Tunneling Position ___ O'Clock 0 02/08/21 1546   Undermining Starts ___ O'Clock 10 02/08/21 1546   Undermining Ends___ O'Clock 2 02/08/21 1546   Undermining Maxium Distance (cm) 0.2 02/08/21 1546   Wound Assessment Pink/red;Slough 02/08/21 1546   Drainage Amount Small 02/08/21 1546   Drainage Description Serosanguinous 02/08/21 1546   Odor None 02/08/21 1546   Corine-wound Assessment Hyperkeratosis (callous) 02/08/21 1546   Margins Unattached edges 02/08/21 1546   Wound Thickness Description not for Pressure Injury Not applicable 88/32/21 8109   Number of days: 205             Estimated Blood Loss:  Minimal    Hemostasis Achieved:  by pressure    Procedural Pain:  0  / 10     Post Procedural Pain:  0 / 10 Response to treatment:  Well tolerated by patient. Plan:     Problem List Items Addressed This Visit     * (Principal) Right foot ulcer, with fat layer exposed ([address] (Chronic)    Relevant Orders    Supply: Wound Cleanser    PVD (peripheral vascular disease) ([address]    Relevant Orders    Supply: Wound Cleanser    Cigarette nicotine dependence with nicotine-induced disorder    Relevant Orders    Supply: Wound Cleanser    Ulcer of toe of left foot, with fat layer exposed ([address] - Primary    Relevant Orders    Supply: Wound Cleanser          Cont current care F/F    Treatment Note please see attached Discharge Instructions    In my professional opinion this patient would benefit from HBO Therapy: No    Written patient dismissal instructions given to patient and signed by patient or POA.              Electronically signed by Jose Vega MD on 2/8/2021 at 4:19 PM

## 2021-02-22 ENCOUNTER — HOSPITAL ENCOUNTER (OUTPATIENT)
Dept: WOUND CARE | Age: 56
Discharge: HOME | End: 2021-02-22
Payer: MEDICARE

## 2021-02-22 VITALS
HEIGHT: 75 IN | DIASTOLIC BLOOD PRESSURE: 80 MMHG | TEMPERATURE: 98 F | SYSTOLIC BLOOD PRESSURE: 123 MMHG | BODY MASS INDEX: 28.47 KG/M2 | HEART RATE: 78 BPM | WEIGHT: 229 LBS | RESPIRATION RATE: 16 BRPM

## 2021-02-22 DIAGNOSIS — I73.9 PVD (PERIPHERAL VASCULAR DISEASE) (HCC): ICD-10-CM

## 2021-02-22 DIAGNOSIS — F17.219 CIGARETTE NICOTINE DEPENDENCE WITH NICOTINE-INDUCED DISORDER: ICD-10-CM

## 2021-02-22 DIAGNOSIS — L97.412 DIABETIC ULCER OF RIGHT MIDFOOT ASSOCIATED WITH TYPE 2 DIABETES MELLITUS, WITH FAT LAYER EXPOSED (HCC): Chronic | ICD-10-CM

## 2021-02-22 DIAGNOSIS — E11.621 DIABETIC ULCER OF RIGHT MIDFOOT ASSOCIATED WITH TYPE 2 DIABETES MELLITUS, WITH FAT LAYER EXPOSED (HCC): Chronic | ICD-10-CM

## 2021-02-22 DIAGNOSIS — L97.522 ULCER OF TOE OF LEFT FOOT, WITH FAT LAYER EXPOSED (HCC): Primary | ICD-10-CM

## 2021-02-22 DIAGNOSIS — L97.512 RIGHT FOOT ULCER, WITH FAT LAYER EXPOSED (HCC): ICD-10-CM

## 2021-02-22 PROCEDURE — 97597 DBRDMT OPN WND 1ST 20 CM/<: CPT

## 2021-02-22 PROCEDURE — 97597 DBRDMT OPN WND 1ST 20 CM/<: CPT | Performed by: SURGERY

## 2021-02-22 ASSESSMENT — PAIN SCALES - GENERAL: PAINLEVEL_OUTOF10: 0

## 2021-02-22 NOTE — PROGRESS NOTES
[address]   Progress Note and Procedure Note      Abdifatah Franco  MEDICAL RECORD NUMBER:  [id number]  AGE: 50 y.o. GENDER: male  : 1965  EPISODE DATE:  2021    Subjective:     Chief Complaint   Patient presents with    Wound Check     follow up         HISTORY of PRESENT ILLNESS HPI     Abdifatah Franco is a 50 y.o. male who presents today for wound/ulcer evaluation. Wound Context: Pt with DM ulcer on R midfoot here for eval/treat  Wound/Ulcer Pain Timing/Severity: none  Quality of pain: N/A  Severity:  0 / 10   Modifying Factors: None  Associated Signs/Symptoms: none    Ulcer Identification:  Ulcer Type: diabetic and pressure  Contributing Factors: diabetes, chronic pressure and shear force    Wound: DM        PAST MEDICAL HISTORY        Diagnosis Date    Anxiety     [facility]     Schizophrenic disorder ([facility]        PAST SURGICAL HISTORY    Past Surgical History:   Procedure Laterality Date    TOE AMPUTATION Left 2020    4TH AND 5TH OPEN TOE AMPUTATION performed by Maritza Wills MD at [address]    History reviewed. No pertinent family history.     SOCIAL HISTORY    Social History     Tobacco Use    Smoking status: Former Smoker     Packs/day: 0.00     Types: Cigars    Smokeless tobacco: Never Used    Tobacco comment: Had to quit can not smoke, can not go outside   Substance Use Topics    Alcohol use: Not Currently    Drug use: Never       ALLERGIES    Allergies   Allergen Reactions    Clozaril [Clozapine]     Haldol [Haloperidol]     Lithium     Navane [Thiothixene]        MEDICATIONS    Current Outpatient Medications on File Prior to Encounter   Medication Sig Dispense Refill    CHOLECALCIFEROL PO Take 1,000 Units by mouth daily      polyethylene glycol (MIRALAX) 17 g PACK packet Take 17 g by mouth daily as needed      acetaminophen (TYLENOL) 650 MG suppository Place 650 mg rectally every 6 hours as needed for Fever or Pain      acetaminophen (TYLENOL) 325 MG tablet Take 650 mg by mouth every 4 hours as needed for Pain      famotidine (PEPCID) 20 MG tablet Take 20 mg by mouth daily      risperiDONE (RISPERDAL) 2 MG tablet Take 2 mg by mouth daily      ibuprofen (ADVIL;MOTRIN) 400 MG tablet Take 400 mg by mouth every 6 hours as needed for Pain or Fever      loperamide (IMODIUM) 2 MG capsule Take 2 mg by mouth 4 times daily as needed for Diarrhea      FLUoxetine (PROZAC) 40 MG capsule Take 40 mg by mouth nightly      OLANZapine zydis (ZYPREXA) 20 MG disintegrating tablet Take 20 mg by mouth nightly      insulin glargine (BASAGLAR KWIKPEN) 100 UNIT/ML injection pen Inject 100 Units into the skin nightly       hydrOXYzine (ATARAX) 25 MG tablet Take 25 mg by mouth 3 times daily as needed for Itching      risperiDONE (RISPERDAL) 4 MG tablet Take 4 mg by mouth nightly      simvastatin (ZOCOR) 20 MG tablet Take 20 mg by mouth nightly       No current facility-administered medications on file prior to encounter. REVIEW OF SYSTEMS    A comprehensive review of systems was negative.     Objective:      /80   Pulse 78   Temp 98 °F (36.7 °C) (Temporal)   Resp 16   Ht 6' 3\" (1.905 m)   Wt 229 lb (103.9 kg)   BMI 28.62 kg/m²     Wt Readings from Last 3 Encounters:   02/22/21 229 lb (103.9 kg)   02/08/21 229 lb (103.9 kg)   02/02/21 229 lb 9.6 oz (104.1 kg)       PHYSICAL EXAM    General Appearance: alert and oriented to person, place and time, well developed and well- nourished, in no acute distress  Skin: warm and dry, no rash or erythema  Head: normocephalic and atraumatic  Eyes: pupils equal, round, and reactive to light, extraocular eye movements intact, conjunctivae normal  ENT: tympanic membrane, external ear and ear canal normal bilaterally, nose without deformity, nasal mucosa and turbinates normal without polyps, lips teeth and gums normal  Neck: supple and non-tender without mass, no thyromegaly or thyroid nodules, no cervical lymphadenopathy  Pulmonary/Chest: clear to auscultation bilaterally- no wheezes, rales or rhonchi, normal air movement, no respiratory distress  Cardiovascular: normal rate, regular rhythm, normal S1 and S2, no murmurs, rubs, clicks, or gallops, distal pulses intact, no carotid bruits  Abdomen: soft, non-tender, non-distended, normal bowel sounds, no masses or organomegaly  Extremities: no cyanosis, clubbing or edema  Musculoskeletal: normal range of motion, no joint swelling, deformity or tenderness  Neurologic: reflexes normal and symmetric, no cranial nerve deficit, gait, coordination and speech normal, sensation of skin normal      Assessment:      Problem List Items Addressed This Visit     Right foot ulcer, with fat layer exposed ([address] (Chronic)    Relevant Orders    Supply: Wound Cleanser    Supply: Corine Wound    Supply: Pack Wound    Supply: Cover and Secure    * (Principal) Diabetic ulcer of right midfoot associated with type 2 diabetes mellitus, with fat layer exposed (HCC) (Chronic)    PVD (peripheral vascular disease) (HCC)    Relevant Orders    Supply: Wound Cleanser    Supply: Corine Wound    Supply: Pack Wound    Supply: Cover and Secure    Cigarette nicotine dependence with nicotine-induced disorder    Relevant Orders    Supply: Wound Cleanser    Supply: Corine Wound    Supply: Pack Wound    Supply: Cover and Secure    Ulcer of toe of left foot, with fat layer exposed ([address] - Primary    Relevant Orders    Supply: Wound Cleanser    Supply: Corine Wound    Supply: Pack Wound    Supply: Cover and Secure           Procedure Note  Indications:  Based on my examination of this patient's wound(s)/ulcer(s) today, debridement is required to promote healing and evaluate the wound base.     Performed by: Denise Correa MD    Consent obtained:  Yes    Time out taken:  Yes    Pain Control: Anesthetic  Anesthetic: 2% Lidocaine Gel Topical       Debridement:Non-excisional Debridement    Using curette the wound(s)/ulcer(s) was/were sharply debrided down through and including the removal of epidermis and dermis.         Devitalized Tissue Debrided:  fibrin, biofilm, slough, necrotic/eschar, exudate and callus      Pre Debridement Measurements:  Are located in the Wound/Ulcer Documentation Flow Sheet    Wound/Ulcer #: 2    Percent of Wound(s)/Ulcer(s) Debrided: 100%    Total Surface Area Debrided:  2.25 sq cm       Diabetic/Pressure/Non Pressure Ulcers only:  Ulcer: Diabetic ulcer, fat layer exposed             Post Debridement Measurements:    Wound/Ulcer Descriptions are Pre Debridement --EXCEPT MEASUREMENTS    Wound 07/17/20 Foot Plantar;Right Wound 2, Diabetic Aln 1, R. 1st Metatarsal Pad (Active)   Wound Image   02/22/21 0909   Wound Etiology Diabetic Lan 1 02/22/21 0909   Dressing Status New drainage noted 02/22/21 0909   Wound Cleansed Cleansed with saline 02/22/21 3137   Dressing/Treatment Xeroform;Gauze dressing/dressing sponge;Roll gauze;Tape/Soft cloth adhesive tape 02/08/21 1700   Offloading for Diabetic Foot Ulcers Felt or foam 02/08/21 1700   Wound Length (cm) 1.5 cm 02/22/21 0909   Wound Width (cm) 1.5 cm 02/22/21 0909   Wound Depth (cm) 0.1 cm 02/22/21 0909   Wound Surface Area (cm^2) 2.25 cm^2 02/22/21 0909   Change in Wound Size % (l*w) -525 02/22/21 0909   Wound Volume (cm^3) 0.22 cm^3 02/22/21 0909   Wound Healing % -450 02/22/21 0909   Post-Procedure Length (cm) 1.5 cm 02/22/21 0914   Post-Procedure Width (cm) 1.5 cm 02/22/21 0914   Post-Procedure Depth (cm) 0.2 cm 02/22/21 0914   Post-Procedure Surface Area (cm^2) 2.25 cm^2 02/22/21 0914   Post-Procedure Volume (cm^3) 0.45 cm^3 02/22/21 0914   Distance Tunneling (cm) 0 cm 02/22/21 0909   Tunneling Position ___ O'Clock 0 02/22/21 0909   Undermining Starts ___ O'Clock 10 02/22/21 0909   Undermining Ends___ O'Clock 12 02/22/21 0909   Undermining Maxium Distance (cm) 1.0 02/22/21 0909   Wound Assessment Pink/red;Slough 02/22/21 0909   Drainage Amount Small 02/22/21 0909   Drainage Description Serosanguinous 02/22/21 0909   Odor None 02/22/21 0909   Corine-wound Assessment Hyperkeratosis (callous) 02/22/21 0909   Margins Unattached edges 02/22/21 0909   Wound Thickness Description not for Pressure Injury Full thickness 02/22/21 0909   Number of days: 219             Estimated Blood Loss:  Minimal    Hemostasis Achieved:  by pressure    Procedural Pain:  0  / 10     Post Procedural Pain:  0 / 10     Response to treatment:  Well tolerated by patient. Plan:     Problem List Items Addressed This Visit     Right foot ulcer, with fat layer exposed ([address] (Chronic)    Relevant Orders    Supply: Wound Cleanser    Supply: Corine Wound    Supply: Pack Wound    Supply: Cover and Secure    * (Principal) Diabetic ulcer of right midfoot associated with type 2 diabetes mellitus, with fat layer exposed ([address] (Chronic)    PVD (peripheral vascular disease) ([address]    Relevant Orders    Supply: Wound Cleanser    Supply: Corine Wound    Supply: Pack Wound    Supply: Cover and Secure    Cigarette nicotine dependence with nicotine-induced disorder    Relevant Orders    Supply: Wound Cleanser    Supply: Corine Wound    Supply: Pack Wound    Supply: Cover and Secure    Ulcer of toe of left foot, with fat layer exposed ([address] - Primary    Relevant Orders    Supply: Wound Cleanser    Supply: Corine Wound    Supply: Pack Wound    Supply: Cover and Secure          F/F and appropriate shoewear    Treatment Note please see attached Discharge Instructions    In my professional opinion this patient would benefit from HBO Therapy: No    Written patient dismissal instructions given to patient and signed by patient or POA.          Discharge [address] and Hyperbaric Oxygen Therapy   Physician Orders and Discharge Instructions  [address]  Telephone: [phone]    NAME:  Cindy Mullen Feli  YOB: 1965  MEDICAL RECORD NUMBER:  [id number]  DATE:  2/22/2021    Discharge condition: Stable    Discharge to: Home    Left via:Private automobile    Accompanied by: Self    ECF/HHA: [facility] . .. [phone] p  [phone] f    Dressing orders  Right Foot:  Wash right foot wounds with soap and water, rinse well, pat dry. Apply Xeroform to open wounds, dry gauze, secure with roll gauze. Change daily. Felt and Foam with cutout for the wound  Main mode of transportation should be wheelchair. Treatment Orders  Protein rich diet (unless restricted by your physician)  Multivitamin daily  Elevate legs when sitting    [address] [place] [address],[address] follow up visit _____________1 week________________  (Please note your next appointment above and if you are unable to keep, kindly give a 24 hour notice. Thank you.)    If you experience any of the following, please call the [facility] during business hours:    * Increase in Pain  * Temperature over 101  * Increase in drainage from your wound  * Drainage with a foul odor  * Bleeding  * Increase in swelling  * Need for compression bandage changes due to slippage, breakthrough drainage. If you need medical attention outside of the business hours of the [facility] please contact your PCP or go to the nearest emergency room.         Electronically signed by Sunil Cortes MD on 2/22/2021 at 9:39 AM

## 2021-03-01 ENCOUNTER — HOSPITAL ENCOUNTER (OUTPATIENT)
Dept: WOUND CARE | Age: 56
Discharge: HOME | End: 2021-03-01
Payer: MEDICARE

## 2021-03-01 VITALS — HEIGHT: 75 IN | WEIGHT: 229 LBS | BODY MASS INDEX: 28.47 KG/M2

## 2021-03-01 DIAGNOSIS — L97.412 DIABETIC ULCER OF RIGHT MIDFOOT ASSOCIATED WITH TYPE 2 DIABETES MELLITUS, WITH FAT LAYER EXPOSED (HCC): Primary | ICD-10-CM

## 2021-03-01 DIAGNOSIS — L97.522 ULCER OF TOE OF LEFT FOOT, WITH FAT LAYER EXPOSED (HCC): ICD-10-CM

## 2021-03-01 DIAGNOSIS — I73.9 PVD (PERIPHERAL VASCULAR DISEASE) (HCC): ICD-10-CM

## 2021-03-01 DIAGNOSIS — F17.219 CIGARETTE NICOTINE DEPENDENCE WITH NICOTINE-INDUCED DISORDER: ICD-10-CM

## 2021-03-01 DIAGNOSIS — E11.621 DIABETIC ULCER OF RIGHT MIDFOOT ASSOCIATED WITH TYPE 2 DIABETES MELLITUS, WITH FAT LAYER EXPOSED (HCC): Primary | ICD-10-CM

## 2021-03-01 DIAGNOSIS — L97.512 RIGHT FOOT ULCER, WITH FAT LAYER EXPOSED (HCC): ICD-10-CM

## 2021-03-01 PROCEDURE — 97597 DBRDMT OPN WND 1ST 20 CM/<: CPT | Performed by: SURGERY

## 2021-03-01 PROCEDURE — 97597 DBRDMT OPN WND 1ST 20 CM/<: CPT

## 2021-03-01 NOTE — PROGRESS NOTES
[address]   Progress Note and Procedure Note      Evaristo Persaud  MEDICAL RECORD NUMBER:  [id number]  AGE: 50 y.o. GENDER: male  : 1965  EPISODE DATE:  3/1/2021    Subjective:     Chief Complaint   Patient presents with    Wound Check     follow up         HISTORY of PRESENT ILLNESS HPI     Evaristo Persaud is a 50 y.o. male who presents today for wound/ulcer evaluation. Wound Context: Pt with R foot wound here for eval/treat  Wound/Ulcer Pain Timing/Severity: none  Quality of pain: N/A  Severity:  0 / 10   Modifying Factors: None  Associated Signs/Symptoms: none    Ulcer Identification:  Ulcer Type: pressure  Contributing Factors: diabetes, chronic pressure and shear force    Wound: DM        PAST MEDICAL HISTORY        Diagnosis Date    Anxiety     Diabetes ([facility]     Schizophrenic disorder ([facility]        PAST SURGICAL HISTORY    Past Surgical History:   Procedure Laterality Date    TOE AMPUTATION Left 2020    4TH AND 5TH OPEN TOE AMPUTATION performed by Miley Manning MD at [address] [address]    History reviewed. No pertinent family history.     SOCIAL HISTORY    Social History     Tobacco Use    Smoking status: Former Smoker     Packs/day: 0.00     Types: Cigars    Smokeless tobacco: Never Used    Tobacco comment: Had to quit can not smoke, can not go outside   Substance Use Topics    Alcohol use: Not Currently    Drug use: Never       ALLERGIES    Allergies   Allergen Reactions    Clozaril [Clozapine]     Haldol [Haloperidol]     Lithium     Navane [Thiothixene]        MEDICATIONS    Current Outpatient Medications on File Prior to Encounter   Medication Sig Dispense Refill    CHOLECALCIFEROL PO Take 1,000 Units by mouth daily      polyethylene glycol (MIRALAX) 17 g PACK packet Take 17 g by mouth daily as needed      acetaminophen (TYLENOL) 650 MG suppository Place 650 mg rectally every 6 hours as needed for Fever or Pain      acetaminophen (TYLENOL) 325 MG tablet Take 650 mg by mouth every 4 hours as needed for Pain      famotidine (PEPCID) 20 MG tablet Take 20 mg by mouth daily      risperiDONE (RISPERDAL) 2 MG tablet Take 2 mg by mouth daily      ibuprofen (ADVIL;MOTRIN) 400 MG tablet Take 400 mg by mouth every 6 hours as needed for Pain or Fever      loperamide (IMODIUM) 2 MG capsule Take 2 mg by mouth 4 times daily as needed for Diarrhea      FLUoxetine (PROZAC) 40 MG capsule Take 40 mg by mouth nightly      OLANZapine zydis (ZYPREXA) 20 MG disintegrating tablet Take 20 mg by mouth nightly      insulin glargine (BASAGLAR KWIKPEN) 100 UNIT/ML injection pen Inject 100 Units into the skin nightly       hydrOXYzine (ATARAX) 25 MG tablet Take 25 mg by mouth 3 times daily as needed for Itching      risperiDONE (RISPERDAL) 4 MG tablet Take 4 mg by mouth nightly      simvastatin (ZOCOR) 20 MG tablet Take 20 mg by mouth nightly       No current facility-administered medications on file prior to encounter. REVIEW OF SYSTEMS    A comprehensive review of systems was negative.     Objective:      Ht 6' 3\" (1.905 m)   Wt 229 lb (103.9 kg)   BMI 28.62 kg/m²     Wt Readings from Last 3 Encounters:   03/01/21 229 lb (103.9 kg)   02/22/21 229 lb (103.9 kg)   02/08/21 229 lb (103.9 kg)       PHYSICAL EXAM    General Appearance: alert and oriented to person, place and time, well developed and well- nourished, in no acute distress  Skin: warm and dry, no rash or erythema  Head: normocephalic and atraumatic  Eyes: pupils equal, round, and reactive to light, extraocular eye movements intact, conjunctivae normal  ENT: tympanic membrane, external ear and ear canal normal bilaterally, nose without deformity, nasal mucosa and turbinates normal without polyps, lips teeth and gums normal  Neck: supple and non-tender without mass, no thyromegaly or thyroid nodules, no cervical lymphadenopathy  Pulmonary/Chest: clear to auscultation bilaterally- no wheezes, rales or rhonchi, normal air movement, no respiratory distress  Cardiovascular: normal rate, regular rhythm, normal S1 and S2, no murmurs, rubs, clicks, or gallops, distal pulses intact, no carotid bruits  Abdomen: soft, non-tender, non-distended, normal bowel sounds, no masses or organomegaly  Extremities: no cyanosis, clubbing or edema  Musculoskeletal: normal range of motion, no joint swelling, deformity or tenderness  Neurologic: reflexes normal and symmetric, no cranial nerve deficit, gait, coordination and speech normal, sensation of skin normal      Assessment:      Problem List Items Addressed This Visit     * (Principal) Right foot ulcer, with fat layer exposed ([facility]) (Chronic)    Relevant Orders    Supply: Wound Cleanser    Supply: Corine Wound    Supply: Wound Dressings    Supply: Pack Wound    Supply: Cover and Secure    Diabetic ulcer of right midfoot associated with type 2 diabetes mellitus, with fat layer exposed ([address] - Primary (Chronic)    Relevant Orders    Supply: Wound Cleanser    Supply: Corine Wound    Supply: Wound Dressings    Supply: Pack Wound    Supply: Cover and Secure    PVD (peripheral vascular disease) ([facility])    Relevant Orders    Supply: Wound Cleanser    Supply: Corine Wound    Supply: Wound Dressings    Supply: Pack Wound    Supply: Cover and Secure    Cigarette nicotine dependence with nicotine-induced disorder    Relevant Orders    Supply: Wound Cleanser    Supply: Corine Wound    Supply: Wound Dressings    Supply: Pack Wound    Supply: Cover and Secure    Ulcer of toe of left foot, with fat layer exposed ([address]    Relevant Orders    Supply: Wound Cleanser    Supply: Corine Wound    Supply: Wound Dressings    Supply: Pack Wound    Supply: Cover and Secure           Procedure Note  Indications:  Based on my examination of this patient's wound(s)/ulcer(s) today, debridement is required to promote healing and evaluate the wound base.     Performed by: Carlton Willett MD    Consent obtained:  Yes    Time out taken:  Yes    Pain Control: Anesthetic  Anesthetic: 2% Lidocaine Gel Topical       Debridement:Non-excisional Debridement    Using curette the wound(s)/ulcer(s) was/were sharply debrided down through and including the removal of epidermis and dermis.         Devitalized Tissue Debrided:  fibrin, biofilm, slough, necrotic/eschar, exudate and callus      Pre Debridement Measurements:  Are located in the Wound/Ulcer Documentation Flow Sheet    Wound/Ulcer #: 1    Percent of Wound(s)/Ulcer(s) Debrided: 100%    Total Surface Area Debrided:  1 sq cm       Diabetic/Pressure/Non Pressure Ulcers only:  Ulcer: Diabetic ulcer, fat layer exposed           Post Debridement Measurements:    Wound/Ulcer Descriptions are Pre Debridement --EXCEPT MEASUREMENTS    Wound 07/17/20 Foot Plantar;Right Wound 2, Diabetic Lan 1, R. 1st Metatarsal Pad (Active)   Wound Image   03/01/21 0830   Wound Etiology Diabetic Lan 1 03/01/21 0830   Dressing Status New drainage noted 03/01/21 0830   Wound Cleansed Cleansed with saline 03/01/21 0830   Dressing/Treatment Xeroform 02/22/21 1004   Offloading for Diabetic Foot Ulcers Felt or foam 02/08/21 1700   Wound Length (cm) 1 cm 03/01/21 0830   Wound Width (cm) 1 cm 03/01/21 0830   Wound Depth (cm) 0.1 cm 03/01/21 0830   Wound Surface Area (cm^2) 1 cm^2 03/01/21 0830   Change in Wound Size % (l*w) -177.78 03/01/21 0830   Wound Volume (cm^3) 0.1 cm^3 03/01/21 0830   Wound Healing % -150 03/01/21 0830   Post-Procedure Length (cm) 1 cm 03/01/21 0842   Post-Procedure Width (cm) 1 cm 03/01/21 0842   Post-Procedure Depth (cm) 0.1 cm 03/01/21 0842   Post-Procedure Surface Area (cm^2) 1 cm^2 03/01/21 0842   Post-Procedure Volume (cm^3) 0.1 cm^3 03/01/21 0842   Distance Tunneling (cm) 0 cm 03/01/21 0830   Tunneling Position ___ O'Clock 0 03/01/21 0830   Undermining Starts ___ O'Clock 0 03/01/21 0830   Undermining Ends___ O'Clock 0 03/01/21 0830   Undermining Maxium Distance (cm) 0 03/01/21 0830   Wound Assessment Pink/red;Slough 03/01/21 0830   Drainage Amount Small 03/01/21 0830   Drainage Description Serosanguinous 03/01/21 0830   Odor None 03/01/21 0830   Corine-wound Assessment Hyperkeratosis (callous) 03/01/21 0830   Margins Unattached edges 03/01/21 0830   Wound Thickness Description not for Pressure Injury Full thickness 03/01/21 0830   Number of days: 226             Estimated Blood Loss:  Minimal    Hemostasis Achieved:  by pressure    Procedural Pain:  0  / 10     Post Procedural Pain:  0 / 10     Response to treatment:  Well tolerated by patient.          Plan:     Problem List Items Addressed This Visit     * (Principal) Right foot ulcer, with fat layer exposed ([address] (Chronic)    Relevant Orders    Supply: Wound Cleanser    Supply: Corine Wound    Supply: Wound Dressings    Supply: Pack Wound    Supply: Cover and Secure    Diabetic ulcer of right midfoot associated with type 2 diabetes mellitus, with fat layer exposed ([address] - Primary (Chronic)    Relevant Orders    Supply: Wound Cleanser    Supply: Corine Wound    Supply: Wound Dressings    Supply: Pack Wound    Supply: Cover and Secure    PVD (peripheral vascular disease) (HCC)    Relevant Orders    Supply: Wound Cleanser    Supply: Corine Wound    Supply: Wound Dressings    Supply: Pack Wound    Supply: Cover and Secure    Cigarette nicotine dependence with nicotine-induced disorder    Relevant Orders    Supply: Wound Cleanser    Supply: Corine Wound    Supply: Wound Dressings    Supply: Pack Wound    Supply: Cover and Secure    Ulcer of toe of left foot, with fat layer exposed (HCC)    Relevant Orders    Supply: Wound Cleanser    Supply: Corine Wound    Supply: Wound Dressings    Supply: Pack Wound    Supply: Cover and Secure          Wound improved cont F/F    Treatment Note please see attached Discharge Instructions    In my professional opinion this patient would benefit from HBO Therapy: No    Written patient dismissal instructions given to patient and signed by patient or POA. Discharge [address] and Hyperbaric Oxygen Therapy   Physician Orders and Discharge Instructions  [address] [facility]  [address]  Telephone: [phone]    NAME:  Asa Terry:  1965  MEDICAL RECORD NUMBER:  [id number]  DATE:  3/1/2021    Discharge condition: Stable    Discharge to: Home    Left via:Private automobile    Accompanied by: Self     ECF/HHA: [facility] . .. [phone] p  [phone] f     Dressing orders  Right Foot:  Wash right foot wounds with soap and water, rinse well, pat dry. Apply Xeroform to open wounds, dry gauze, secure with roll gauze. Change daily. Felt and Foam with cutout for the wound  Main mode of transportation should be wheelchair.     Treatment Orders  Protein rich diet (unless restricted by your physician)  Multivitamin daily  Elevate legs when sitting    [address] [place] [address],[address] follow up visit ____________1 week_________________  (Please note your next appointment above and if you are unable to keep, kindly give a 24 hour notice. Thank you.)    If you experience any of the following, please call the [address] during business hours:    * Increase in Pain  * Temperature over 101  * Increase in drainage from your wound  * Drainage with a foul odor  * Bleeding  * Increase in swelling  * Need for compression bandage changes due to slippage, breakthrough drainage. If you need medical attention outside of the business hours of the [address] please contact your PCP or go to the nearest emergency room.         Electronically signed by Wellington Hein MD on 3/1/2021 at 8:55 AM

## 2021-03-08 ENCOUNTER — HOSPITAL ENCOUNTER (OUTPATIENT)
Dept: WOUND CARE | Age: 56
Discharge: HOME | End: 2021-03-08
Payer: MEDICARE

## 2021-03-08 VITALS
WEIGHT: 229 LBS | HEIGHT: 75 IN | HEART RATE: 70 BPM | RESPIRATION RATE: 16 BRPM | TEMPERATURE: 97.2 F | BODY MASS INDEX: 28.47 KG/M2 | SYSTOLIC BLOOD PRESSURE: 120 MMHG | DIASTOLIC BLOOD PRESSURE: 80 MMHG

## 2021-03-08 DIAGNOSIS — L97.412 DIABETIC ULCER OF RIGHT MIDFOOT ASSOCIATED WITH TYPE 2 DIABETES MELLITUS, WITH FAT LAYER EXPOSED (HCC): ICD-10-CM

## 2021-03-08 DIAGNOSIS — I73.9 PVD (PERIPHERAL VASCULAR DISEASE) (HCC): ICD-10-CM

## 2021-03-08 DIAGNOSIS — L97.512 RIGHT FOOT ULCER, WITH FAT LAYER EXPOSED (HCC): ICD-10-CM

## 2021-03-08 DIAGNOSIS — E11.621 DIABETIC ULCER OF RIGHT MIDFOOT ASSOCIATED WITH TYPE 2 DIABETES MELLITUS, WITH FAT LAYER EXPOSED (HCC): ICD-10-CM

## 2021-03-08 DIAGNOSIS — F17.219 CIGARETTE NICOTINE DEPENDENCE WITH NICOTINE-INDUCED DISORDER: ICD-10-CM

## 2021-03-08 DIAGNOSIS — L97.522 ULCER OF TOE OF LEFT FOOT, WITH FAT LAYER EXPOSED (HCC): ICD-10-CM

## 2021-03-08 PROCEDURE — 97597 DBRDMT OPN WND 1ST 20 CM/<: CPT

## 2021-03-08 PROCEDURE — 97597 DBRDMT OPN WND 1ST 20 CM/<: CPT | Performed by: SURGERY

## 2021-03-08 ASSESSMENT — PAIN SCALES - GENERAL: PAINLEVEL_OUTOF10: 0

## 2021-03-08 ASSESSMENT — PAIN DESCRIPTION - PROGRESSION: CLINICAL_PROGRESSION: NOT CHANGED

## 2021-03-08 NOTE — PROGRESS NOTES
[address]   Progress Note and Procedure Note      Zain Manning  MEDICAL RECORD NUMBER:  [id number]  AGE: 49 y.o. GENDER: male  : 1965  EPISODE DATE:  3/8/2021    Subjective:     Chief Complaint   Patient presents with    Wound Check     follow up         HISTORY of PRESENT ILLNESS HPI     Zain Manning is a 49 y.o. male who presents today for wound/ulcer evaluation. Wound Context: Pt with R foot wound here for eval/treat  Wound/Ulcer Pain Timing/Severity: none  Quality of pain: N/A  Severity:  0 / 10   Modifying Factors: None  Associated Signs/Symptoms: none    Ulcer Identification:  Ulcer Type: diabetic  Contributing Factors: edema, diabetes, chronic pressure and shear force    Wound: DM        PAST MEDICAL HISTORY        Diagnosis Date    Anxiety     Diabetes ([address]     Schizophrenic disorder ([address]        PAST SURGICAL HISTORY    Past Surgical History:   Procedure Laterality Date    TOE AMPUTATION Left 2020    4TH AND 5TH OPEN TOE AMPUTATION performed by Kim Vázquez MD at [facility] [address]    History reviewed. No pertinent family history.     SOCIAL HISTORY    Social History     Tobacco Use    Smoking status: Former Smoker     Packs/day: 0.00     Types: Cigars    Smokeless tobacco: Never Used    Tobacco comment: Had to quit can not smoke, can not go outside   Substance Use Topics    Alcohol use: Not Currently    Drug use: Never       ALLERGIES    Allergies   Allergen Reactions    Clozaril [Clozapine]     Haldol [Haloperidol]     Lithium     Navane [Thiothixene]        MEDICATIONS    Current Outpatient Medications on File Prior to Encounter   Medication Sig Dispense Refill    CHOLECALCIFEROL PO Take 1,000 Units by mouth daily      polyethylene glycol (MIRALAX) 17 g PACK packet Take 17 g by mouth daily as needed      acetaminophen (TYLENOL) 650 MG suppository Place 650 mg rectally every 6 hours as needed for Fever or Pain      acetaminophen (TYLENOL) 325 MG tablet Take 650 mg by mouth every 4 hours as needed for Pain      famotidine (PEPCID) 20 MG tablet Take 20 mg by mouth daily      risperiDONE (RISPERDAL) 2 MG tablet Take 2 mg by mouth daily      ibuprofen (ADVIL;MOTRIN) 400 MG tablet Take 400 mg by mouth every 6 hours as needed for Pain or Fever      loperamide (IMODIUM) 2 MG capsule Take 2 mg by mouth 4 times daily as needed for Diarrhea      FLUoxetine (PROZAC) 40 MG capsule Take 40 mg by mouth nightly      OLANZapine zydis (ZYPREXA) 20 MG disintegrating tablet Take 20 mg by mouth nightly      insulin glargine (BASAGLAR KWIKPEN) 100 UNIT/ML injection pen Inject 100 Units into the skin nightly       hydrOXYzine (ATARAX) 25 MG tablet Take 25 mg by mouth 3 times daily as needed for Itching      risperiDONE (RISPERDAL) 4 MG tablet Take 4 mg by mouth nightly      simvastatin (ZOCOR) 20 MG tablet Take 20 mg by mouth nightly       No current facility-administered medications on file prior to encounter. REVIEW OF SYSTEMS    A comprehensive review of systems was negative.     Objective:      /80   Pulse 70   Temp 97.2 °F (36.2 °C) (Temporal)   Resp 16   Ht 6' 3\" (1.905 m)   Wt 229 lb (103.9 kg)   BMI 28.62 kg/m²     Wt Readings from Last 3 Encounters:   03/08/21 229 lb (103.9 kg)   03/01/21 229 lb (103.9 kg)   02/22/21 229 lb (103.9 kg)       PHYSICAL EXAM    General Appearance: alert and oriented to person, place and time, well developed and well- nourished, in no acute distress  Skin: warm and dry, no rash or erythema  Head: normocephalic and atraumatic  Eyes: pupils equal, round, and reactive to light, extraocular eye movements intact, conjunctivae normal  ENT: tympanic membrane, external ear and ear canal normal bilaterally, nose without deformity, nasal mucosa and turbinates normal without polyps, lips teeth and gums normal  Neck: supple and non-tender without mass, no thyromegaly or thyroid nodules, no cervical lymphadenopathy  Pulmonary/Chest: clear to auscultation bilaterally- no wheezes, rales or rhonchi, normal air movement, no respiratory distress  Cardiovascular: normal rate, regular rhythm, normal S1 and S2, no murmurs, rubs, clicks, or gallops, distal pulses intact, no carotid bruits  Abdomen: soft, non-tender, non-distended, normal bowel sounds, no masses or organomegaly  Extremities: no cyanosis, clubbing or edema  Musculoskeletal: normal range of motion, no joint swelling, deformity or tenderness  Neurologic: reflexes normal and symmetric, no cranial nerve deficit, gait, coordination and speech normal, sensation of skin normal      Assessment:      Problem List Items Addressed This Visit     Right foot ulcer, with fat layer exposed ([address] (Chronic)    * (Principal) Diabetic ulcer of right midfoot associated with type 2 diabetes mellitus, with fat layer exposed (HCC) (Chronic)    PVD (peripheral vascular disease) (HCC)    Cigarette nicotine dependence with nicotine-induced disorder    Ulcer of toe of left foot, with fat layer exposed ([address]           Procedure Note  Indications:  Based on my examination of this patient's wound(s)/ulcer(s) today, debridement is required to promote healing and evaluate the wound base. Performed by: Lary Valdez MD    Consent obtained:  Yes    Time out taken:  Yes    Pain Control:         Debridement:Non-excisional Debridement    Using curette the wound(s)/ulcer(s) was/were sharply debrided down through and including the removal of epidermis and dermis.         Devitalized Tissue Debrided:  fibrin, biofilm, slough, necrotic/eschar and exudate      Pre Debridement Measurements:  Are located in the Wound/Ulcer Documentation Flow Sheet    Wound/Ulcer #: 2    Percent of Wound(s)/Ulcer(s) Debrided: 100%    Total Surface Area Debrided:  0.3 sq cm       Diabetic/Pressure/Non Pressure Ulcers only:  Ulcer: Diabetic ulcer, fat layer exposed           Post Debridement Measurements:    Wound/Ulcer Descriptions are Pre Debridement --EXCEPT MEASUREMENTS    Wound 07/17/20 Foot Plantar;Right Wound 2, Diabetic Lan 1, R. 1st Metatarsal Pad (Active)   Wound Image   03/08/21 0926   Wound Etiology Diabetic Lan 1 03/08/21 0926   Dressing Status New drainage noted 03/08/21 0926   Wound Cleansed Cleansed with saline 03/08/21 0926   Dressing/Treatment Xeroform;Gauze dressing/dressing sponge;Roll gauze;Tape/Soft cloth adhesive tape 03/01/21 0922   Offloading for Diabetic Foot Ulcers Felt or foam 03/01/21 0922   Wound Length (cm) 1 cm 03/08/21 0926   Wound Width (cm) 0.3 cm 03/08/21 0926   Wound Depth (cm) 0.1 cm 03/08/21 0926   Wound Surface Area (cm^2) 0.3 cm^2 03/08/21 0926   Change in Wound Size % (l*w) 16.67 03/08/21 0926   Wound Volume (cm^3) 0.03 cm^3 03/08/21 0926   Wound Healing % 25 03/08/21 0926   Post-Procedure Length (cm) 1 cm 03/08/21 0957   Post-Procedure Width (cm) 0.3 cm 03/08/21 0957   Post-Procedure Depth (cm) 0.1 cm 03/08/21 0957   Post-Procedure Surface Area (cm^2) 0.3 cm^2 03/08/21 0957   Post-Procedure Volume (cm^3) 0.03 cm^3 03/08/21 0957   Distance Tunneling (cm) 0 cm 03/08/21 0926   Tunneling Position ___ O'Clock 0 03/08/21 0926   Undermining Starts ___ O'Clock 0 03/08/21 0926   Undermining Ends___ O'Clock 0 03/08/21 0926   Undermining Maxium Distance (cm) 0 03/08/21 0926   Wound Assessment Pink/red;Slough 03/08/21 0926   Drainage Amount Small 03/08/21 0926   Drainage Description Serosanguinous 03/08/21 0926   Odor None 03/08/21 0926   Corine-wound Assessment Hyperkeratosis (callous) 03/08/21 0926   Margins Unattached edges 03/08/21 0926   Wound Thickness Description not for Pressure Injury Full thickness 03/08/21 0926   Number of days: 233             Estimated Blood Loss:  Minimal    Hemostasis Achieved:  by pressure    Procedural Pain:  0  / 10     Post Procedural Pain:  0 / 10     Response to treatment:  Well tolerated by patient.          Plan:     Problem List a foul odor  * Bleeding  * Increase in swelling  * Need for compression bandage changes due to slippage, breakthrough drainage. If you need medical attention outside of the business hours of the [address] [place] [address] Road please contact your PCP or go to the nearest emergency room.         Electronically signed by Jose Vega MD on 3/8/2021 at 9:58 AM

## 2021-03-15 ENCOUNTER — HOSPITAL ENCOUNTER (OUTPATIENT)
Dept: WOUND CARE | Age: 56
Discharge: HOME | End: 2021-03-15
Payer: MEDICARE

## 2021-03-15 VITALS
SYSTOLIC BLOOD PRESSURE: 118 MMHG | RESPIRATION RATE: 16 BRPM | BODY MASS INDEX: 28.47 KG/M2 | HEART RATE: 81 BPM | TEMPERATURE: 98 F | WEIGHT: 229 LBS | HEIGHT: 75 IN | DIASTOLIC BLOOD PRESSURE: 69 MMHG

## 2021-03-15 DIAGNOSIS — L97.512 RIGHT FOOT ULCER, WITH FAT LAYER EXPOSED (HCC): ICD-10-CM

## 2021-03-15 DIAGNOSIS — L97.522 ULCER OF TOE OF LEFT FOOT, WITH FAT LAYER EXPOSED (HCC): ICD-10-CM

## 2021-03-15 DIAGNOSIS — L97.412 DIABETIC ULCER OF RIGHT MIDFOOT ASSOCIATED WITH TYPE 2 DIABETES MELLITUS, WITH FAT LAYER EXPOSED (HCC): Primary | ICD-10-CM

## 2021-03-15 DIAGNOSIS — I73.9 PVD (PERIPHERAL VASCULAR DISEASE) (HCC): ICD-10-CM

## 2021-03-15 DIAGNOSIS — E11.621 DIABETIC ULCER OF RIGHT MIDFOOT ASSOCIATED WITH TYPE 2 DIABETES MELLITUS, WITH FAT LAYER EXPOSED (HCC): Primary | ICD-10-CM

## 2021-03-15 DIAGNOSIS — F17.219 CIGARETTE NICOTINE DEPENDENCE WITH NICOTINE-INDUCED DISORDER: ICD-10-CM

## 2021-03-15 PROCEDURE — 97597 DBRDMT OPN WND 1ST 20 CM/<: CPT | Performed by: SURGERY

## 2021-03-15 PROCEDURE — 97597 DBRDMT OPN WND 1ST 20 CM/<: CPT

## 2021-03-15 ASSESSMENT — PAIN SCALES - GENERAL: PAINLEVEL_OUTOF10: 0

## 2021-03-15 ASSESSMENT — PAIN DESCRIPTION - ONSET: ONSET: ON-GOING

## 2021-03-15 NOTE — PROGRESS NOTES
[address]   Progress Note and Procedure Note      Evan Whatley  MEDICAL RECORD NUMBER:  [id number]  AGE: 49 y.o. GENDER: male  : 1965  EPISODE DATE:  3/15/2021    Subjective:     Chief Complaint   Patient presents with    Wound Check     follow up         HISTORY of PRESENT ILLNESS HPI     Evan Whatley is a 49 y.o. male who presents today for wound/ulcer evaluation. Wound Context: Pt with R plantar foot wound here for eval/treat  Wound/Ulcer Pain Timing/Severity: none  Quality of pain: N/A  Severity:  0 / 10   Modifying Factors: None  Associated Signs/Symptoms: none    Ulcer Identification:  Ulcer Type: diabetic and pressure  Contributing Factors: diabetes, chronic pressure and shear force    Wound: DM        PAST MEDICAL HISTORY        Diagnosis Date    Anxiety     [facility]     Schizophrenic disorder ([facility]        PAST SURGICAL HISTORY    Past Surgical History:   Procedure Laterality Date    TOE AMPUTATION Left 2020    4TH AND 5TH OPEN TOE AMPUTATION performed by Mark Crook MD at [facility] [address]    History reviewed. No pertinent family history.     SOCIAL HISTORY    Social History     Tobacco Use    Smoking status: Former Smoker     Packs/day: 0.00     Types: Cigars    Smokeless tobacco: Never Used    Tobacco comment: Had to quit can not smoke, can not go outside   Substance Use Topics    Alcohol use: Not Currently    Drug use: Never       ALLERGIES    Allergies   Allergen Reactions    Clozaril [Clozapine]     Haldol [Haloperidol]     Lithium     Navane [Thiothixene]        MEDICATIONS    Current Outpatient Medications on File Prior to Encounter   Medication Sig Dispense Refill    CHOLECALCIFEROL PO Take 1,000 Units by mouth daily      polyethylene glycol (MIRALAX) 17 g PACK packet Take 17 g by mouth daily as needed      acetaminophen (TYLENOL) 650 MG suppository Place 650 mg rectally every 6 hours as needed for Fever or Pain      acetaminophen (TYLENOL) 325 MG tablet Take 650 mg by mouth every 4 hours as needed for Pain      famotidine (PEPCID) 20 MG tablet Take 20 mg by mouth daily      risperiDONE (RISPERDAL) 2 MG tablet Take 2 mg by mouth daily      ibuprofen (ADVIL;MOTRIN) 400 MG tablet Take 400 mg by mouth every 6 hours as needed for Pain or Fever      loperamide (IMODIUM) 2 MG capsule Take 2 mg by mouth 4 times daily as needed for Diarrhea      FLUoxetine (PROZAC) 40 MG capsule Take 40 mg by mouth nightly      OLANZapine zydis (ZYPREXA) 20 MG disintegrating tablet Take 20 mg by mouth nightly      insulin glargine (BASAGLAR KWIKPEN) 100 UNIT/ML injection pen Inject 100 Units into the skin nightly       hydrOXYzine (ATARAX) 25 MG tablet Take 25 mg by mouth 3 times daily as needed for Itching      risperiDONE (RISPERDAL) 4 MG tablet Take 4 mg by mouth nightly      simvastatin (ZOCOR) 20 MG tablet Take 20 mg by mouth nightly       No current facility-administered medications on file prior to encounter. REVIEW OF SYSTEMS    A comprehensive review of systems was negative.     Objective:      /69   Pulse 81   Temp 98 °F (36.7 °C) (Temporal)   Resp 16   Ht 6' 3\" (1.905 m)   Wt 229 lb (103.9 kg)   BMI 28.62 kg/m²     Wt Readings from Last 3 Encounters:   03/15/21 229 lb (103.9 kg)   03/08/21 229 lb (103.9 kg)   03/01/21 229 lb (103.9 kg)       PHYSICAL EXAM    General Appearance: alert and oriented to person, place and time, well developed and well- nourished, in no acute distress  Skin: warm and dry, no rash or erythema  Head: normocephalic and atraumatic  Eyes: pupils equal, round, and reactive to light, extraocular eye movements intact, conjunctivae normal  ENT: tympanic membrane, external ear and ear canal normal bilaterally, nose without deformity, nasal mucosa and turbinates normal without polyps, lips teeth and gums normal  Neck: supple and non-tender without mass, no thyromegaly or thyroid nodules, no cervical lymphadenopathy  Pulmonary/Chest: clear to auscultation bilaterally- no wheezes, rales or rhonchi, normal air movement, no respiratory distress  Cardiovascular: normal rate, regular rhythm, normal S1 and S2, no murmurs, rubs, clicks, or gallops, distal pulses intact, no carotid bruits  Abdomen: soft, non-tender, non-distended, normal bowel sounds, no masses or organomegaly  Extremities: no cyanosis, clubbing or edema  Musculoskeletal: normal range of motion, no joint swelling, deformity or tenderness  Neurologic: reflexes normal and symmetric, no cranial nerve deficit, gait, coordination and speech normal, sensation of skin normal      Assessment:      Problem List Items Addressed This Visit     Right foot ulcer, with fat layer exposed ([address] (Chronic)    Relevant Orders    Supply: Wound Cleanser    Supply: Corine Wound    Supply: Wound Dressings    Supply: Pack Wound    Supply: Cover and Secure    * (Principal) Diabetic ulcer of right midfoot associated with type 2 diabetes mellitus, with fat layer exposed ([address] - Primary (Chronic)    Relevant Orders    Supply: Wound Cleanser    Supply: Corine Wound    Supply: Wound Dressings    Supply: Pack Wound    Supply: Cover and Secure    PVD (peripheral vascular disease) (HCC)    Relevant Orders    Supply: Wound Cleanser    Supply: Corine Wound    Supply: Wound Dressings    Supply: Pack Wound    Supply: Cover and Secure    Cigarette nicotine dependence with nicotine-induced disorder    Relevant Orders    Supply: Wound Cleanser    Supply: Corine Wound    Supply: Wound Dressings    Supply: Pack Wound    Supply: Cover and Secure    Ulcer of toe of left foot, with fat layer exposed ([address]    Relevant Orders    Supply: Wound Cleanser    Supply: Corine Wound    Supply: Wound Dressings    Supply: Pack Wound    Supply: Cover and Secure           Procedure Note  Indications:  Based on my examination of this patient's wound(s)/ulcer(s) today, debridement is required to promote healing and evaluate the wound base. Performed by: Osiel London MD    Consent obtained:  Yes    Time out taken:  Yes    Pain Control: Anesthetic  Anesthetic: 2% Lidocaine Gel Topical       Debridement:Non-excisional Debridement    Using curette the wound(s)/ulcer(s) was/were sharply debrided down through and including the removal of epidermis and dermis.         Devitalized Tissue Debrided:  fibrin, biofilm, slough, necrotic/eschar, exudate and callus      Pre Debridement Measurements:  Are located in the Wound/Ulcer Documentation Flow Sheet    Wound/Ulcer #: 1    Percent of Wound(s)/Ulcer(s) Debrided: 100%    Total Surface Area Debrided:  0.14 sq cm       Diabetic/Pressure/Non Pressure Ulcers only:  Ulcer: Diabetic ulcer, fat layer exposed           Post Debridement Measurements:    Wound/Ulcer Descriptions are Pre Debridement --EXCEPT MEASUREMENTS    Wound 07/17/20 Foot Plantar;Right Wound 2, Diabetic Lan 1, R. 1st Metatarsal Pad (Active)   Wound Image   03/15/21 0953   Wound Etiology Diabetic Lan 1 03/15/21 0953   Dressing Status New drainage noted 03/15/21 0953   Wound Cleansed Cleansed with saline 03/08/21 0926   Dressing/Treatment Xeroform 03/08/21 1043   Offloading for Diabetic Foot Ulcers Felt or foam 03/01/21 0922   Wound Length (cm) 0.7 cm 03/15/21 0953   Wound Width (cm) 0.2 cm 03/15/21 0953   Wound Depth (cm) 0.1 cm 03/15/21 0953   Wound Surface Area (cm^2) 0.14 cm^2 03/15/21 0953   Change in Wound Size % (l*w) 61.11 03/15/21 0953   Wound Volume (cm^3) 0.01 cm^3 03/15/21 0953   Wound Healing % 75 03/15/21 0953   Post-Procedure Length (cm) 0.7 cm 03/15/21 1017   Post-Procedure Width (cm) 0.2 cm 03/15/21 1017   Post-Procedure Depth (cm) 0.1 cm 03/15/21 1017   Post-Procedure Surface Area (cm^2) 0.14 cm^2 03/15/21 1017   Post-Procedure Volume (cm^3) 0.01 cm^3 03/15/21 1017   Distance Tunneling (cm) 0 cm 03/15/21 0953   Tunneling Position ___ O'Clock 0 03/15/21 0953   Undermining Starts ___ O'Clock 0 this patient would benefit from HBO Therapy: No    Written patient dismissal instructions given to patient and signed by patient or POA. Discharge [address] and Hyperbaric Oxygen Therapy   Physician Orders and Discharge Instructions  [facility] [place] [place]   Telephone: [phone]    NAME:  Jae Cushing:  1965  MEDICAL RECORD NUMBER:  [id number]  DATE:  3/15/2021    Discharge condition: Stable    Discharge to: Home    Left via:Private automobile    Accompanied by: Self    ECF/HHA: [facility] . .. [phone] p  [phone] f     Dressing orders: Right Foot:  Wash right foot wounds with soap and water, rinse well, pat dry. Apply Xeroform to open wounds, dry gauze, secure with roll gauze. Change daily. Felt and Foam with cutout for the wound  Main mode of transportation should be wheelchair.     Treatment Orders  Protein rich diet (unless restricted by your physician)  Multivitamin daily  Elevate legs when sitting    [address] [place] [address],[address] follow up visit _____________________________  (Please note your next appointment above and if you are unable to keep, kindly give a 24 hour notice. Thank you.)          If you experience any of the following, please call the [address] [place] [address] during business hours:    * Increase in Pain  * Temperature over 101  * Increase in drainage from your wound  * Drainage with a foul odor  * Bleeding  * Increase in swelling  * Need for compression bandage changes due to slippage, breakthrough drainage. If you need medical attention outside of the business hours of the [address] [place] [address] Road please contact your PCP or go to the nearest emergency room.         Electronically signed by Maura Perez MD on 3/15/2021 at 10:26 AM

## 2021-03-22 ENCOUNTER — HOSPITAL ENCOUNTER (OUTPATIENT)
Dept: WOUND CARE | Age: 56
Discharge: HOME | End: 2021-03-22
Payer: MEDICARE

## 2021-03-22 VITALS — BODY MASS INDEX: 28.47 KG/M2 | WEIGHT: 229 LBS | HEIGHT: 75 IN

## 2021-03-22 DIAGNOSIS — L97.522 ULCER OF TOE OF LEFT FOOT, WITH FAT LAYER EXPOSED (HCC): ICD-10-CM

## 2021-03-22 DIAGNOSIS — I73.9 PVD (PERIPHERAL VASCULAR DISEASE) (HCC): ICD-10-CM

## 2021-03-22 DIAGNOSIS — F17.219 CIGARETTE NICOTINE DEPENDENCE WITH NICOTINE-INDUCED DISORDER: ICD-10-CM

## 2021-03-22 DIAGNOSIS — E11.621 DIABETIC ULCER OF RIGHT MIDFOOT ASSOCIATED WITH TYPE 2 DIABETES MELLITUS, WITH FAT LAYER EXPOSED (HCC): Primary | ICD-10-CM

## 2021-03-22 DIAGNOSIS — L97.412 DIABETIC ULCER OF RIGHT MIDFOOT ASSOCIATED WITH TYPE 2 DIABETES MELLITUS, WITH FAT LAYER EXPOSED (HCC): Primary | ICD-10-CM

## 2021-03-22 DIAGNOSIS — L97.512 RIGHT FOOT ULCER, WITH FAT LAYER EXPOSED (HCC): ICD-10-CM

## 2021-03-22 PROCEDURE — 97597 DBRDMT OPN WND 1ST 20 CM/<: CPT | Performed by: SURGERY

## 2021-03-22 PROCEDURE — 97597 DBRDMT OPN WND 1ST 20 CM/<: CPT

## 2021-03-22 NOTE — PROGRESS NOTES
[address]   Progress Note and Procedure Note      Herbert Enriquez  MEDICAL RECORD NUMBER:  [id number]  AGE: 49 y.o. GENDER: male  : 1965  EPISODE DATE:  3/22/2021    Subjective:     Chief Complaint   Patient presents with    Wound Check     follow up         HISTORY of PRESENT ILLNESS HPI     Herbert Enriquez is a 49 y.o. male who presents today for wound/ulcer evaluation. Wound Context: Pt with R great toe plantar wound here for eval/treat  Wound/Ulcer Pain Timing/Severity: none  Quality of pain: N/A  Severity:  0 / 10   Modifying Factors: None  Associated Signs/Symptoms: edema    Ulcer Identification:  Ulcer Type: diabetic  Contributing Factors: diabetes and shear force    Wound: DM        PAST MEDICAL HISTORY        Diagnosis Date    Anxiety     Diabetes ([facility]     Schizophrenic disorder ([facility] [address] [facility]        PAST SURGICAL HISTORY    Past Surgical History:   Procedure Laterality Date    TOE AMPUTATION Left 2020    4TH AND 5TH OPEN TOE AMPUTATION performed by Nathalie Mcnair MD at [facility] [address]    History reviewed. No pertinent family history.     SOCIAL HISTORY    Social History     Tobacco Use    Smoking status: Former Smoker     Packs/day: 0.00     Types: Cigars    Smokeless tobacco: Never Used    Tobacco comment: Had to quit can not smoke, can not go outside   Substance Use Topics    Alcohol use: Not Currently    Drug use: Never       ALLERGIES    Allergies   Allergen Reactions    Clozaril [Clozapine]     Haldol [Haloperidol]     Lithium     Navane [Thiothixene]        MEDICATIONS    Current Outpatient Medications on File Prior to Encounter   Medication Sig Dispense Refill    CHOLECALCIFEROL PO Take 1,000 Units by mouth daily      polyethylene glycol (MIRALAX) 17 g PACK packet Take 17 g by mouth daily as needed      acetaminophen (TYLENOL) 650 MG suppository Place 650 mg rectally every 6 hours as needed for Fever or Pain      acetaminophen (TYLENOL) 325 MG tablet Take 650 mg by mouth every 4 hours as needed for Pain      famotidine (PEPCID) 20 MG tablet Take 20 mg by mouth daily      risperiDONE (RISPERDAL) 2 MG tablet Take 2 mg by mouth daily      ibuprofen (ADVIL;MOTRIN) 400 MG tablet Take 400 mg by mouth every 6 hours as needed for Pain or Fever      loperamide (IMODIUM) 2 MG capsule Take 2 mg by mouth 4 times daily as needed for Diarrhea      FLUoxetine (PROZAC) 40 MG capsule Take 40 mg by mouth nightly      OLANZapine zydis (ZYPREXA) 20 MG disintegrating tablet Take 20 mg by mouth nightly      insulin glargine (BASAGLAR KWIKPEN) 100 UNIT/ML injection pen Inject 100 Units into the skin nightly       hydrOXYzine (ATARAX) 25 MG tablet Take 25 mg by mouth 3 times daily as needed for Itching      risperiDONE (RISPERDAL) 4 MG tablet Take 4 mg by mouth nightly      simvastatin (ZOCOR) 20 MG tablet Take 20 mg by mouth nightly       No current facility-administered medications on file prior to encounter. REVIEW OF SYSTEMS    A comprehensive review of systems was negative.     Objective:      Ht 6' 3\" (1.905 m)   Wt 229 lb (103.9 kg)   BMI 28.62 kg/m²     Wt Readings from Last 3 Encounters:   03/22/21 229 lb (103.9 kg)   03/15/21 229 lb (103.9 kg)   03/08/21 229 lb (103.9 kg)       PHYSICAL EXAM    General Appearance: alert and oriented to person, place and time, well developed and well- nourished, in no acute distress  Skin: warm and dry, no rash or erythema  Head: normocephalic and atraumatic  Eyes: pupils equal, round, and reactive to light, extraocular eye movements intact, conjunctivae normal  ENT: tympanic membrane, external ear and ear canal normal bilaterally, nose without deformity, nasal mucosa and turbinates normal without polyps, lips teeth and gums normal  Neck: supple and non-tender without mass, no thyromegaly or thyroid nodules, no cervical lymphadenopathy  Pulmonary/Chest: clear to auscultation bilaterally- no wheezes, rales or rhonchi, normal air movement, no respiratory distress  Cardiovascular: normal rate, regular rhythm, normal S1 and S2, no murmurs, rubs, clicks, or gallops, distal pulses intact, no carotid bruits  Abdomen: soft, non-tender, non-distended, normal bowel sounds, no masses or organomegaly  Extremities: no cyanosis, clubbing or edema  Musculoskeletal: normal range of motion, no joint swelling, deformity or tenderness  Neurologic: reflexes normal and symmetric, no cranial nerve deficit, gait, coordination and speech normal, sensation of skin normal      Assessment:      Problem List Items Addressed This Visit     Right foot ulcer, with fat layer exposed ([address] (Chronic)    Relevant Orders    Supply: Wound Cleanser    Supply: Corine Wound    Supply: Wound Dressings    Supply: Pack Wound    Supply: Cover and Secure    * (Principal) Diabetic ulcer of right midfoot associated with type 2 diabetes mellitus, with fat layer exposed ([address] - Primary (Chronic)    Relevant Orders    Supply: Wound Cleanser    Supply: Corine Wound    Supply: Wound Dressings    Supply: Pack Wound    Supply: Cover and Secure    PVD (peripheral vascular disease) (HCC)    Relevant Orders    Supply: Wound Cleanser    Supply: Corine Wound    Supply: Wound Dressings    Supply: Pack Wound    Supply: Cover and Secure    Cigarette nicotine dependence with nicotine-induced disorder    Relevant Orders    Supply: Wound Cleanser    Supply: Corine Wound    Supply: Wound Dressings    Supply: Pack Wound    Supply: Cover and Secure    Ulcer of toe of left foot, with fat layer exposed ([address]    Relevant Orders    Supply: Wound Cleanser    Supply: Corine Wound    Supply: Wound Dressings    Supply: Pack Wound    Supply: Cover and Secure           Procedure Note  Indications:  Based on my examination of this patient's wound(s)/ulcer(s) today, debridement is required to promote healing and evaluate the wound base.     Performed by: Carlton Willett MD    Consent obtained:  Yes    Time out taken:  Yes    Pain Control: Anesthetic  Anesthetic: 2% Lidocaine Gel Topical       Debridement:Non-excisional Debridement    Using curette the wound(s)/ulcer(s) was/were sharply debrided down through and including the removal of epidermis and dermis.         Devitalized Tissue Debrided:  fibrin, biofilm, slough, necrotic/eschar, exudate and callus      Pre Debridement Measurements:  Are located in the Wound/Ulcer Documentation Flow Sheet    Wound/Ulcer #: 2    Percent of Wound(s)/Ulcer(s) Debrided: 100%    Total Surface Area Debrided:  0.07 sq cm       Diabetic/Pressure/Non Pressure Ulcers only:  Ulcer: Diabetic ulcer, fat layer exposed           Post Debridement Measurements:    Wound/Ulcer Descriptions are Pre Debridement --EXCEPT MEASUREMENTS    Wound 07/17/20 Foot Plantar;Right Wound 2, Diabetic Lan 1, R. 1st Metatarsal Pad (Active)   Wound Image   03/22/21 0918   Wound Etiology Diabetic Aln 1 03/22/21 0918   Dressing Status New drainage noted 03/22/21 0918   Wound Cleansed Cleansed with saline 03/22/21 0918   Dressing/Treatment Xeroform 03/08/21 1043   Offloading for Diabetic Foot Ulcers Felt or foam 03/01/21 0922   Wound Length (cm) 0.7 cm 03/22/21 0918   Wound Width (cm) 0.1 cm 03/22/21 0918   Wound Depth (cm) 0.1 cm 03/22/21 0918   Wound Surface Area (cm^2) 0.07 cm^2 03/22/21 0918   Change in Wound Size % (l*w) 80.56 03/22/21 0918   Wound Volume (cm^3) 0.01 cm^3 03/22/21 0918   Wound Healing % 75 03/22/21 0918   Post-Procedure Length (cm) 0.7 cm 03/22/21 0939   Post-Procedure Width (cm) 0.1 cm 03/22/21 0939   Post-Procedure Depth (cm) 0.1 cm 03/22/21 0939   Post-Procedure Surface Area (cm^2) 0.07 cm^2 03/22/21 0939   Post-Procedure Volume (cm^3) 0.01 cm^3 03/22/21 0939   Distance Tunneling (cm) 0 cm 03/22/21 0918   Tunneling Position ___ O'Clock 0 03/22/21 0918   Undermining Starts ___ O'Clock 0 03/22/21 0918   Undermining Ends___ O'Clock 0 03/22/21 0918   Undermining Maxium Distance (cm) 0 03/22/21 0918   Wound Assessment Pink/red;Slough 03/22/21 0918   Drainage Amount Small 03/22/21 0918   Drainage Description Serosanguinous 03/22/21 0918   Odor None 03/22/21 0918   Corine-wound Assessment Hyperkeratosis (callous) 03/22/21 0918   Margins Unattached edges 03/22/21 0918   Wound Thickness Description not for Pressure Injury Full thickness 03/22/21 0918   Number of days: 247             Estimated Blood Loss:  Minimal    Hemostasis Achieved:  by pressure    Procedural Pain:  0  / 10     Post Procedural Pain:  0 / 10     Response to treatment:  Well tolerated by patient.          Plan:     Problem List Items Addressed This Visit     Right foot ulcer, with fat layer exposed ([address] (Chronic)    Relevant Orders    Supply: Wound Cleanser    Supply: Corine Wound    Supply: Wound Dressings    Supply: Pack Wound    Supply: Cover and Secure    * (Principal) Diabetic ulcer of right midfoot associated with type 2 diabetes mellitus, with fat layer exposed ([address] - Primary (Chronic)    Relevant Orders    Supply: Wound Cleanser    Supply: Corine Wound    Supply: Wound Dressings    Supply: Pack Wound    Supply: Cover and Secure    PVD (peripheral vascular disease) (HCC)    Relevant Orders    Supply: Wound Cleanser    Supply: Corine Wound    Supply: Wound Dressings    Supply: Pack Wound    Supply: Cover and Secure    Cigarette nicotine dependence with nicotine-induced disorder    Relevant Orders    Supply: Wound Cleanser    Supply: Corine Wound    Supply: Wound Dressings    Supply: Pack Wound    Supply: Cover and Secure    Ulcer of toe of left foot, with fat layer exposed ([address]    Relevant Orders    Supply: Wound Cleanser    Supply: Corine Wound    Supply: Wound Dressings    Supply: Pack Wound    Supply: Cover and Secure          Cont current care    Treatment Note please see attached Discharge Instructions    In my professional opinion this patient would benefit from HBO Therapy: No    Written patient dismissal instructions given to patient and signed by patient or POA. Discharge [address] and Hyperbaric Oxygen Therapy   Physician Orders and Discharge Instructions  [address] [facility] [place]  [place], [address]  Telephone: [phone]    NAME:  Ivania Persaud:  1965  MEDICAL RECORD NUMBER:  [id number]  DATE:  3/22/2021    Discharge condition: Stable    Discharge to: Home    Left via:Private automobile    Accompanied by: Self    ECF/HHA:  [facility] . .. [phone] p  [phone] f     Dressing orders: Right Foot:  Wash right foot wounds with soap and water, rinse well, pat dry. Apply Xeroform to open wounds, dry gauze, secure with roll gauze. Change daily. Felt and Foam with cutout for the wound  Main mode of transportation should be wheelchair.     Treatment Orders  Protein rich diet (unless restricted by your physician)  Multivitamin daily  Elevate legs when sitting    [address] [place] [address],[address] follow up visit _____________________________  (Please note your next appointment above and if you are unable to keep, kindly give a 24 hour notice. Thank you.)    If you experience any of the following, please call the [facility] [address] during business hours:    * Increase in Pain  * Temperature over 101  * Increase in drainage from your wound  * Drainage with a foul odor  * Bleeding  * Increase in swelling  * Need for compression bandage changes due to slippage, breakthrough drainage. If you need medical attention outside of the business hours of the [address] please contact your PCP or go to the nearest emergency room.         Electronically signed by Minerva Pickard MD on 3/22/2021 at 9:41 AM

## 2021-03-22 NOTE — PLAN OF CARE
Problem: Wound:  Goal: Will show signs of wound healing; wound closure and no evidence of infection  Description: Will show signs of wound healing; wound closure and no evidence of infection  3/22/2021 0950 by Victoriano Roy RN  Outcome: Ongoing  3/22/2021 0917 by Shanelle Wooten RN  Outcome: Ongoing     Problem: Falls - Risk of:  Goal: Will remain free from falls  Description: Will remain free from falls  3/22/2021 0950 by Victoriano Roy RN  Outcome: Ongoing  3/22/2021 0917 by Shanelle Wooten RN  Outcome: Ongoing     Problem: Blood Glucose:  Goal: Ability to maintain appropriate glucose levels will improve  Description: Ability to maintain appropriate glucose levels will improve  3/22/2021 0950 by Victoriano Roy RN  Outcome: Ongoing  3/22/2021 0917 by Shanelle Wooten RN  Outcome: Ongoing

## 2021-04-05 ENCOUNTER — HOSPITAL ENCOUNTER (OUTPATIENT)
Dept: WOUND CARE | Age: 56
Discharge: HOME | End: 2021-04-05
Payer: MEDICARE

## 2021-04-05 VITALS
DIASTOLIC BLOOD PRESSURE: 81 MMHG | HEART RATE: 89 BPM | RESPIRATION RATE: 18 BRPM | TEMPERATURE: 96.5 F | SYSTOLIC BLOOD PRESSURE: 129 MMHG

## 2021-04-05 DIAGNOSIS — L97.412 DIABETIC ULCER OF RIGHT MIDFOOT ASSOCIATED WITH TYPE 2 DIABETES MELLITUS, WITH FAT LAYER EXPOSED (HCC): ICD-10-CM

## 2021-04-05 DIAGNOSIS — I73.9 PVD (PERIPHERAL VASCULAR DISEASE) (HCC): ICD-10-CM

## 2021-04-05 DIAGNOSIS — L97.522 ULCER OF TOE OF LEFT FOOT, WITH FAT LAYER EXPOSED (HCC): ICD-10-CM

## 2021-04-05 DIAGNOSIS — L97.512 RIGHT FOOT ULCER, WITH FAT LAYER EXPOSED (HCC): ICD-10-CM

## 2021-04-05 DIAGNOSIS — E11.621 DIABETIC ULCER OF RIGHT MIDFOOT ASSOCIATED WITH TYPE 2 DIABETES MELLITUS, WITH FAT LAYER EXPOSED (HCC): ICD-10-CM

## 2021-04-05 DIAGNOSIS — F17.219 CIGARETTE NICOTINE DEPENDENCE WITH NICOTINE-INDUCED DISORDER: ICD-10-CM

## 2021-04-05 PROCEDURE — 97597 DBRDMT OPN WND 1ST 20 CM/<: CPT

## 2021-04-05 PROCEDURE — 97597 DBRDMT OPN WND 1ST 20 CM/<: CPT | Performed by: SURGERY

## 2021-04-05 NOTE — PLAN OF CARE
Problem: Wound:  Goal: Will show signs of wound healing; wound closure and no evidence of infection  Description: Will show signs of wound healing; wound closure and no evidence of infection  4/5/2021 0949 by Shabana Rhodes RN  Outcome: Ongoing  4/5/2021 0944 by Savanah Almanza RN  Outcome: Ongoing

## 2021-04-05 NOTE — PROGRESS NOTES
[address]   Progress Note and Procedure Note      Violet Marshall  MEDICAL RECORD NUMBER:  [id number]  AGE: 49 y.o. GENDER: male  : 1965  EPISODE DATE:  2021    Subjective:     Chief Complaint   Patient presents with    Wound Check     wound         HISTORY of PRESENT ILLNESS HPI     Violet Marshall is a 49 y.o. male who presents today for wound/ulcer evaluation. Wound Context: Pt with R 1 metatarsal foot wound here for eval/treat  Wound/Ulcer Pain Timing/Severity: none  Quality of pain: N/A  Severity:  0 / 10   Modifying Factors: None  Associated Signs/Symptoms: none    Ulcer Identification:  Ulcer Type: pressure  Contributing Factors: chronic pressure    Wound: pressure        PAST MEDICAL HISTORY        Diagnosis Date    Anxiety     Diabetes ([facility]     Schizophrenic disorder ([facility]        PAST SURGICAL HISTORY    Past Surgical History:   Procedure Laterality Date    TOE AMPUTATION Left 2020    4TH AND 5TH OPEN TOE AMPUTATION performed by Kiana Dubois MD at [facility] [address]    History reviewed. No pertinent family history.     SOCIAL HISTORY    Social History     Tobacco Use    Smoking status: Former Smoker     Packs/day: 0.00     Types: Cigars    Smokeless tobacco: Never Used    Tobacco comment: Had to quit can not smoke, can not go outside   Substance Use Topics    Alcohol use: Not Currently    Drug use: Never       ALLERGIES    Allergies   Allergen Reactions    Clozaril [Clozapine]     Haldol [Haloperidol]     Lithium     Navane [Thiothixene]        MEDICATIONS    Current Outpatient Medications on File Prior to Encounter   Medication Sig Dispense Refill    CHOLECALCIFEROL PO Take 1,000 Units by mouth daily      famotidine (PEPCID) 20 MG tablet Take 20 mg by mouth daily      risperiDONE (RISPERDAL) 2 MG tablet Take 2 mg by mouth daily      FLUoxetine (PROZAC) 40 MG capsule Take 40 mg by mouth nightly      OLANZapine zydis (ZYPREXA) 20 MG rhonchi, normal air movement, no respiratory distress  Cardiovascular: normal rate, regular rhythm, normal S1 and S2, no murmurs, rubs, clicks, or gallops, distal pulses intact, no carotid bruits  Abdomen: soft, non-tender, non-distended, normal bowel sounds, no masses or organomegaly  Extremities: no cyanosis, clubbing or edema  Musculoskeletal: normal range of motion, no joint swelling, deformity or tenderness  Neurologic: reflexes normal and symmetric, no cranial nerve deficit, gait, coordination and speech normal, sensation of skin normal      Assessment:      Problem List Items Addressed This Visit     Right foot ulcer, with fat layer exposed ([address] (Chronic)    * (Principal) Diabetic ulcer of right midfoot associated with type 2 diabetes mellitus, with fat layer exposed (HCC) (Chronic)    PVD (peripheral vascular disease) (HCC)    Cigarette nicotine dependence with nicotine-induced disorder    Ulcer of toe of left foot, with fat layer exposed ([address]           Procedure Note  Indications:  Based on my examination of this patient's wound(s)/ulcer(s) today, debridement is required to promote healing and evaluate the wound base. Performed by: Melvin Moscoso MD    Consent obtained:  Yes    Time out taken:  Yes    Pain Control:         Debridement:Non-excisional Debridement    Using curette the wound(s)/ulcer(s) was/were sharply debrided down through and including the removal of epidermis and dermis.         Devitalized Tissue Debrided:  fibrin, biofilm, slough, necrotic/eschar, exudate and callus      Pre Debridement Measurements:  Are located in the Wound/Ulcer Documentation Flow Sheet    Wound/Ulcer #: 2    Percent of Wound(s)/Ulcer(s) Debrided: 100%    Total Surface Area Debrided:  0.28 sq cm       Diabetic/Pressure/Non Pressure Ulcers only:  Ulcer: Diabetic ulcer, fat layer exposed           Post Debridement Measurements:    Wound/Ulcer Descriptions are Pre Debridement --EXCEPT MEASUREMENTS    Wound 07/17/20 Foot Plantar;Right Wound 2, Diabetic Lan 1, R. 1st Metatarsal Pad (Active)   Wound Image   04/05/21 0926   Wound Etiology Diabetic Lan 1 04/05/21 0926   Dressing Status Old drainage noted 04/05/21 0926   Wound Cleansed Soap and water 04/05/21 0926   Dressing/Treatment Xeroform 03/08/21 1043   Offloading for Diabetic Foot Ulcers Felt or foam 03/01/21 0922   Wound Length (cm) 0.7 cm 04/05/21 0926   Wound Width (cm) 0.4 cm 04/05/21 0926   Wound Depth (cm) 0.1 cm 04/05/21 0926   Wound Surface Area (cm^2) 0.28 cm^2 04/05/21 0926   Change in Wound Size % (l*w) 22.22 04/05/21 0926   Wound Volume (cm^3) 0.03 cm^3 04/05/21 0926   Wound Healing % 25 04/05/21 0926   Post-Procedure Length (cm) 0.7 cm 04/05/21 0947   Post-Procedure Width (cm) 0.4 cm 04/05/21 0947   Post-Procedure Depth (cm) 0.1 cm 04/05/21 0947   Post-Procedure Surface Area (cm^2) 0.28 cm^2 04/05/21 0947   Post-Procedure Volume (cm^3) 0.03 cm^3 04/05/21 0947   Distance Tunneling (cm) 0 cm 04/05/21 0926   Tunneling Position ___ O'Clock 0 04/05/21 0926   Undermining Starts ___ O'Clock 0 04/05/21 0926   Undermining Ends___ O'Clock 0 04/05/21 0926   Undermining Maxium Distance (cm) 0 04/05/21 0926   Wound Assessment Pink/red;Slough 04/05/21 0926   Drainage Amount Small 04/05/21 0926   Drainage Description Serosanguinous 04/05/21 0926   Odor None 04/05/21 0926   Corine-wound Assessment Blanchable erythema; Hyperkeratosis (callous) 04/05/21 0926   Margins Defined edges 04/05/21 0926   Wound Thickness Description not for Pressure Injury Full thickness 04/05/21 0926   Number of days: 261             Estimated Blood Loss:  Minimal    Hemostasis Achieved:  by pressure    Procedural Pain:  0  / 10     Post Procedural Pain:  0 / 10     Response to treatment:  Well tolerated by patient.          Plan:     Problem List Items Addressed This Visit     Right foot ulcer, with fat layer exposed ([address] (Chronic)    * (Principal) Diabetic ulcer of right midfoot associated with type 2 diabetes mellitus, with fat layer exposed ([address] (Chronic)    PVD (peripheral vascular disease) (HCC)    Cigarette nicotine dependence with nicotine-induced disorder    Ulcer of toe of left foot, with fat layer exposed ([address]          Cont current care  RTO 1 week    Treatment Note please see attached Discharge Instructions    In my professional opinion this patient would benefit from HBO Therapy: No    Written patient dismissal instructions given to patient and signed by patient or POA. Discharge [address] and Hyperbaric Oxygen Therapy   Physician Orders and Discharge Instructions  [address] [facility], [place] [address]  Telephone: [phone]    NAME:  Armand Bosch OF BIRTH:  1965  MEDICAL RECORD NUMBER:  [id number]  DATE:  4/5/2021    Discharge condition: Stable    Discharge to: Home    Left via:Private automobile    Accompanied by: Self     ECF/HHA: [facility] . .. [phone] p  [phone] f     Dressing orders: Right Foot:  Wash right foot wounds with soap and water, rinse well, pat dry. Apply Xeroform to open wounds, dry gauze, secure with roll gauze. Change daily. Felt and Foam with cutout for the wound  Main mode of transportation should be wheelchair.     Treatment Orders  Protein rich diet (unless restricted by your physician)  Multivitamin daily  Elevate legs when sitting    [facility] follow up visit _____________________________  (Please note your next appointment above and if you are unable to keep, kindly give a 24 hour notice. Thank you.)    If you experience any of the following, please call the [facility] [address] [address] during business hours:    * Increase in Pain  * Temperature over 101  * Increase in drainage from your wound  * Drainage with a foul odor  * Bleeding  * Increase in swelling  * Need for compression bandage changes due to slippage, breakthrough drainage.     If you need medical attention outside of the business hours of the [address] [place] [address] Road please contact your PCP or go to the nearest emergency room.         Electronically signed by Aurelia Cardoso MD on 4/5/2021 at 9:48 AM

## 2021-04-14 ENCOUNTER — HOSPITAL ENCOUNTER (OUTPATIENT)
Dept: WOUND CARE | Age: 56
Discharge: HOME | End: 2021-04-14
Payer: MEDICARE

## 2021-04-14 VITALS
DIASTOLIC BLOOD PRESSURE: 82 MMHG | RESPIRATION RATE: 20 BRPM | SYSTOLIC BLOOD PRESSURE: 127 MMHG | HEART RATE: 82 BPM | TEMPERATURE: 98 F

## 2021-04-14 DIAGNOSIS — F17.219 CIGARETTE NICOTINE DEPENDENCE WITH NICOTINE-INDUCED DISORDER: ICD-10-CM

## 2021-04-14 DIAGNOSIS — L97.512 RIGHT FOOT ULCER, WITH FAT LAYER EXPOSED (HCC): Chronic | ICD-10-CM

## 2021-04-14 DIAGNOSIS — L97.412 DIABETIC ULCER OF RIGHT MIDFOOT ASSOCIATED WITH TYPE 2 DIABETES MELLITUS, WITH FAT LAYER EXPOSED (HCC): Primary | Chronic | ICD-10-CM

## 2021-04-14 DIAGNOSIS — L97.522 ULCER OF TOE OF LEFT FOOT, WITH FAT LAYER EXPOSED (HCC): ICD-10-CM

## 2021-04-14 DIAGNOSIS — E11.621 DIABETIC ULCER OF RIGHT MIDFOOT ASSOCIATED WITH TYPE 2 DIABETES MELLITUS, WITH FAT LAYER EXPOSED (HCC): Primary | Chronic | ICD-10-CM

## 2021-04-14 DIAGNOSIS — I73.9 PVD (PERIPHERAL VASCULAR DISEASE) (HCC): ICD-10-CM

## 2021-04-14 PROCEDURE — 97597 DBRDMT OPN WND 1ST 20 CM/<: CPT

## 2021-04-14 PROCEDURE — 97597 DBRDMT OPN WND 1ST 20 CM/<: CPT | Performed by: SURGERY

## 2021-04-14 RX ORDER — GLIPIZIDE 5 MG/1
5 TABLET, FILM COATED, EXTENDED RELEASE ORAL DAILY
COMMUNITY

## 2021-04-14 NOTE — PROGRESS NOTES
(TYLENOL) 325 MG tablet Take 650 mg by mouth every 4 hours as needed for Pain      famotidine (PEPCID) 20 MG tablet Take 20 mg by mouth daily      FLUoxetine (PROZAC) 40 MG capsule Take 40 mg by mouth nightly      insulin glargine (BASAGLAR KWIKPEN) 100 UNIT/ML injection pen Inject 100 Units into the skin nightly       simvastatin (ZOCOR) 20 MG tablet Take 20 mg by mouth nightly      polyethylene glycol (MIRALAX) 17 g PACK packet Take 17 g by mouth daily as needed      risperiDONE (RISPERDAL) 2 MG tablet Take 2 mg by mouth daily      ibuprofen (ADVIL;MOTRIN) 400 MG tablet Take 400 mg by mouth every 6 hours as needed for Pain or Fever      loperamide (IMODIUM) 2 MG capsule Take 2 mg by mouth 4 times daily as needed for Diarrhea      OLANZapine zydis (ZYPREXA) 20 MG disintegrating tablet Take 20 mg by mouth nightly      hydrOXYzine (ATARAX) 25 MG tablet Take 25 mg by mouth 3 times daily as needed for Itching      risperiDONE (RISPERDAL) 4 MG tablet Take 4 mg by mouth nightly       No current facility-administered medications on file prior to encounter. REVIEW OF SYSTEMS    A comprehensive review of systems was negative.     Objective:      /82   Pulse 82   Temp 98 °F (36.7 °C) (Temporal)   Resp 20     Wt Readings from Last 3 Encounters:   03/22/21 229 lb (103.9 kg)   03/15/21 229 lb (103.9 kg)   03/08/21 229 lb (103.9 kg)       PHYSICAL EXAM    General Appearance: alert and oriented to person, place and time, well developed and well- nourished, in no acute distress  Skin: warm and dry, no rash or erythema  Head: normocephalic and atraumatic  Eyes: pupils equal, round, and reactive to light, extraocular eye movements intact, conjunctivae normal  ENT: tympanic membrane, external ear and ear canal normal bilaterally, nose without deformity, nasal mucosa and turbinates normal without polyps, lips teeth and gums normal  Neck: supple and non-tender without mass, no thyromegaly or thyroid nodules, no cervical lymphadenopathy  Pulmonary/Chest: clear to auscultation bilaterally- no wheezes, rales or rhonchi, normal air movement, no respiratory distress  Cardiovascular: normal rate, regular rhythm, normal S1 and S2, no murmurs, rubs, clicks, or gallops, distal pulses intact, no carotid bruits  Abdomen: soft, non-tender, non-distended, normal bowel sounds, no masses or organomegaly  Extremities: no cyanosis, clubbing or edema  Musculoskeletal: normal range of motion, no joint swelling, deformity or tenderness  Neurologic: reflexes normal and symmetric, no cranial nerve deficit, gait, coordination and speech normal, sensation of skin normal      Assessment:      Problem List Items Addressed This Visit     Right foot ulcer, with fat layer exposed ([address] (Chronic)    * (Principal) Diabetic ulcer of right midfoot associated with type 2 diabetes mellitus, with fat layer exposed ([address] - Primary (Chronic)    Relevant Medications    glipiZIDE (GLUCOTROL XL) 5 MG extended release tablet    PVD (peripheral vascular disease) (HCC)    Cigarette nicotine dependence with nicotine-induced disorder    Ulcer of toe of left foot, with fat layer exposed ([address]           Procedure Note  Indications:  Based on my examination of this patient's wound(s)/ulcer(s) today, debridement is required to promote healing and evaluate the wound base. Performed by: Jez Gracia MD    Consent obtained:  Yes    Time out taken:  Yes    Pain Control: Anesthetic  Anesthetic: None       Debridement:Non-excisional Debridement    Using curette the wound(s)/ulcer(s) was/were sharply debrided down through and including the removal of epidermis and dermis.         Devitalized Tissue Debrided:  fibrin, biofilm, slough, necrotic/eschar, exudate and callus      Pre Debridement Measurements:  Are located in the Wound/Ulcer Documentation Flow Sheet    Wound/Ulcer #: 2    Percent of Wound(s)/Ulcer(s) Debrided: 100%    Total Surface Area Debrided:  0.18 sq cm Diabetic/Pressure/Non Pressure Ulcers only:  Ulcer: Diabetic ulcer, fat layer exposed           Post Debridement Measurements:    Wound/Ulcer Descriptions are Pre Debridement --EXCEPT MEASUREMENTS    Wound 07/17/20 Foot Plantar;Right Wound 2, Diabetic Lan 1, R. 1st Metatarsal Pad (Active)   Wound Image   04/14/21 1524   Wound Etiology Diabetic Lan 1 04/14/21 1524   Dressing Status Old drainage noted 04/14/21 1524   Wound Cleansed Not Cleansed 04/14/21 1524   Dressing/Treatment Xeroform 03/08/21 1043   Offloading for Diabetic Foot Ulcers Felt or foam 03/01/21 0922   Wound Length (cm) 0.6 cm 04/14/21 1524   Wound Width (cm) 0.3 cm 04/14/21 1524   Wound Depth (cm) 0.3 cm 04/14/21 1524   Wound Surface Area (cm^2) 0.18 cm^2 04/14/21 1524   Change in Wound Size % (l*w) 50 04/14/21 1524   Wound Volume (cm^3) 0.05 cm^3 04/14/21 1524   Wound Healing % -25 04/14/21 1524   Post-Procedure Length (cm) 0.6 cm 04/14/21 1540   Post-Procedure Width (cm) 0.3 cm 04/14/21 1540   Post-Procedure Depth (cm) 0.2 cm 04/14/21 1540   Post-Procedure Surface Area (cm^2) 0.18 cm^2 04/14/21 1540   Post-Procedure Volume (cm^3) 0.04 cm^3 04/14/21 1540   Distance Tunneling (cm) 0 cm 04/05/21 0926   Tunneling Position ___ O'Clock 0 04/05/21 0926   Undermining Starts ___ O'Clock 0 04/05/21 0926   Undermining Ends___ O'Clock 0 04/05/21 0926   Undermining Maxium Distance (cm) 0 04/05/21 0926   Wound Assessment Slough 04/14/21 1524   Drainage Amount Moderate 04/14/21 1524   Drainage Description Serosanguinous 04/14/21 1524   Odor None 04/14/21 1524   Corine-wound Assessment Blanchable erythema 04/14/21 1524   Margins Defined edges 04/14/21 1524   Wound Thickness Description not for Pressure Injury Full thickness 04/14/21 1524   Number of days: 270             Estimated Blood Loss:  Minimal    Hemostasis Achieved:  by pressure    Procedural Pain:  0  / 10     Post Procedural Pain:  0 / 10     Response to treatment:  Well tolerated by patient. Plan:     Problem List Items Addressed This Visit     Right foot ulcer, with fat layer exposed ([address] (Chronic)    * (Principal) Diabetic ulcer of right midfoot associated with type 2 diabetes mellitus, with fat layer exposed ([address] - Primary (Chronic)    Relevant Medications    glipiZIDE (GLUCOTROL XL) 5 MG extended release tablet    PVD (peripheral vascular disease) (HCC)    Cigarette nicotine dependence with nicotine-induced disorder    Ulcer of toe of left foot, with fat layer exposed ([address]          Cont current care    Treatment Note please see attached Discharge Instructions    In my professional opinion this patient would benefit from HBO Therapy: No    Written patient dismissal instructions given to patient and signed by patient or POA. Discharge [address] and Hyperbaric Oxygen Therapy   Physician Orders and Discharge Instructions  [address] [facility]  [place], [address]  Telephone: [phone]    NAME:  Enoch Sky OF BIRTH:  1965  MEDICAL RECORD NUMBER:  [id number]  DATE:  4/14/2021    Discharge condition: Stable    Discharge to: Home    Left via:Private automobile    Accompanied by: Self     ECF/HHA: [address] [place] [address] and Rehab . .. [phone] p  [phone] f     Dressing orders: Right Foot:  Wash right foot wounds with soap and water, rinse well, pat dry. Apply Xeroform to open wounds, dry gauze, secure with roll gauze. Change daily. Felt and Foam with cutout for the wound  Main mode of transportation should be wheelchair.     Treatment Orders  Protein rich diet (unless restricted by your physician)  Multivitamin daily  Elevate legs when sitting     [facility] follow up visit _____________________________  (Please note your next appointment above and if you are unable to keep, kindly give a 24 hour notice.  Thank you.)    If you experience any of the following, please call the [address] during business hours:    * Increase in Pain  * Temperature over 101  * Increase in drainage from your wound  * Drainage with a foul odor  * Bleeding  * Increase in swelling  * Need for compression bandage changes due to slippage, breakthrough drainage. If you need medical attention outside of the business hours of the [facility] please contact your PCP or go to the nearest emergency room.         Electronically signed by Kenny Pierre MD on 4/14/2021 at 3:40 PM

## 2021-05-12 ENCOUNTER — HOSPITAL ENCOUNTER (OUTPATIENT)
Dept: WOUND CARE | Age: 56
Discharge: HOME | End: 2021-05-12
Payer: MEDICARE

## 2021-05-12 VITALS
SYSTOLIC BLOOD PRESSURE: 132 MMHG | TEMPERATURE: 96.4 F | RESPIRATION RATE: 18 BRPM | BODY MASS INDEX: 28.47 KG/M2 | HEART RATE: 94 BPM | HEIGHT: 75 IN | WEIGHT: 229 LBS | DIASTOLIC BLOOD PRESSURE: 74 MMHG

## 2021-05-12 DIAGNOSIS — L97.412 DIABETIC ULCER OF RIGHT MIDFOOT ASSOCIATED WITH TYPE 2 DIABETES MELLITUS, WITH FAT LAYER EXPOSED (HCC): Primary | ICD-10-CM

## 2021-05-12 DIAGNOSIS — E11.621 DIABETIC ULCER OF RIGHT MIDFOOT ASSOCIATED WITH TYPE 2 DIABETES MELLITUS, WITH FAT LAYER EXPOSED (HCC): Primary | ICD-10-CM

## 2021-05-12 DIAGNOSIS — F17.219 CIGARETTE NICOTINE DEPENDENCE WITH NICOTINE-INDUCED DISORDER: ICD-10-CM

## 2021-05-12 DIAGNOSIS — I73.9 PVD (PERIPHERAL VASCULAR DISEASE) (HCC): ICD-10-CM

## 2021-05-12 DIAGNOSIS — L97.512 RIGHT FOOT ULCER, WITH FAT LAYER EXPOSED (HCC): ICD-10-CM

## 2021-05-12 DIAGNOSIS — L97.522 ULCER OF TOE OF LEFT FOOT, WITH FAT LAYER EXPOSED (HCC): ICD-10-CM

## 2021-05-12 PROBLEM — M86.172 OSTEOMYELITIS OF ANKLE OR FOOT, ACUTE, LEFT (HCC): Status: RESOLVED | Noted: 2020-07-17 | Resolved: 2021-05-12

## 2021-05-12 PROBLEM — D72.829 LEUKOCYTOSIS: Status: RESOLVED | Noted: 2020-07-17 | Resolved: 2021-05-12

## 2021-05-12 PROCEDURE — 97597 DBRDMT OPN WND 1ST 20 CM/<: CPT | Performed by: NURSE PRACTITIONER

## 2021-05-12 PROCEDURE — 97597 DBRDMT OPN WND 1ST 20 CM/<: CPT

## 2021-05-12 NOTE — PROGRESS NOTES
[address]   Progress Note and Procedure Note      Ta Webber  MEDICAL RECORD NUMBER:  [id number]  AGE: 49 y.o. GENDER: male  : 1965  EPISODE DATE:  2021    Subjective:     Chief Complaint   Patient presents with    Wound Check     Right foot wound; patient denies increased pain at wound site         HISTORY of PRESENT ILLNESS HPI     Ta Webber is a 49 y.o. male who presents today for wound/ulcer evaluation. History of Wound Context:right foot wound    Ulcer Identification:  Ulcer Type: diabetic  Contributing Factors: diabetes, chronic pressure and shear force    Wound: N/A        PAST MEDICAL HISTORY        Diagnosis Date    Anxiety     Diabetes [facility]     Osteomyelitis of ankle or foot, acute, left ([facility] 2020    [facility]        PAST SURGICAL HISTORY    Past Surgical History:   Procedure Laterality Date    TOE AMPUTATION Left 2020    4TH AND 5TH OPEN TOE AMPUTATION performed by Ashleigh Mcconnell MD at [address]    History reviewed. No pertinent family history.     SOCIAL HISTORY    Social History     Tobacco Use    Smoking status: Former Smoker     Packs/day: 0.00     Types: Cigars    Smokeless tobacco: Never Used    Tobacco comment: Had to quit can not smoke, can not go outside   Substance Use Topics    Alcohol use: Not Currently    Drug use: Never       ALLERGIES    Allergies   Allergen Reactions    Clozaril [Clozapine]     Haldol [Haloperidol]     Lithium     Navane [Thiothixene]        MEDICATIONS    Current Outpatient Medications on File Prior to Encounter   Medication Sig Dispense Refill    glipiZIDE (GLUCOTROL XL) 5 MG extended release tablet Take 5 mg by mouth daily      CHOLECALCIFEROL PO Take 1,000 Units by mouth daily      polyethylene glycol (MIRALAX) 17 g PACK packet Take 17 g by mouth daily as needed      acetaminophen (TYLENOL) 650 MG suppository Place 650 mg rectally every 6 hours lymphadenopathy  Pulmonary/Chest: clear to auscultation bilaterally- no wheezes, rales or rhonchi, normal air movement, no respiratory distress  Extremities: no cyanosis, clubbing or edema  Musculoskeletal: normal range of motion, no joint swelling, deformity or tenderness  Neurologic: reflexes normal and symmetric, no cranial nerve deficit, gait, coordination and speech normal      Assessment:      Patient Active Problem List   Diagnosis Code    Major depression, recurrent, chronic (HCC) F33.9    Bipolar disorder ([address] F31.9    Psychiatric disorder F99    Right foot ulcer, with fat layer exposed ([address] L97.512    PVD (peripheral vascular disease) ([address] I73.9    Tobacco abuse counseling Z71.6    Cigarette nicotine dependence with nicotine-induced disorder F17.219    Schizophrenia ([address] F20.9    Ulcer of toe of left foot, with fat layer exposed ([address] L97.522    Diabetic ulcer of right midfoot associated with type 2 diabetes mellitus, with fat layer exposed ([address] E11.621, L97.412        Procedure Note  Indications:  Based on my examination of this patient's wound(s)/ulcer(s) today, debridement is required to promote healing and evaluate the wound base. Performed by: CAMDEN Walters CNP    Consent obtained:  Yes    Time out taken:  Yes    Pain Control: Anesthetic  Anesthetic: 2% Lidocaine Gel Topical       Debridement:Non-excisional Debridement    Using #15 blade scalpel and forceps the wound(s)/ulcer(s) was/were sharply debrided down through and including the removal of epidermis and dermis.         Devitalized Tissue Debrided:  fibrin, biofilm, slough, exudate and callus    Pre Debridement Measurements:  Are located in the Wound/Ulcer Documentation Flow Sheet    Wound/Ulcer #: 2    Post Debridement Measurements:  Wound/Ulcer Descriptions are Pre Debridement except measurements:      Percent of Wound/Ulcer Debrided: 100%    Total Surface Area Debrided:  0.8 sq cm     Wound 07/17/20 Foot Plantar;Right Wound 2, Diabetic Lan 1, R. 1st Metatarsal Pad (Active)   Wound Image    05/12/21 0922   Wound Etiology Diabetic Lan 1 05/12/21 0922   Dressing Status Old drainage noted 05/12/21 0922   Wound Cleansed Cleansed with saline 05/12/21 0922   Dressing/Treatment Xeroform 05/12/21 1025   Offloading for Diabetic Foot Ulcers Felt or foam 05/12/21 1025   Wound Length (cm) 1 cm 05/12/21 0922   Wound Width (cm) 0.8 cm 05/12/21 0922   Wound Depth (cm) 0.1 cm 05/12/21 0922   Wound Surface Area (cm^2) 0.8 cm^2 05/12/21 0922   Change in Wound Size % (l*w) -122.22 05/12/21 0922   Wound Volume (cm^3) 0.08 cm^3 05/12/21 0922   Wound Healing % -100 05/12/21 0922   Post-Procedure Length (cm) 0.5 cm 05/12/21 0943   Post-Procedure Width (cm) 0.3 cm 05/12/21 0943   Post-Procedure Depth (cm) 0.1 cm 05/12/21 0943   Post-Procedure Surface Area (cm^2) 0.15 cm^2 05/12/21 0943   Post-Procedure Volume (cm^3) 0.02 cm^3 05/12/21 0943   Distance Tunneling (cm) 0 cm 04/05/21 0926   Tunneling Position ___ O'Clock 0 04/05/21 0926   Undermining Starts ___ O'Clock 0 04/05/21 0926   Undermining Ends___ O'Clock 0 04/05/21 0926   Undermining Maxium Distance (cm) 0 04/05/21 0926   Wound Assessment Other (Comment) 05/12/21 0922   Drainage Amount Moderate 05/12/21 0922   Drainage Description Serosanguinous 05/12/21 0922   Odor None 05/12/21 0922   Corine-wound Assessment Blanchable erythema 05/12/21 0922   Margins Defined edges 05/12/21 4918   Wound Thickness Description not for Pressure Injury Full thickness 04/14/21 1524   Number of days: 298            Diabetic/Pressure/Non Pressure Ulcers only:  Ulcer: Diabetic ulcer, fat layer exposed      Estimated Blood Loss:  Minimal    Hemostasis Achieved:  by pressure    Procedural Pain:  0  / 10     Post Procedural Pain:  0 / 10     Response to treatment:  Well tolerated by patient.        Plan:     Problem List Items Addressed This Visit     Right foot ulcer, with fat layer exposed ([address] (Chronic) Relevant Medications    Lidocaine 2 % GEL    Other Relevant Orders    Supply: Wound Cleanser    Supply: Corine Wound    Supply: Wound Dressings    Supply: Pack Wound    Supply: Cover and Secure    * (Principal) Diabetic ulcer of right midfoot associated with type 2 diabetes mellitus, with fat layer exposed ([address] - Primary (Chronic)    Relevant Medications    Lidocaine 2 % GEL    Other Relevant Orders    Supply: Wound Cleanser    Supply: Corine Wound    Supply: Wound Dressings    Supply: Pack Wound    Supply: Cover and Secure    PVD (peripheral vascular disease) (HCC)    Relevant Medications    Lidocaine 2 % GEL    Other Relevant Orders    Supply: Wound Cleanser    Supply: Corine Wound    Supply: Wound Dressings    Supply: Pack Wound    Supply: Cover and Secure    Cigarette nicotine dependence with nicotine-induced disorder    Relevant Medications    Lidocaine 2 % GEL    Other Relevant Orders    Supply: Wound Cleanser    Supply: Corine Wound    Supply: Wound Dressings    Supply: Pack Wound    Supply: Cover and Secure    Ulcer of toe of left foot, with fat layer exposed (HCC)    Relevant Medications    Lidocaine 2 % GEL    Other Relevant Orders    Supply: Wound Cleanser    Supply: Corine Wound    Supply: Wound Dressings    Supply: Pack Wound    Supply: Cover and Secure          Treatment Note please see attached Discharge Instructions    In my professional opinion this patient would benefit from HBO Therapy: No    Written patient dismissal instructions given to patient and signed by patient or POA.            Electronically signed by CAMDEN Shaw CNP on 5/12/2021 at 1:19 PM

## 2021-05-18 ENCOUNTER — HOSPITAL ENCOUNTER (OUTPATIENT)
Dept: WOUND CARE | Age: 56
Discharge: HOME | End: 2021-05-18
Payer: MEDICARE

## 2021-05-18 ENCOUNTER — HOSPITAL ENCOUNTER (OUTPATIENT)
Dept: GENERAL RADIOLOGY | Age: 56
Discharge: HOME | End: 2021-05-18
Payer: MEDICARE

## 2021-05-18 VITALS
SYSTOLIC BLOOD PRESSURE: 127 MMHG | RESPIRATION RATE: 18 BRPM | HEIGHT: 75 IN | HEART RATE: 77 BPM | BODY MASS INDEX: 28.47 KG/M2 | DIASTOLIC BLOOD PRESSURE: 86 MMHG | TEMPERATURE: 95.2 F | WEIGHT: 229 LBS

## 2021-05-18 DIAGNOSIS — L97.412 DIABETIC ULCER OF RIGHT MIDFOOT ASSOCIATED WITH TYPE 2 DIABETES MELLITUS, WITH FAT LAYER EXPOSED (HCC): ICD-10-CM

## 2021-05-18 DIAGNOSIS — E11.621 DIABETIC ULCER OF RIGHT MIDFOOT ASSOCIATED WITH TYPE 2 DIABETES MELLITUS, WITH FAT LAYER EXPOSED (HCC): ICD-10-CM

## 2021-05-18 DIAGNOSIS — E11.621 DIABETIC ULCER OF RIGHT MIDFOOT ASSOCIATED WITH TYPE 2 DIABETES MELLITUS, WITH FAT LAYER EXPOSED (HCC): Primary | ICD-10-CM

## 2021-05-18 DIAGNOSIS — I73.9 PVD (PERIPHERAL VASCULAR DISEASE) (HCC): ICD-10-CM

## 2021-05-18 DIAGNOSIS — L97.412 DIABETIC ULCER OF RIGHT MIDFOOT ASSOCIATED WITH TYPE 2 DIABETES MELLITUS, WITH FAT LAYER EXPOSED (HCC): Primary | ICD-10-CM

## 2021-05-18 DIAGNOSIS — L97.522 ULCER OF TOE OF LEFT FOOT, WITH FAT LAYER EXPOSED (HCC): ICD-10-CM

## 2021-05-18 DIAGNOSIS — F17.219 CIGARETTE NICOTINE DEPENDENCE WITH NICOTINE-INDUCED DISORDER: ICD-10-CM

## 2021-05-18 DIAGNOSIS — L97.512 RIGHT FOOT ULCER, WITH FAT LAYER EXPOSED (HCC): ICD-10-CM

## 2021-05-18 PROCEDURE — 97597 DBRDMT OPN WND 1ST 20 CM/<: CPT | Performed by: SURGERY

## 2021-05-18 PROCEDURE — 97597 DBRDMT OPN WND 1ST 20 CM/<: CPT

## 2021-05-18 PROCEDURE — 73620 X-RAY EXAM OF FOOT: CPT

## 2021-05-18 RX ORDER — M-VIT,TX,IRON,MINS/CALC/FOLIC 27MG-0.4MG
1 TABLET ORAL DAILY
COMMUNITY

## 2021-05-18 RX ORDER — ONDANSETRON 4 MG/1
4 TABLET, FILM COATED ORAL
COMMUNITY

## 2021-05-18 RX ORDER — DOCUSATE SODIUM 100 MG/1
100 CAPSULE, LIQUID FILLED ORAL DAILY
COMMUNITY

## 2021-05-18 NOTE — PLAN OF CARE
Problem: Wound:  Goal: Will show signs of wound healing; wound closure and no evidence of infection  Description: Will show signs of wound healing; wound closure and no evidence of infection  5/18/2021 0909 by Savanah Almanza RN  Outcome: Ongoing  5/18/2021 0903 by Shabana Rhodes RN  Outcome: Ongoing     Problem: Falls - Risk of:  Goal: Will remain free from falls  Description: Will remain free from falls  5/18/2021 0909 by Savanah Almanza RN  Outcome: Ongoing  5/18/2021 0903 by Shabana Rhodes RN  Outcome: Ongoing     Problem: Blood Glucose:  Goal: Ability to maintain appropriate glucose levels will improve  Description: Ability to maintain appropriate glucose levels will improve  5/18/2021 0909 by Savanah Almanza RN  Outcome: Ongoing  5/18/2021 0903 by Shabana Rhodes RN  Outcome: Ongoing     Problem: Wound:  Goal: Will show signs of wound healing; wound closure and no evidence of infection  Description: Will show signs of wound healing; wound closure and no evidence of infection  Outcome: Ongoing

## 2021-05-18 NOTE — PROGRESS NOTES
mouth 2 times daily (with meals) Give 1 tablet by mouth two times a day for diabetes      Multiple Vitamins-Minerals (THERAPEUTIC MULTIVITAMIN-MINERALS) tablet Take 1 tablet by mouth daily Give 1 tablet by mouth one time a day for wound healing      glipiZIDE (GLUCOTROL XL) 5 MG extended release tablet Take 5 mg by mouth daily      CHOLECALCIFEROL PO Take 1,000 Units by mouth daily      famotidine (PEPCID) 20 MG tablet Take 20 mg by mouth daily      risperiDONE (RISPERDAL) 2 MG tablet Take 2 mg by mouth 2 times daily Give 1 tablet by mouth two times a day related to schizophrenia      FLUoxetine (PROZAC) 40 MG capsule Take 40 mg by mouth nightly      OLANZapine zydis (ZYPREXA) 20 MG disintegrating tablet Take 20 mg by mouth nightly      insulin glargine (BASAGLAR KWIKPEN) 100 UNIT/ML injection pen Inject 100 Units into the skin nightly       hydrOXYzine (ATARAX) 25 MG tablet Take 25 mg by mouth 3 times daily as needed for Itching      simvastatin (ZOCOR) 20 MG tablet Take 20 mg by mouth nightly      ondansetron (ZOFRAN) 4 MG tablet Take 4 mg by mouth every 6-8 hours as needed for Nausea or Vomiting Give 1 tablet by mouth every 6 hours as needed for nausea and vomiting      polyethylene glycol (MIRALAX) 17 g PACK packet Take 17 g by mouth daily as needed      acetaminophen (TYLENOL) 650 MG suppository Place 650 mg rectally every 6 hours as needed for Fever or Pain      acetaminophen (TYLENOL) 325 MG tablet Take 650 mg by mouth every 4 hours as needed for Pain      ibuprofen (ADVIL;MOTRIN) 400 MG tablet Take 400 mg by mouth every 6 hours as needed for Pain or Fever      loperamide (IMODIUM) 2 MG capsule Take 2 mg by mouth 4 times daily as needed for Diarrhea      risperiDONE (RISPERDAL) 4 MG tablet Take 4 mg by mouth nightly       No current facility-administered medications on file prior to encounter. REVIEW OF SYSTEMS    A comprehensive review of systems was negative.     Objective:      BP 127/86   Pulse 77   Temp 95.2 °F (35.1 °C) (Temporal)   Resp 18   Ht 6' 3\" (1.905 m)   Wt 229 lb (103.9 kg)   BMI 28.62 kg/m²     Wt Readings from Last 3 Encounters:   05/18/21 229 lb (103.9 kg)   05/12/21 229 lb (103.9 kg)   03/22/21 229 lb (103.9 kg)       PHYSICAL EXAM    General Appearance: alert and oriented to person, place and time, well developed and well- nourished, in no acute distress  Skin: warm and dry, no rash or erythema  Head: normocephalic and atraumatic  Eyes: pupils equal, round, and reactive to light, extraocular eye movements intact, conjunctivae normal  ENT: tympanic membrane, external ear and ear canal normal bilaterally, nose without deformity, nasal mucosa and turbinates normal without polyps, lips teeth and gums normal  Neck: supple and non-tender without mass, no thyromegaly or thyroid nodules, no cervical lymphadenopathy  Pulmonary/Chest: clear to auscultation bilaterally- no wheezes, rales or rhonchi, normal air movement, no respiratory distress  Cardiovascular: normal rate, regular rhythm, normal S1 and S2, no murmurs, rubs, clicks, or gallops, distal pulses intact, no carotid bruits  Abdomen: soft, non-tender, non-distended, normal bowel sounds, no masses or organomegaly  Extremities: no cyanosis, clubbing or edema  Musculoskeletal: normal range of motion, no joint swelling, deformity or tenderness  Neurologic: reflexes normal and symmetric, no cranial nerve deficit, gait, coordination and speech normal, sensation of skin normal      Assessment:      Problem List Items Addressed This Visit     Right foot ulcer, with fat layer exposed (HCC) (Chronic)    Relevant Orders    Supply: Wound Cleanser    * (Principal) Diabetic ulcer of right midfoot associated with type 2 diabetes mellitus, with fat layer exposed ([address] - Primary (Chronic)    Relevant Medications    metFORMIN (GLUCOPHAGE) 1000 MG tablet    Other Relevant Orders    Supply: Wound Cleanser    PVD (peripheral vascular disease) ([facility]    Relevant Orders    Supply: Wound Cleanser    Cigarette nicotine dependence with nicotine-induced disorder    Relevant Orders    Supply: Wound Cleanser    Ulcer of toe of left foot, with fat layer exposed ([facility]    Relevant Orders    Supply: Wound Cleanser           Procedure Note  Indications:  Based on my examination of this patient's wound(s)/ulcer(s) today, debridement is required to promote healing and evaluate the wound base. Performed by: Dayana Bob MD    Consent obtained:  Yes    Time out taken:  Yes    Pain Control: Anesthetic  Anesthetic: 2% Lidocaine Gel Topical       Debridement:Non-excisional Debridement    Using curette the wound(s)/ulcer(s) was/were sharply debrided down through and including the removal of epidermis and dermis.         Devitalized Tissue Debrided:  fibrin, biofilm, slough, necrotic/eschar, exudate and callus      Pre Debridement Measurements:  Are located in the Wound/Ulcer Documentation Flow Sheet    Wound/Ulcer #: 1    Percent of Wound(s)/Ulcer(s) Debrided: 100%    Total Surface Area Debrided:  0.35 sq cm       Diabetic/Pressure/Non Pressure Ulcers only:  Ulcer: Diabetic ulcer, fat layer exposed           Post Debridement Measurements:    Wound/Ulcer Descriptions are Pre Debridement --EXCEPT MEASUREMENTS    Wound 07/17/20 Foot Plantar;Right Wound 2, Diabetic Aln 1, R. 1st Metatarsal Pad (Active)   Wound Image   05/18/21 0851   Wound Etiology Diabetic Lan 1 05/18/21 0851   Dressing Status Old drainage noted 05/18/21 0851   Wound Cleansed Soap and water 05/18/21 0851   Dressing/Treatment Xeroform 05/12/21 1025   Offloading for Diabetic Foot Ulcers Felt or foam 05/18/21 0851   Wound Length (cm) 0.7 cm 05/18/21 0851   Wound Width (cm) 0.5 cm 05/18/21 0851   Wound Depth (cm) 0.1 cm 05/18/21 0851   Wound Surface Area (cm^2) 0.35 cm^2 05/18/21 0851   Change in Wound Size % (l*w) 2.78 05/18/21 0851   Wound Volume (cm^3) 0.04 cm^3 05/18/21 0851   Wound Healing % 0 05/18/21 0851   Post-Procedure Length (cm) 0.7 cm 05/18/21 0913   Post-Procedure Width (cm) 0.5 cm 05/18/21 0913   Post-Procedure Depth (cm) 0.1 cm 05/18/21 0913   Post-Procedure Surface Area (cm^2) 0.35 cm^2 05/18/21 0913   Post-Procedure Volume (cm^3) 0.04 cm^3 05/18/21 0913   Distance Tunneling (cm) 0 cm 05/18/21 0851   Tunneling Position ___ O'Clock 0 05/18/21 0851   Undermining Starts ___ O'Clock 0 05/18/21 0851   Undermining Ends___ O'Clock 0 05/18/21 0851   Undermining Maxium Distance (cm) 0 05/18/21 0851   Wound Assessment Pink/red 05/18/21 0851   Drainage Amount Small 05/18/21 0851   Drainage Description Serous 05/18/21 0851   Odor None 05/18/21 0851   Corine-wound Assessment Blanchable erythema 05/18/21 0851   Margins Defined edges 05/18/21 0851   Wound Thickness Description not for Pressure Injury Full thickness 04/14/21 1524   Number of days: 304             Estimated Blood Loss:  Minimal    Hemostasis Achieved:  by pressure    Procedural Pain:  0  / 10     Post Procedural Pain:  0 / 10     Response to treatment:  Well tolerated by patient.          Plan:     Problem List Items Addressed This Visit     Right foot ulcer, with fat layer exposed ([address] (Chronic)    Relevant Orders    Supply: Wound Cleanser    * (Principal) Diabetic ulcer of right midfoot associated with type 2 diabetes mellitus, with fat layer exposed ([address] - Primary (Chronic)    Relevant Medications    metFORMIN (GLUCOPHAGE) 1000 MG tablet    Other Relevant Orders    Supply: Wound Cleanser    PVD (peripheral vascular disease) ([address]    Relevant Orders    Supply: Wound Cleanser    Cigarette nicotine dependence with nicotine-induced disorder    Relevant Orders    Supply: Wound Cleanser    Ulcer of toe of left foot, with fat layer exposed ([address]    Relevant Orders    Supply: Wound Cleanser          Cont current care wound improving    Treatment Note please see attached Discharge Instructions    In my professional opinion this patient would benefit from HBO Therapy: No    Written patient dismissal instructions given to patient and signed by patient or POA. Discharge [address] and Hyperbaric Oxygen Therapy   Physician Orders and Discharge Instructions  [address] [facility]  Sharon Alejandra  Telephone: [phone]    NAME:  Dm Dinerod:  1965  MEDICAL RECORD NUMBER:  [id number]  DATE:  5/18/2021    Discharge condition: Stable    Discharge to: Home    Left via:Private automobile    Accompanied by: Self    ECF/HHA: [facility] . .. [phone] p  [phone] f    Dressing orders: Right Foot:  Wash right foot wounds with soap and water, rinse well, pat dry. Apply Xeroform to open wounds, dry gauze, secure with roll gauze. Change daily. Felt and Foam with cutout for the wound  Main mode of transportation should be wheelchair. Treatment Orders  Protein rich diet (unless restricted by your physician)  Multivitamin daily  Elevate legs when sitting     [facility] follow up visit ____________1 week_________________  (Please note your next appointment above and if you are unable to keep, kindly give a 24 hour notice. Thank you.)    If you experience any of the following, please call the [address] [place] [address] [employer] during business hours:    * Increase in Pain  * Temperature over 101  * Increase in drainage from your wound  * Drainage with a foul odor  * Bleeding  * Increase in swelling  * Need for compression bandage changes due to slippage, breakthrough drainage. If you need medical attention outside of the business hours of the [employer] [place] [employer] please contact your PCP or go to the nearest emergency room.         Electronically signed by Jez Gracia MD on 5/18/2021 at 9:14 AM

## 2021-05-18 NOTE — PLAN OF CARE
Problem: Wound:  Goal: Will show signs of wound healing; wound closure and no evidence of infection  Description: Will show signs of wound healing; wound closure and no evidence of infection  Outcome: Ongoing     Problem: Falls - Risk of:  Goal: Will remain free from falls  Description: Will remain free from falls  Outcome: Ongoing     Problem: Blood Glucose:  Goal: Ability to maintain appropriate glucose levels will improve  Description: Ability to maintain appropriate glucose levels will improve  Outcome: Ongoing 114

## 2021-05-24 ENCOUNTER — HOSPITAL ENCOUNTER (OUTPATIENT)
Dept: WOUND CARE | Age: 56
Discharge: HOME | End: 2021-05-24
Payer: MEDICARE

## 2021-05-24 VITALS
DIASTOLIC BLOOD PRESSURE: 81 MMHG | SYSTOLIC BLOOD PRESSURE: 131 MMHG | HEIGHT: 75 IN | WEIGHT: 239.19 LBS | TEMPERATURE: 97.1 F | HEART RATE: 96 BPM | BODY MASS INDEX: 29.74 KG/M2

## 2021-05-24 DIAGNOSIS — E11.621 DIABETIC ULCER OF RIGHT MIDFOOT ASSOCIATED WITH TYPE 2 DIABETES MELLITUS, WITH FAT LAYER EXPOSED (HCC): Primary | ICD-10-CM

## 2021-05-24 DIAGNOSIS — L97.512 RIGHT FOOT ULCER, WITH FAT LAYER EXPOSED (HCC): ICD-10-CM

## 2021-05-24 DIAGNOSIS — L97.522 ULCER OF TOE OF LEFT FOOT, WITH FAT LAYER EXPOSED (HCC): ICD-10-CM

## 2021-05-24 DIAGNOSIS — F17.219 CIGARETTE NICOTINE DEPENDENCE WITH NICOTINE-INDUCED DISORDER: ICD-10-CM

## 2021-05-24 DIAGNOSIS — I73.9 PVD (PERIPHERAL VASCULAR DISEASE) (HCC): ICD-10-CM

## 2021-05-24 DIAGNOSIS — L97.412 DIABETIC ULCER OF RIGHT MIDFOOT ASSOCIATED WITH TYPE 2 DIABETES MELLITUS, WITH FAT LAYER EXPOSED (HCC): Primary | ICD-10-CM

## 2021-05-24 PROCEDURE — 11042 DBRDMT SUBQ TIS 1ST 20SQCM/<: CPT

## 2021-05-24 PROCEDURE — 11042 DBRDMT SUBQ TIS 1ST 20SQCM/<: CPT | Performed by: SURGERY

## 2021-05-24 NOTE — PROGRESS NOTES
constipatiom      metFORMIN (GLUCOPHAGE) 1000 MG tablet Take 1,000 mg by mouth 2 times daily (with meals) Give 1 tablet by mouth two times a day for diabetes      Multiple Vitamins-Minerals (THERAPEUTIC MULTIVITAMIN-MINERALS) tablet Take 1 tablet by mouth daily Give 1 tablet by mouth one time a day for wound healing      glipiZIDE (GLUCOTROL XL) 5 MG extended release tablet Take 5 mg by mouth daily      CHOLECALCIFEROL PO Take 1,000 Units by mouth daily      famotidine (PEPCID) 20 MG tablet Take 20 mg by mouth daily      risperiDONE (RISPERDAL) 2 MG tablet Take 2 mg by mouth 2 times daily Give 1 tablet by mouth two times a day related to schizophrenia      FLUoxetine (PROZAC) 40 MG capsule Take 40 mg by mouth nightly      OLANZapine zydis (ZYPREXA) 20 MG disintegrating tablet Take 20 mg by mouth nightly      insulin glargine (BASAGLAR KWIKPEN) 100 UNIT/ML injection pen Inject 100 Units into the skin nightly       risperiDONE (RISPERDAL) 4 MG tablet Take 4 mg by mouth nightly      simvastatin (ZOCOR) 20 MG tablet Take 20 mg by mouth nightly      ondansetron (ZOFRAN) 4 MG tablet Take 4 mg by mouth every 6-8 hours as needed for Nausea or Vomiting Give 1 tablet by mouth every 6 hours as needed for nausea and vomiting      polyethylene glycol (MIRALAX) 17 g PACK packet Take 17 g by mouth daily as needed      acetaminophen (TYLENOL) 650 MG suppository Place 650 mg rectally every 6 hours as needed for Fever or Pain      acetaminophen (TYLENOL) 325 MG tablet Take 650 mg by mouth every 4 hours as needed for Pain      ibuprofen (ADVIL;MOTRIN) 400 MG tablet Take 400 mg by mouth every 6 hours as needed for Pain or Fever      loperamide (IMODIUM) 2 MG capsule Take 2 mg by mouth 4 times daily as needed for Diarrhea      hydrOXYzine (ATARAX) 25 MG tablet Take 25 mg by mouth every 6 hours as needed for Itching        No current facility-administered medications on file prior to encounter.        REVIEW OF Orders    Supply: Wound Cleanser    PVD (peripheral vascular disease) ([facility]    Relevant Orders    Supply: Wound Cleanser    Cigarette nicotine dependence with nicotine-induced disorder    Relevant Orders    Supply: Wound Cleanser    Ulcer of toe of left foot, with fat layer exposed ([facility]    Relevant Orders    Supply: Wound Cleanser           Procedure Note  Indications:  Based on my examination of this patient's wound(s)/ulcer(s) today, debridement is required to promote healing and evaluate the wound base. Performed by: Moni Vazquez MD    Consent obtained:  Yes    Time out taken:  Yes    Pain Control: Anesthetic  Anesthetic: 2% Lidocaine Gel Topical       Debridement:Excisional Debridement    Using curette and forceps the wound(s)/ulcer(s) was/were sharply debrided down through and including the removal of epidermis, dermis and subcutaneous tissue.         Devitalized Tissue Debrided:  fibrin, biofilm, slough, necrotic/eschar, exudate and callus      Pre Debridement Measurements:  Are located in the Wound/Ulcer Documentation Flow Sheet    Wound/Ulcer #: 2    Percent of Wound(s)/Ulcer(s) Debrided: 100%    Total Surface Area Debrided:  0.9 sq cm       Diabetic/Pressure/Non Pressure Ulcers only:  Ulcer: Diabetic ulcer, fat layer exposed             Post Debridement Measurements:    Wound/Ulcer Descriptions are Pre Debridement --EXCEPT MEASUREMENTS    Wound 07/17/20 Foot Plantar;Right Wound 2, Diabetic Lan 1, R. 1st Metatarsal Pad (Active)   Wound Image     05/24/21 0939   Wound Etiology Diabetic Lan 1 05/24/21 0939   Dressing Status Old drainage noted 05/24/21 0939   Wound Cleansed Soap and water 05/24/21 0939   Dressing/Treatment Gauze dressing/dressing sponge;Roll gauze;Tape/Soft cloth adhesive tape;Xeroform 05/18/21 0935   Offloading for Diabetic Foot Ulcers Felt or foam 05/18/21 0935   Wound Length (cm) 1.5 cm 05/24/21 0939   Wound Width (cm) 1 cm 05/24/21 0939   Wound Depth (cm) 0.4 cm 05/24/21 8996 Wound Surface Area (cm^2) 1.5 cm^2 05/24/21 0939   Change in Wound Size % (l*w) -316.67 05/24/21 0939   Wound Volume (cm^3) 0.6 cm^3 05/24/21 0939   Wound Healing % -1400 05/24/21 0939   Post-Procedure Length (cm) 1.5 cm 05/24/21 1018   Post-Procedure Width (cm) 0.6 cm 05/24/21 1018   Post-Procedure Depth (cm) 0.4 cm 05/24/21 1018   Post-Procedure Surface Area (cm^2) 0.9 cm^2 05/24/21 1018   Post-Procedure Volume (cm^3) 0.36 cm^3 05/24/21 1018   Distance Tunneling (cm) 0 cm 05/24/21 0939   Tunneling Position ___ O'Clock 0 05/24/21 0939   Undermining Starts ___ O'Clock 0 05/24/21 0939   Undermining Ends___ O'Clock 0 05/24/21 0939   Undermining Maxium Distance (cm) 0 05/24/21 0939   Wound Assessment Bleeding;Slough 05/24/21 0939   Drainage Amount Small 05/24/21 0939   Drainage Description Serosanguinous 05/24/21 0939   Odor None 05/24/21 0939   Corine-wound Assessment Hyperkeratosis (callous); Blisters 05/24/21 0939   Margins Attached edges 05/24/21 3197   Wound Thickness Description not for Pressure Injury Full thickness 04/14/21 1524   Number of days: 310             Estimated Blood Loss:  Minimal    Hemostasis Achieved:  by pressure    Procedural Pain:  0  / 10     Post Procedural Pain:  0 / 10     Response to treatment:  Well tolerated by patient.          Plan:     Problem List Items Addressed This Visit     Right foot ulcer, with fat layer exposed ([facility] (Chronic)    Relevant Orders    Supply: Wound Cleanser    * (Principal) Diabetic ulcer of right midfoot associated with type 2 diabetes mellitus, with fat layer exposed ([address] - Primary (Chronic)    Relevant Orders    Supply: Wound Cleanser    PVD (peripheral vascular disease) ([facility]    Relevant Orders    Supply: Wound Cleanser    Cigarette nicotine dependence with nicotine-induced disorder    Relevant Orders    Supply: Wound Cleanser    Ulcer of toe of left foot, with fat layer exposed ([facility]    Relevant Orders    Supply: Wound Cleanser          Pt with callus causing deeper wound on foot. I removed the overlying skinand SQ and rec offload shoe. Xray reveals questionable area over 5th metatarsal area but NO wound concerns in that area. No bony problems at area of wound. Labs Pending. Aquacel and offload shoe. Treatment Note please see attached Discharge Instructions    In my professional opinion this patient would benefit from HBO Therapy: No    Written patient dismissal instructions given to patient and signed by patient or POA. Discharge [address] and Hyperbaric Oxygen Therapy   Physician Orders and Discharge Instructions  [address] [facility], [place] [address]  Telephone: [phone]    NAME:  Dorothy Pringle:  1965  MEDICAL RECORD NUMBER:  [id number]  DATE:  5/24/2021    Discharge condition: Stable    Discharge to: Home    Left via:Private automobile    Accompanied by: Self     ECF/HHA: [facility] . .. [phone] p  [phone] f     Dressing orders: Right Foot:  Wash right foot wounds with soap and water, rinse well, pat dry. Apply Aquacel Ag to open wounds, dry gauze, secure with roll gauze. Change daily. Wear metatarsal pad with cutout for the wound and off load shoe      Treatment Orders  Protein rich diet (unless restricted by your physician)  Multivitamin daily  Elevate legs when sitting     [facility] follow up visit ____________1 week_________________  (Please note your next appointment above and if you are unable to keep, kindly give a 24 hour notice. Thank you.)    If you experience any of the following, please call the [address] [place] [address] Road during business hours:    * Increase in Pain  * Temperature over 101  * Increase in drainage from your wound  * Drainage with a foul odor  * Bleeding  * Increase in swelling  * Need for compression bandage changes due to slippage, breakthrough drainage.     If you need medical attention outside of

## 2021-06-07 ENCOUNTER — HOSPITAL ENCOUNTER (OUTPATIENT)
Dept: WOUND CARE | Age: 56
Discharge: HOME | End: 2021-06-07
Payer: MEDICARE

## 2021-06-07 VITALS
DIASTOLIC BLOOD PRESSURE: 80 MMHG | SYSTOLIC BLOOD PRESSURE: 130 MMHG | HEIGHT: 75 IN | WEIGHT: 239 LBS | BODY MASS INDEX: 29.72 KG/M2 | HEART RATE: 80 BPM | TEMPERATURE: 96.8 F | RESPIRATION RATE: 18 BRPM

## 2021-06-07 DIAGNOSIS — L97.522 ULCER OF TOE OF LEFT FOOT, WITH FAT LAYER EXPOSED (HCC): ICD-10-CM

## 2021-06-07 DIAGNOSIS — L97.412 DIABETIC ULCER OF RIGHT MIDFOOT ASSOCIATED WITH TYPE 2 DIABETES MELLITUS, WITH FAT LAYER EXPOSED (HCC): Primary | ICD-10-CM

## 2021-06-07 DIAGNOSIS — E11.621 DIABETIC ULCER OF RIGHT MIDFOOT ASSOCIATED WITH TYPE 2 DIABETES MELLITUS, WITH FAT LAYER EXPOSED (HCC): Primary | ICD-10-CM

## 2021-06-07 DIAGNOSIS — F17.219 CIGARETTE NICOTINE DEPENDENCE WITH NICOTINE-INDUCED DISORDER: ICD-10-CM

## 2021-06-07 DIAGNOSIS — I73.9 PVD (PERIPHERAL VASCULAR DISEASE) (HCC): ICD-10-CM

## 2021-06-07 DIAGNOSIS — L97.512 RIGHT FOOT ULCER, WITH FAT LAYER EXPOSED (HCC): ICD-10-CM

## 2021-06-07 PROCEDURE — 97597 DBRDMT OPN WND 1ST 20 CM/<: CPT

## 2021-06-07 PROCEDURE — 97597 DBRDMT OPN WND 1ST 20 CM/<: CPT | Performed by: SURGERY

## 2021-06-07 ASSESSMENT — PAIN SCALES - GENERAL: PAINLEVEL_OUTOF10: 0

## 2021-06-07 NOTE — PROGRESS NOTES
[address]   Progress Note and Procedure Note      Alia Williamson  MEDICAL RECORD NUMBER:  [id number]  AGE: 49 y.o. GENDER: male  : 1965  EPISODE DATE:  2021    Subjective:     Chief Complaint   Patient presents with    Wound Check     Right foot wound; Patient denies increased pain at wound site         HISTORY of PRESENT ILLNESS HPI     Alia Williamson is a 49 y.o. male who presents today for wound/ulcer evaluation. Wound Context: Pt with R plantar foot wound here for eval/treat  Wound/Ulcer Pain Timing/Severity: none  Quality of pain: N/A  Severity:  0 / 10   Modifying Factors: None  Associated Signs/Symptoms: none    Ulcer Identification:  Ulcer Type: diabetic  Contributing Factors: diabetes, chronic pressure and shear force    Wound: DM        PAST MEDICAL HISTORY        Diagnosis Date    Anxiety     [facility]     Osteomyelitis of ankle or foot, acute, left ([facility] [facility] 2020    Schizophrenic disorder ([facility]        PAST SURGICAL HISTORY    Past Surgical History:   Procedure Laterality Date    TOE AMPUTATION Left 2020    4TH AND 5TH OPEN TOE AMPUTATION performed by Lb Greenwood MD at [address] [address]    History reviewed. No pertinent family history.     SOCIAL HISTORY    Social History     Tobacco Use    Smoking status: Former Smoker     Packs/day: 0.00     Types: Cigars    Smokeless tobacco: Never Used    Tobacco comment: Had to quit can not smoke, can not go outside   [employer] Vaping Use: Never used   Substance Use Topics    Alcohol use: Not Currently    Drug use: Never       ALLERGIES    Allergies   Allergen Reactions    Clozaril [Clozapine]     Haldol [Haloperidol]     Lithium     Navane [Thiothixene]        MEDICATIONS    Current Outpatient Medications on File Prior to Encounter   Medication Sig Dispense Refill    docusate sodium (COLACE) 100 MG capsule Take 100 mg by mouth daily give 1 capsule by mouth one time a day for SYSTEMS    A comprehensive review of systems was negative.     Objective:      /80   Pulse 80   Temp 96.8 °F (36 °C) (Temporal)   Resp 18   Ht 6' 3\" (1.905 m)   Wt 239 lb (108.4 kg)   BMI 29.87 kg/m²     Wt Readings from Last 3 Encounters:   06/07/21 239 lb (108.4 kg)   05/24/21 (S) 239 lb 3 oz (108.5 kg)   05/18/21 229 lb (103.9 kg)       PHYSICAL EXAM    General Appearance: alert and oriented to person, place and time, well developed and well- nourished, in no acute distress  Skin: warm and dry, no rash or erythema  Head: normocephalic and atraumatic  Eyes: pupils equal, round, and reactive to light, extraocular eye movements intact, conjunctivae normal  ENT: tympanic membrane, external ear and ear canal normal bilaterally, nose without deformity, nasal mucosa and turbinates normal without polyps, lips teeth and gums normal  Neck: supple and non-tender without mass, no thyromegaly or thyroid nodules, no cervical lymphadenopathy  Pulmonary/Chest: clear to auscultation bilaterally- no wheezes, rales or rhonchi, normal air movement, no respiratory distress  Cardiovascular: normal rate, regular rhythm, normal S1 and S2, no murmurs, rubs, clicks, or gallops, distal pulses intact, no carotid bruits  Abdomen: soft, non-tender, non-distended, normal bowel sounds, no masses or organomegaly  Extremities: no cyanosis, clubbing or edema  Musculoskeletal: normal range of motion, no joint swelling, deformity or tenderness  Neurologic: reflexes normal and symmetric, no cranial nerve deficit, gait, coordination and speech normal, sensation of skin normal      Assessment:      Problem List Items Addressed This Visit     Right foot ulcer, with fat layer exposed (HCC) (Chronic)    Relevant Medications    Lidocaine 2 % GEL    Other Relevant Orders    Supply: Wound Cleanser    Supply: Corine Wound    Supply: Wound Dressings    Supply: Pack Wound    Supply: Cover and Secure    * (Principal) Diabetic ulcer of right midfoot associated with type 2 diabetes mellitus, with fat layer exposed ([address] - Primary (Chronic)    Relevant Medications    Lidocaine 2 % GEL    Other Relevant Orders    Supply: Wound Cleanser    Supply: Corine Wound    Supply: Wound Dressings    Supply: Pack Wound    Supply: Cover and Secure    PVD (peripheral vascular disease) (HCC)    Relevant Medications    Lidocaine 2 % GEL    Other Relevant Orders    Supply: Wound Cleanser    Supply: Corine Wound    Supply: Wound Dressings    Supply: Pack Wound    Supply: Cover and Secure    Cigarette nicotine dependence with nicotine-induced disorder    Relevant Medications    Lidocaine 2 % GEL    Other Relevant Orders    Supply: Wound Cleanser    Supply: Corine Wound    Supply: Wound Dressings    Supply: Pack Wound    Supply: Cover and Secure    Ulcer of toe of left foot, with fat layer exposed (HCC)    Relevant Medications    Lidocaine 2 % GEL    Other Relevant Orders    Supply: Wound Cleanser    Supply: Corine Wound    Supply: Wound Dressings    Supply: Pack Wound    Supply: Cover and Secure           Procedure Note  Indications:  Based on my examination of this patient's wound(s)/ulcer(s) today, debridement is required to promote healing and evaluate the wound base. Performed by: Ruby Tee MD    Consent obtained:  Yes    Time out taken:  Yes    Pain Control:         Debridement:Non-excisional Debridement    Using curette the wound(s)/ulcer(s) was/were sharply debrided down through and including the removal of epidermis and dermis.         Devitalized Tissue Debrided:  fibrin, biofilm, slough, necrotic/eschar, exudate and callus      Pre Debridement Measurements:  Are located in the Wound/Ulcer Documentation Flow Sheet    Wound/Ulcer #: 2    Percent of Wound(s)/Ulcer(s) Debrided: 100%    Total Surface Area Debrided:  0.04 sq cm       Diabetic/Pressure/Non Pressure Ulcers only:  Ulcer: Diabetic ulcer, fat layer exposed             Post Debridement Measurements:    Wound/Ulcer Descriptions are Pre Debridement --EXCEPT MEASUREMENTS    Wound 07/17/20 Foot Plantar;Right Wound 2, Diabetic Lan 1, R. 1st Metatarsal Pad (Active)   Wound Image    06/07/21 0957   Wound Etiology Diabetic Lan 1 06/07/21 0957   Dressing Status Old drainage noted 06/07/21 0957   Wound Cleansed Soap and water 06/07/21 0957   Dressing/Treatment Alginate with Ag;Gauze dressing/dressing sponge;Roll gauze;Tape/Soft cloth adhesive tape 05/24/21 1045   Offloading for Diabetic Foot Ulcers Felt or foam;Forefoot offloading shoe 06/07/21 0957   Wound Length (cm) 1.5 cm 06/07/21 0957   Wound Width (cm) 1.3 cm 06/07/21 0957   Wound Depth (cm) 0.2 cm 06/07/21 0957   Wound Surface Area (cm^2) 1.95 cm^2 06/07/21 0957   Change in Wound Size % (l*w) -441.67 06/07/21 0957   Wound Volume (cm^3) 0.39 cm^3 06/07/21 0957   Wound Healing % -875 06/07/21 0957   Post-Procedure Length (cm) 0.2 cm 06/07/21 1022   Post-Procedure Width (cm) 0.2 cm 06/07/21 1022   Post-Procedure Depth (cm) 0.1 cm 06/07/21 1022   Post-Procedure Surface Area (cm^2) 0.04 cm^2 06/07/21 1022   Post-Procedure Volume (cm^3) 0 cm^3 06/07/21 1022   Distance Tunneling (cm) 0 cm 05/24/21 0939   Tunneling Position ___ O'Clock 0 05/24/21 0939   Undermining Starts ___ O'Clock 0 05/24/21 0939   Undermining Ends___ O'Clock 0 05/24/21 0939   Undermining Maxium Distance (cm) 0 05/24/21 0939   Wound Assessment Eschar dry 06/07/21 0957   Drainage Amount Small 06/07/21 0957   Drainage Description Serosanguinous 06/07/21 0957   Odor None 06/07/21 0957   Corine-wound Assessment Hyperkeratosis (callous) 06/07/21 0957   Margins Attached edges 06/07/21 0957   Wound Thickness Description not for Pressure Injury Full thickness 04/14/21 1524   Number of days: 324             Estimated Blood Loss:  Minimal    Hemostasis Achieved:  by pressure    Procedural Pain:  0  / 10     Post Procedural Pain:  0 / 10     Response to treatment:  Well tolerated by patient.          Plan:     Problem List Items Addressed This Visit     Right foot ulcer, with fat layer exposed ([address] (Chronic)    Relevant Medications    Lidocaine 2 % GEL    Other Relevant Orders    Supply: Wound Cleanser    Supply: Corine Wound    Supply: Wound Dressings    Supply: Pack Wound    Supply: Cover and Secure    * (Principal) Diabetic ulcer of right midfoot associated with type 2 diabetes mellitus, with fat layer exposed ([address] - Primary (Chronic)    Relevant Medications    Lidocaine 2 % GEL    Other Relevant Orders    Supply: Wound Cleanser    Supply: Corine Wound    Supply: Wound Dressings    Supply: Pack Wound    Supply: Cover and Secure    PVD (peripheral vascular disease) (HCC)    Relevant Medications    Lidocaine 2 % GEL    Other Relevant Orders    Supply: Wound Cleanser    Supply: Corine Wound    Supply: Wound Dressings    Supply: Pack Wound    Supply: Cover and Secure    Cigarette nicotine dependence with nicotine-induced disorder    Relevant Medications    Lidocaine 2 % GEL    Other Relevant Orders    Supply: Wound Cleanser    Supply: Corine Wound    Supply: Wound Dressings    Supply: Pack Wound    Supply: Cover and Secure    Ulcer of toe of left foot, with fat layer exposed (HCC)    Relevant Medications    Lidocaine 2 % GEL    Other Relevant Orders    Supply: Wound Cleanser    Supply: Corine Wound    Supply: Wound Dressings    Supply: Pack Wound    Supply: Cover and Secure          Cont to offload pressure wound almost healed. RTO 1 week    Treatment Note please see attached Discharge Instructions    In my professional opinion this patient would benefit from HBO Therapy: No    Written patient dismissal instructions given to patient and signed by patient or POA.          Discharge [address] and Hyperbaric Oxygen Therapy   Physician Orders and Discharge Instructions  [id number]  Telephone: [phone]    NAME:  [address] [place] [address]

## 2021-06-14 ENCOUNTER — HOSPITAL ENCOUNTER (OUTPATIENT)
Dept: WOUND CARE | Age: 56
Discharge: HOME | End: 2021-06-14
Payer: MEDICARE

## 2021-06-14 VITALS
HEART RATE: 117 BPM | SYSTOLIC BLOOD PRESSURE: 141 MMHG | BODY MASS INDEX: 29.72 KG/M2 | RESPIRATION RATE: 18 BRPM | WEIGHT: 239 LBS | TEMPERATURE: 98.1 F | HEIGHT: 75 IN | DIASTOLIC BLOOD PRESSURE: 78 MMHG

## 2021-06-14 DIAGNOSIS — L97.522 ULCER OF TOE OF LEFT FOOT, WITH FAT LAYER EXPOSED (HCC): ICD-10-CM

## 2021-06-14 DIAGNOSIS — E11.621 DIABETIC ULCER OF RIGHT MIDFOOT ASSOCIATED WITH TYPE 2 DIABETES MELLITUS, WITH FAT LAYER EXPOSED (HCC): Primary | ICD-10-CM

## 2021-06-14 DIAGNOSIS — I73.9 PVD (PERIPHERAL VASCULAR DISEASE) (HCC): ICD-10-CM

## 2021-06-14 DIAGNOSIS — L97.412 DIABETIC ULCER OF RIGHT MIDFOOT ASSOCIATED WITH TYPE 2 DIABETES MELLITUS, WITH FAT LAYER EXPOSED (HCC): Primary | ICD-10-CM

## 2021-06-14 DIAGNOSIS — F17.219 CIGARETTE NICOTINE DEPENDENCE WITH NICOTINE-INDUCED DISORDER: ICD-10-CM

## 2021-06-14 DIAGNOSIS — L97.512 RIGHT FOOT ULCER, WITH FAT LAYER EXPOSED (HCC): ICD-10-CM

## 2021-06-14 PROCEDURE — 97597 DBRDMT OPN WND 1ST 20 CM/<: CPT

## 2021-06-14 PROCEDURE — 97597 DBRDMT OPN WND 1ST 20 CM/<: CPT | Performed by: SURGERY

## 2021-06-14 NOTE — PROGRESS NOTES
[address]   Progress Note and Procedure Note      Svitlana Owen  MEDICAL RECORD NUMBER:  [id number]  AGE: 49 y.o. GENDER: male  : 1965  EPISODE DATE:  2021    Subjective:     Chief Complaint   Patient presents with    Wound Check     wound         HISTORY of PRESENT ILLNESS HPI     Svitlana Owen is a 49 y.o. male who presents today for wound/ulcer evaluation. Wound Context: Pt with R foot wound here for eval/treat  Wound/Ulcer Pain Timing/Severity: none  Quality of pain: N/A  Severity:  0 / 10   Modifying Factors: None  Associated Signs/Symptoms: none    Ulcer Identification:  Ulcer Type: diabetic and pressure  Contributing Factors: diabetes, chronic pressure and shear force    Wound: DM shear        PAST MEDICAL HISTORY        Diagnosis Date    Anxiety     [facility] ([facility]     Osteomyelitis of ankle or foot, acute, left ([facility] [facility] 2020    Schizophrenic disorder ([facility] [facility]        PAST SURGICAL HISTORY    Past Surgical History:   Procedure Laterality Date    TOE AMPUTATION Left 2020    4TH AND 5TH OPEN TOE AMPUTATION performed by Demarco Hanley MD at [facility] [address]    History reviewed. No pertinent family history.     SOCIAL HISTORY    Social History     Tobacco Use    Smoking status: Former Smoker     Packs/day: 0.00     Types: Cigars    Smokeless tobacco: Never Used    Tobacco comment: Had to quit can not smoke, can not go outside   [employer] Vaping Use: Never used   Substance Use Topics    Alcohol use: Not Currently    Drug use: Never       ALLERGIES    Allergies   Allergen Reactions    Clozaril [Clozapine]     Haldol [Haloperidol]     Lithium     Navane [Thiothixene]        MEDICATIONS    Current Outpatient Medications on File Prior to Encounter   Medication Sig Dispense Refill    docusate sodium (COLACE) 100 MG capsule Take 100 mg by mouth daily give 1 capsule by mouth one time a day for constipatiom      metFORMIN (GLUCOPHAGE) 1000 MG tablet Take 1,000 mg by mouth 2 times daily (with meals) Give 1 tablet by mouth two times a day for diabetes      Multiple Vitamins-Minerals (THERAPEUTIC MULTIVITAMIN-MINERALS) tablet Take 1 tablet by mouth daily Give 1 tablet by mouth one time a day for wound healing      glipiZIDE (GLUCOTROL XL) 5 MG extended release tablet Take 5 mg by mouth daily      CHOLECALCIFEROL PO Take 1,000 Units by mouth daily      polyethylene glycol (MIRALAX) 17 g PACK packet Take 17 g by mouth daily as needed      acetaminophen (TYLENOL) 325 MG tablet Take 650 mg by mouth every 4 hours as needed for Pain      famotidine (PEPCID) 20 MG tablet Take 20 mg by mouth daily      risperiDONE (RISPERDAL) 2 MG tablet Take 2 mg by mouth 2 times daily Give 1 tablet by mouth two times a day related to schizophrenia      FLUoxetine (PROZAC) 40 MG capsule Take 40 mg by mouth nightly      OLANZapine zydis (ZYPREXA) 20 MG disintegrating tablet Take 20 mg by mouth nightly      insulin glargine (BASAGLAR KWIKPEN) 100 UNIT/ML injection pen Inject 100 Units into the skin nightly       risperiDONE (RISPERDAL) 4 MG tablet Take 4 mg by mouth nightly      simvastatin (ZOCOR) 20 MG tablet Take 20 mg by mouth nightly      ondansetron (ZOFRAN) 4 MG tablet Take 4 mg by mouth every 6-8 hours as needed for Nausea or Vomiting Give 1 tablet by mouth every 6 hours as needed for nausea and vomiting      acetaminophen (TYLENOL) 650 MG suppository Place 650 mg rectally every 6 hours as needed for Fever or Pain      ibuprofen (ADVIL;MOTRIN) 400 MG tablet Take 400 mg by mouth every 6 hours as needed for Pain or Fever      loperamide (IMODIUM) 2 MG capsule Take 2 mg by mouth 4 times daily as needed for Diarrhea      hydrOXYzine (ATARAX) 25 MG tablet Take 25 mg by mouth every 6 hours as needed for Itching        No current facility-administered medications on file prior to encounter.        REVIEW OF SYSTEMS    A comprehensive review of systems was negative.     Objective:      BP (!) 141/78   Pulse 117   Temp 98.1 °F (36.7 °C) (Temporal)   Resp 18   Ht 6' 3\" (1.905 m)   Wt 239 lb (108.4 kg)   BMI 29.87 kg/m²     Wt Readings from Last 3 Encounters:   06/14/21 239 lb (108.4 kg)   06/07/21 239 lb (108.4 kg)   05/24/21 (S) 239 lb 3 oz (108.5 kg)       PHYSICAL EXAM    General Appearance: alert and oriented to person, place and time, well developed and well- nourished, in no acute distress  Skin: warm and dry, no rash or erythema  Head: normocephalic and atraumatic  Eyes: pupils equal, round, and reactive to light, extraocular eye movements intact, conjunctivae normal  ENT: tympanic membrane, external ear and ear canal normal bilaterally, nose without deformity, nasal mucosa and turbinates normal without polyps, lips teeth and gums normal  Neck: supple and non-tender without mass, no thyromegaly or thyroid nodules, no cervical lymphadenopathy  Pulmonary/Chest: clear to auscultation bilaterally- no wheezes, rales or rhonchi, normal air movement, no respiratory distress  Cardiovascular: normal rate, regular rhythm, normal S1 and S2, no murmurs, rubs, clicks, or gallops, distal pulses intact, no carotid bruits  Abdomen: soft, non-tender, non-distended, normal bowel sounds, no masses or organomegaly  Extremities: no cyanosis, clubbing or edema  Musculoskeletal: normal range of motion, no joint swelling, deformity or tenderness  Neurologic: reflexes normal and symmetric, no cranial nerve deficit, gait, coordination and speech normal, sensation of skin normal      Assessment:      Problem List Items Addressed This Visit     * (Principal) Right foot ulcer, with fat layer exposed (HCC) (Chronic)    Relevant Orders    Supply: Wound Cleanser    Diabetic ulcer of right midfoot associated with type 2 diabetes mellitus, with fat layer exposed ([facility] - Primary (Chronic)    Relevant Orders    Supply: Wound Cleanser    PVD (peripheral vascular disease) ([facility] [address]    Relevant Orders    Supply: Wound Cleanser    Cigarette nicotine dependence with nicotine-induced disorder    Relevant Orders    Supply: Wound Cleanser    Ulcer of toe of left foot, with fat layer exposed ([address]    Relevant Orders    Supply: Wound Cleanser           Procedure Note  Indications:  Based on my examination of this patient's wound(s)/ulcer(s) today, debridement is required to promote healing and evaluate the wound base. Performed by: Yosvany Hickman MD    Consent obtained:  Yes    Time out taken:  Yes    Pain Control: Anesthetic  Anesthetic: 2% Lidocaine Gel Topical       Debridement:Non-excisional Debridement    Using curette the wound(s)/ulcer(s) was/were sharply debrided down through and including the removal of epidermis and dermis. Devitalized Tissue Debrided:  fibrin, biofilm, slough, necrotic/eschar, exudate and callus      Pre Debridement Measurements:  Are located in the Wound/Ulcer Documentation Flow Sheet    Wound/Ulcer #: 2    Percent of Wound(s)/Ulcer(s) Debrided: 100%    Total Surface Area Debrided:  0.02 sq cm       Diabetic/Pressure/Non Pressure Ulcers only:  Ulcer: Diabetic ulcer, fat layer exposed             Post Debridement Measurements:    Wound/Ulcer Descriptions are Pre Debridement --EXCEPT MEASUREMENTS    Wound 07/17/20 Foot Plantar;Right Wound 2, Diabetic Lan 1, R. 1st Metatarsal Pad (Active)   Wound Image   06/14/21 0941   Wound Etiology Diabetic Lan 1 06/14/21 0941   Dressing Status Dry; Intact 06/14/21 0941   Wound Cleansed Soap and water 06/14/21 0941   Dressing/Treatment Alginate with Ag;Gauze dressing/dressing sponge;Roll gauze;Tape/Soft cloth adhesive tape 06/07/21 1100   Offloading for Diabetic Foot Ulcers Felt or foam;Forefoot offloading shoe 06/14/21 0941   Wound Length (cm) 0.2 cm 06/14/21 0941   Wound Width (cm) 0.1 cm 06/14/21 0941   Wound Depth (cm) 0.1 cm 06/14/21 0941   Wound Surface Area (cm^2) 0.02 cm^2 06/14/21 0941   Change in Wound Size % (l*w) 94.44 06/14/21 0941   Wound Volume (cm^3) 0 cm^3 06/14/21 0941   Wound Healing % 100 06/14/21 0941   Post-Procedure Length (cm) 0.2 cm 06/14/21 1009   Post-Procedure Width (cm) 0.1 cm 06/14/21 1009   Post-Procedure Depth (cm) 0.1 cm 06/14/21 1009   Post-Procedure Surface Area (cm^2) 0.02 cm^2 06/14/21 1009   Post-Procedure Volume (cm^3) 0 cm^3 06/14/21 1009   Distance Tunneling (cm) 0 cm 06/14/21 0941   Tunneling Position ___ O'Clock 0 06/14/21 0941   Undermining Starts ___ O'Clock 0 06/14/21 0941   Undermining Ends___ O'Clock 0 06/14/21 0941   Undermining Maxium Distance (cm) 0 06/14/21 0941   Wound Assessment Eschar dry 06/14/21 0941   Drainage Amount None 06/14/21 0941   Drainage Description Serosanguinous 06/07/21 0957   Odor None 06/14/21 0941   Corine-wound Assessment Hyperkeratosis (callous) 06/14/21 0941   Margins Attached edges 06/14/21 0941   Wound Thickness Description not for Pressure Injury Full thickness 04/14/21 1524   Number of days: 331             Estimated Blood Loss:  Minimal    Hemostasis Achieved:  by pressure    Procedural Pain:  0  / 10     Post Procedural Pain:  0 / 10     Response to treatment:  Well tolerated by patient. Plan:     Problem List Items Addressed This Visit     * (Principal) Right foot ulcer, with fat layer exposed ([address] (Chronic)    Relevant Orders    Supply: Wound Cleanser    Diabetic ulcer of right midfoot associated with type 2 diabetes mellitus, with fat layer exposed ([address] - Primary (Chronic)    Relevant Orders    Supply: Wound Cleanser    PVD (peripheral vascular disease) ([address]    Relevant Orders    Supply: Wound Cleanser    Cigarette nicotine dependence with nicotine-induced disorder    Relevant Orders    Supply: Wound Cleanser    Ulcer of toe of left foot, with fat layer exposed ([address]    Relevant Orders    Supply: Wound Cleanser          Pt wound looking great almost healed!   Cont current care    Treatment Note please see attached Discharge Instructions    In my

## 2021-06-15 RX ORDER — LIDOCAINE HYDROCHLORIDE 20 MG/ML
JELLY TOPICAL PRN
Status: CANCELLED
Start: 2021-06-15

## 2021-06-21 ENCOUNTER — HOSPITAL ENCOUNTER (OUTPATIENT)
Dept: WOUND CARE | Age: 56
Discharge: HOME | End: 2021-06-21
Payer: MEDICARE

## 2021-06-21 VITALS
RESPIRATION RATE: 18 BRPM | DIASTOLIC BLOOD PRESSURE: 75 MMHG | BODY MASS INDEX: 29.72 KG/M2 | WEIGHT: 239 LBS | HEART RATE: 86 BPM | HEIGHT: 75 IN | SYSTOLIC BLOOD PRESSURE: 120 MMHG | TEMPERATURE: 98 F

## 2021-06-21 DIAGNOSIS — L97.522 ULCER OF TOE OF LEFT FOOT, WITH FAT LAYER EXPOSED (HCC): ICD-10-CM

## 2021-06-21 DIAGNOSIS — L97.512 RIGHT FOOT ULCER, WITH FAT LAYER EXPOSED (HCC): ICD-10-CM

## 2021-06-21 DIAGNOSIS — F17.219 CIGARETTE NICOTINE DEPENDENCE WITH NICOTINE-INDUCED DISORDER: ICD-10-CM

## 2021-06-21 DIAGNOSIS — I73.9 PVD (PERIPHERAL VASCULAR DISEASE) (HCC): ICD-10-CM

## 2021-06-21 DIAGNOSIS — E11.621 DIABETIC ULCER OF RIGHT MIDFOOT ASSOCIATED WITH TYPE 2 DIABETES MELLITUS, WITH FAT LAYER EXPOSED (HCC): ICD-10-CM

## 2021-06-21 DIAGNOSIS — L97.412 DIABETIC ULCER OF RIGHT MIDFOOT ASSOCIATED WITH TYPE 2 DIABETES MELLITUS, WITH FAT LAYER EXPOSED (HCC): ICD-10-CM

## 2021-06-21 PROCEDURE — 99212 OFFICE O/P EST SF 10 MIN: CPT | Performed by: SURGERY

## 2021-06-21 PROCEDURE — 99212 OFFICE O/P EST SF 10 MIN: CPT

## 2021-06-21 ASSESSMENT — PAIN DESCRIPTION - PROGRESSION: CLINICAL_PROGRESSION: NOT CHANGED

## 2021-06-21 ASSESSMENT — PAIN DESCRIPTION - ONSET: ONSET: ON-GOING

## 2021-06-21 ASSESSMENT — PAIN DESCRIPTION - PAIN TYPE: TYPE: ACUTE PAIN

## 2021-06-21 ASSESSMENT — PAIN SCALES - GENERAL: PAINLEVEL_OUTOF10: 0

## 2021-06-21 NOTE — PROGRESS NOTES
[address]   Progress Note and Procedure Note      Lashonda Norman  MEDICAL RECORD NUMBER:  [id number]  AGE: 49 y.o. GENDER: male  : 1965  EPISODE DATE:  2021    Subjective:     Chief Complaint   Patient presents with    Wound Check     follow up         HISTORY of PRESENT ILLNESS HPI     Lashonda Norman is a 49 y.o. male who presents today for wound/ulcer evaluation. History of Wound Context: Pt with R foot ulcer here for eval/treat  Wound/Ulcer Pain Timing/Severity: none  Quality of pain: N/A  Severity:  0 / 10   Modifying Factors: None  Associated Signs/Symptoms: none    Ulcer Identification:  Ulcer Type: diabetic  Contributing Factors: diabetes and chronic pressure    Wound: DM        PAST MEDICAL HISTORY        Diagnosis Date    Anxiety     Diabetes ([facility]     Osteomyelitis of ankle or foot, acute, left ([facility] [facility] 2020    [facility]        PAST SURGICAL HISTORY    Past Surgical History:   Procedure Laterality Date    TOE AMPUTATION Left 2020    4TH AND 5TH OPEN TOE AMPUTATION performed by Thomas Paez MD at [address]    History reviewed. No pertinent family history.     SOCIAL HISTORY    Social History     Tobacco Use    Smoking status: Former Smoker     Packs/day: 0.00     Types: Cigars    Smokeless tobacco: Never Used    Tobacco comment: Had to quit can not smoke, can not go outside   [employer] Vaping Use: Never used   Substance Use Topics    Alcohol use: Not Currently    Drug use: Never       ALLERGIES    Allergies   Allergen Reactions    Clozaril [Clozapine]     Haldol [Haloperidol]     Lithium     Navane [Thiothixene]        MEDICATIONS    Current Outpatient Medications on File Prior to Encounter   Medication Sig Dispense Refill    docusate sodium (COLACE) 100 MG capsule Take 100 mg by mouth daily give 1 capsule by mouth one time a day for constipatiom      metFORMIN (GLUCOPHAGE) 1000 MG tablet Take 1,000 mg by mouth 2 times daily (with meals) Give 1 tablet by mouth two times a day for diabetes      Multiple Vitamins-Minerals (THERAPEUTIC MULTIVITAMIN-MINERALS) tablet Take 1 tablet by mouth daily Give 1 tablet by mouth one time a day for wound healing      ondansetron (ZOFRAN) 4 MG tablet Take 4 mg by mouth every 6-8 hours as needed for Nausea or Vomiting Give 1 tablet by mouth every 6 hours as needed for nausea and vomiting      glipiZIDE (GLUCOTROL XL) 5 MG extended release tablet Take 5 mg by mouth daily      CHOLECALCIFEROL PO Take 1,000 Units by mouth daily      polyethylene glycol (MIRALAX) 17 g PACK packet Take 17 g by mouth daily as needed      acetaminophen (TYLENOL) 325 MG tablet Take 650 mg by mouth every 4 hours as needed for Pain      famotidine (PEPCID) 20 MG tablet Take 20 mg by mouth daily      risperiDONE (RISPERDAL) 2 MG tablet Take 2 mg by mouth 2 times daily Give 1 tablet by mouth two times a day related to schizophrenia      ibuprofen (ADVIL;MOTRIN) 400 MG tablet Take 400 mg by mouth every 6 hours as needed for Pain or Fever      loperamide (IMODIUM) 2 MG capsule Take 2 mg by mouth 4 times daily as needed for Diarrhea      FLUoxetine (PROZAC) 40 MG capsule Take 40 mg by mouth nightly      OLANZapine zydis (ZYPREXA) 20 MG disintegrating tablet Take 20 mg by mouth nightly      insulin glargine (BASAGLAR KWIKPEN) 100 UNIT/ML injection pen Inject 100 Units into the skin nightly       hydrOXYzine (ATARAX) 25 MG tablet Take 25 mg by mouth every 6 hours as needed for Itching       risperiDONE (RISPERDAL) 4 MG tablet Take 4 mg by mouth nightly      simvastatin (ZOCOR) 20 MG tablet Take 20 mg by mouth nightly      acetaminophen (TYLENOL) 650 MG suppository Place 650 mg rectally every 6 hours as needed for Fever or Pain       No current facility-administered medications on file prior to encounter. REVIEW OF SYSTEMS    A comprehensive review of systems was negative.     Objective:      BP with fat layer exposed ([facility] L97.522    Diabetic ulcer of right midfoot associated with type 2 diabetes mellitus, with fat layer exposed ([facility] E11.621, L97.412             Wound 07/17/20 Foot Plantar;Right Wound 2, Diabetic Lan 1, R. 1st Metatarsal Pad (Active)   Wound Image   06/14/21 0941   Wound Etiology Diabetic Lan 1 06/21/21 1000   Dressing Status Dry 06/21/21 1000   Wound Cleansed Soap and water 06/14/21 0941   Dressing/Treatment Alginate with Ag;Gauze dressing/dressing sponge;Roll gauze;Tape/Soft cloth adhesive tape 06/14/21 1032   Offloading for Diabetic Foot Ulcers Felt or foam;Forefoot offloading shoe 06/14/21 1032   Wound Length (cm) 0 cm 06/21/21 1000   Wound Width (cm) 0 cm 06/21/21 1000   Wound Depth (cm) 0 cm 06/21/21 1000   Wound Surface Area (cm^2) 0 cm^2 06/21/21 1000   Change in Wound Size % (l*w) 100 06/21/21 1000   Wound Volume (cm^3) 0 cm^3 06/21/21 1000   Wound Healing % 100 06/21/21 1000   Post-Procedure Length (cm) 0.2 cm 06/14/21 1009   Post-Procedure Width (cm) 0.1 cm 06/14/21 1009   Post-Procedure Depth (cm) 0.1 cm 06/14/21 1009   Post-Procedure Surface Area (cm^2) 0.02 cm^2 06/14/21 1009   Post-Procedure Volume (cm^3) 0 cm^3 06/14/21 1009   Distance Tunneling (cm) 0 cm 06/21/21 1000   Tunneling Position ___ O'Clock 0 06/21/21 1000   Undermining Starts ___ O'Clock 0 06/21/21 1000   Undermining Ends___ O'Clock 0 06/21/21 1000   Undermining Maxium Distance (cm) 0 06/21/21 1000   Wound Assessment Dry 06/21/21 1000   Drainage Amount None 06/21/21 1000   Drainage Description Serosanguinous 06/07/21 0957   Odor None 06/21/21 1000   Corine-wound Assessment Hyperkeratosis (callous) 06/21/21 1000   Margins Attached edges 06/14/21 0941   Wound Thickness Description not for Pressure Injury Full thickness 04/14/21 1524   Number of days: 338             Plan:     Problem List Items Addressed This Visit     Right foot ulcer, with fat layer exposed ([facility] (Chronic)    * (Principal) Diabetic ulcer of right midfoot associated with type 2 diabetes mellitus, with fat layer exposed ([address] (Chronic)    PVD (peripheral vascular disease) (HCC)    Cigarette nicotine dependence with nicotine-induced disorder    Ulcer of toe of left foot, with fat layer exposed ([address]        Wound healed RTO PRN      Treatment Note please see attached Discharge Instructions    In my professional opinion this patient would benefit from HBO Therapy: No    Written patient dismissal instructions given to patient and signed by patient or POA.              Electronically signed by Marcelino Hansen MD on 6/21/2021 at 10:30 AM
